# Patient Record
Sex: FEMALE | Race: WHITE | NOT HISPANIC OR LATINO | Employment: UNEMPLOYED | URBAN - METROPOLITAN AREA
[De-identification: names, ages, dates, MRNs, and addresses within clinical notes are randomized per-mention and may not be internally consistent; named-entity substitution may affect disease eponyms.]

---

## 2017-02-06 ENCOUNTER — TRANSCRIBE ORDERS (OUTPATIENT)
Dept: ADMINISTRATIVE | Facility: HOSPITAL | Age: 42
End: 2017-02-06

## 2017-02-06 DIAGNOSIS — Z12.31 VISIT FOR SCREENING MAMMOGRAM: Primary | ICD-10-CM

## 2019-03-28 ENCOUNTER — TRANSCRIBE ORDERS (OUTPATIENT)
Dept: ADMINISTRATIVE | Facility: HOSPITAL | Age: 44
End: 2019-03-28

## 2019-03-28 DIAGNOSIS — Z12.31 VISIT FOR SCREENING MAMMOGRAM: Primary | ICD-10-CM

## 2019-04-02 ENCOUNTER — APPOINTMENT (OUTPATIENT)
Dept: MAMMOGRAPHY | Facility: HOSPITAL | Age: 44
End: 2019-04-02

## 2019-05-10 ENCOUNTER — APPOINTMENT (OUTPATIENT)
Dept: MAMMOGRAPHY | Facility: HOSPITAL | Age: 44
End: 2019-05-10

## 2019-05-15 ENCOUNTER — APPOINTMENT (OUTPATIENT)
Dept: MAMMOGRAPHY | Facility: HOSPITAL | Age: 44
End: 2019-05-15

## 2019-06-14 ENCOUNTER — APPOINTMENT (OUTPATIENT)
Dept: MAMMOGRAPHY | Facility: HOSPITAL | Age: 44
End: 2019-06-14

## 2019-10-28 ENCOUNTER — TRANSCRIBE ORDERS (OUTPATIENT)
Dept: ADMINISTRATIVE | Facility: HOSPITAL | Age: 44
End: 2019-10-28

## 2019-10-28 DIAGNOSIS — Z12.31 VISIT FOR SCREENING MAMMOGRAM: Primary | ICD-10-CM

## 2020-01-06 ENCOUNTER — APPOINTMENT (OUTPATIENT)
Dept: MAMMOGRAPHY | Facility: HOSPITAL | Age: 45
End: 2020-01-06

## 2020-08-31 ENCOUNTER — TRANSCRIBE ORDERS (OUTPATIENT)
Dept: ADMINISTRATIVE | Facility: HOSPITAL | Age: 45
End: 2020-08-31

## 2020-08-31 DIAGNOSIS — Z12.31 VISIT FOR SCREENING MAMMOGRAM: Primary | ICD-10-CM

## 2020-11-25 ENCOUNTER — APPOINTMENT (OUTPATIENT)
Dept: MAMMOGRAPHY | Facility: HOSPITAL | Age: 45
End: 2020-11-25

## 2021-02-21 ENCOUNTER — HOSPITAL ENCOUNTER (INPATIENT)
Facility: HOSPITAL | Age: 46
LOS: 18 days | Discharge: PSYCHIATRIC OR SUBSTANCE USE FACILITY | End: 2021-03-11
Attending: EMERGENCY MEDICINE | Admitting: INTERNAL MEDICINE

## 2021-02-21 ENCOUNTER — APPOINTMENT (OUTPATIENT)
Dept: CT IMAGING | Facility: HOSPITAL | Age: 46
End: 2021-02-21

## 2021-02-21 DIAGNOSIS — T14.91XA SUICIDE ATTEMPT (HCC): Primary | ICD-10-CM

## 2021-02-21 DIAGNOSIS — T54.92XA: ICD-10-CM

## 2021-02-21 DIAGNOSIS — F10.920 ALCOHOLIC INTOXICATION WITHOUT COMPLICATION (HCC): ICD-10-CM

## 2021-02-21 DIAGNOSIS — K20.90 ESOPHAGITIS: ICD-10-CM

## 2021-02-21 DIAGNOSIS — F10.931 ALCOHOL WITHDRAWAL SYNDROME, WITH DELIRIUM (HCC): ICD-10-CM

## 2021-02-21 PROBLEM — E87.20 LACTIC ACIDOSIS: Status: ACTIVE | Noted: 2021-02-21

## 2021-02-21 PROBLEM — Z53.1 REFUSAL OF BLOOD TRANSFUSIONS AS PATIENT IS JEHOVAH'S WITNESS: Status: ACTIVE | Noted: 2021-02-21

## 2021-02-21 PROBLEM — R78.0 ELEVATED ETOH LEVEL: Status: ACTIVE | Noted: 2021-02-21

## 2021-02-21 LAB
ALBUMIN SERPL-MCNC: 4.2 G/DL (ref 3.5–5.2)
ALBUMIN/GLOB SERPL: 1.4 G/DL
ALP SERPL-CCNC: 142 U/L (ref 39–117)
ALT SERPL W P-5'-P-CCNC: 42 U/L (ref 1–33)
AMPHET+METHAMPHET UR QL: NEGATIVE
AMPHETAMINES UR QL: NEGATIVE
ANION GAP SERPL CALCULATED.3IONS-SCNC: 15 MMOL/L (ref 5–15)
APAP SERPL-MCNC: <5 MCG/ML (ref 0–30)
AST SERPL-CCNC: 53 U/L (ref 1–32)
BARBITURATES UR QL SCN: NEGATIVE
BASOPHILS # BLD AUTO: 0.05 10*3/MM3 (ref 0–0.2)
BASOPHILS NFR BLD AUTO: 0.3 % (ref 0–1.5)
BENZODIAZ UR QL SCN: NEGATIVE
BILIRUB SERPL-MCNC: 0.3 MG/DL (ref 0–1.2)
BILIRUB UR QL STRIP: NEGATIVE
BUN SERPL-MCNC: 6 MG/DL (ref 6–20)
BUN/CREAT SERPL: 8 (ref 7–25)
BUPRENORPHINE SERPL-MCNC: NEGATIVE NG/ML
CALCIUM SPEC-SCNC: 9.3 MG/DL (ref 8.6–10.5)
CANNABINOIDS SERPL QL: NEGATIVE
CHLORIDE SERPL-SCNC: 96 MMOL/L (ref 98–107)
CLARITY UR: CLEAR
CO2 SERPL-SCNC: 25 MMOL/L (ref 22–29)
COCAINE UR QL: NEGATIVE
COLOR UR: YELLOW
CREAT SERPL-MCNC: 0.75 MG/DL (ref 0.57–1)
D-LACTATE SERPL-SCNC: 6.7 MMOL/L (ref 0.5–2)
DEPRECATED RDW RBC AUTO: 60.9 FL (ref 37–54)
EOSINOPHIL # BLD AUTO: 0.03 10*3/MM3 (ref 0–0.4)
EOSINOPHIL NFR BLD AUTO: 0.2 % (ref 0.3–6.2)
ERYTHROCYTE [DISTWIDTH] IN BLOOD BY AUTOMATED COUNT: 22.4 % (ref 12.3–15.4)
ETHANOL BLD-MCNC: 161 MG/DL (ref 0–10)
FLUAV RNA RESP QL NAA+PROBE: NOT DETECTED
FLUBV RNA RESP QL NAA+PROBE: NOT DETECTED
GFR SERPL CREATININE-BSD FRML MDRD: 83 ML/MIN/1.73
GLOBULIN UR ELPH-MCNC: 2.9 GM/DL
GLUCOSE SERPL-MCNC: 150 MG/DL (ref 65–99)
GLUCOSE UR STRIP-MCNC: NEGATIVE MG/DL
HCT VFR BLD AUTO: 36.5 % (ref 34–46.6)
HGB BLD-MCNC: 11.1 G/DL (ref 12–15.9)
HGB UR QL STRIP.AUTO: NEGATIVE
HOLD SPECIMEN: NORMAL
IMM GRANULOCYTES # BLD AUTO: 0.06 10*3/MM3 (ref 0–0.05)
IMM GRANULOCYTES NFR BLD AUTO: 0.4 % (ref 0–0.5)
KETONES UR QL STRIP: NEGATIVE
LACTATE HOLD SPECIMEN: NORMAL
LEUKOCYTE ESTERASE UR QL STRIP.AUTO: NEGATIVE
LIPASE SERPL-CCNC: 13 U/L (ref 13–60)
LYMPHOCYTES # BLD AUTO: 2.55 10*3/MM3 (ref 0.7–3.1)
LYMPHOCYTES NFR BLD AUTO: 17.4 % (ref 19.6–45.3)
MAGNESIUM SERPL-MCNC: 2.2 MG/DL (ref 1.6–2.6)
MCH RBC QN AUTO: 24.4 PG (ref 26.6–33)
MCHC RBC AUTO-ENTMCNC: 30.4 G/DL (ref 31.5–35.7)
MCV RBC AUTO: 80.2 FL (ref 79–97)
METHADONE UR QL SCN: NEGATIVE
MONOCYTES # BLD AUTO: 0.66 10*3/MM3 (ref 0.1–0.9)
MONOCYTES NFR BLD AUTO: 4.5 % (ref 5–12)
NEUTROPHILS NFR BLD AUTO: 11.28 10*3/MM3 (ref 1.7–7)
NEUTROPHILS NFR BLD AUTO: 77.2 % (ref 42.7–76)
NITRITE UR QL STRIP: NEGATIVE
NRBC BLD AUTO-RTO: 0 /100 WBC (ref 0–0.2)
OPIATES UR QL: NEGATIVE
OXYCODONE UR QL SCN: NEGATIVE
PCP UR QL SCN: NEGATIVE
PH UR STRIP.AUTO: 6 [PH] (ref 5–8)
PHOSPHATE SERPL-MCNC: 3 MG/DL (ref 2.5–4.5)
PLATELET # BLD AUTO: 352 10*3/MM3 (ref 140–450)
PMV BLD AUTO: 9.3 FL (ref 6–12)
POTASSIUM SERPL-SCNC: 3.6 MMOL/L (ref 3.5–5.2)
PROPOXYPH UR QL: NEGATIVE
PROT SERPL-MCNC: 7.1 G/DL (ref 6–8.5)
PROT UR QL STRIP: NEGATIVE
RBC # BLD AUTO: 4.55 10*6/MM3 (ref 3.77–5.28)
SALICYLATES SERPL-MCNC: <0.3 MG/DL
SARS-COV-2 RNA RESP QL NAA+PROBE: NOT DETECTED
SODIUM SERPL-SCNC: 136 MMOL/L (ref 136–145)
SP GR UR STRIP: 1.01 (ref 1–1.03)
TRICYCLICS UR QL SCN: POSITIVE
UROBILINOGEN UR QL STRIP: NORMAL
WBC # BLD AUTO: 14.63 10*3/MM3 (ref 3.4–10.8)
WHOLE BLOOD HOLD SPECIMEN: NORMAL
WHOLE BLOOD HOLD SPECIMEN: NORMAL

## 2021-02-21 PROCEDURE — 80179 DRUG ASSAY SALICYLATE: CPT | Performed by: EMERGENCY MEDICINE

## 2021-02-21 PROCEDURE — 25010000002 MORPHINE PER 10 MG: Performed by: EMERGENCY MEDICINE

## 2021-02-21 PROCEDURE — 80053 COMPREHEN METABOLIC PANEL: CPT | Performed by: EMERGENCY MEDICINE

## 2021-02-21 PROCEDURE — 85025 COMPLETE CBC W/AUTO DIFF WBC: CPT | Performed by: EMERGENCY MEDICINE

## 2021-02-21 PROCEDURE — 83735 ASSAY OF MAGNESIUM: CPT | Performed by: EMERGENCY MEDICINE

## 2021-02-21 PROCEDURE — 80143 DRUG ASSAY ACETAMINOPHEN: CPT | Performed by: EMERGENCY MEDICINE

## 2021-02-21 PROCEDURE — 80306 DRUG TEST PRSMV INSTRMNT: CPT | Performed by: EMERGENCY MEDICINE

## 2021-02-21 PROCEDURE — 84100 ASSAY OF PHOSPHORUS: CPT | Performed by: EMERGENCY MEDICINE

## 2021-02-21 PROCEDURE — 83605 ASSAY OF LACTIC ACID: CPT | Performed by: EMERGENCY MEDICINE

## 2021-02-21 PROCEDURE — 83690 ASSAY OF LIPASE: CPT | Performed by: EMERGENCY MEDICINE

## 2021-02-21 PROCEDURE — 81003 URINALYSIS AUTO W/O SCOPE: CPT | Performed by: EMERGENCY MEDICINE

## 2021-02-21 PROCEDURE — 25010000002 ONDANSETRON PER 1 MG: Performed by: EMERGENCY MEDICINE

## 2021-02-21 PROCEDURE — 87636 SARSCOV2 & INF A&B AMP PRB: CPT | Performed by: NURSE PRACTITIONER

## 2021-02-21 PROCEDURE — 71250 CT THORAX DX C-: CPT

## 2021-02-21 PROCEDURE — 25010000002 PROCHLORPERAZINE 10 MG/2ML SOLUTION: Performed by: NURSE PRACTITIONER

## 2021-02-21 PROCEDURE — 25010000002 HYDROMORPHONE PER 4 MG: Performed by: NURSE PRACTITIONER

## 2021-02-21 PROCEDURE — 99291 CRITICAL CARE FIRST HOUR: CPT | Performed by: INTERNAL MEDICINE

## 2021-02-21 PROCEDURE — 82077 ASSAY SPEC XCP UR&BREATH IA: CPT | Performed by: EMERGENCY MEDICINE

## 2021-02-21 RX ORDER — SODIUM CHLORIDE, SODIUM LACTATE, POTASSIUM CHLORIDE, CALCIUM CHLORIDE 600; 310; 30; 20 MG/100ML; MG/100ML; MG/100ML; MG/100ML
125 INJECTION, SOLUTION INTRAVENOUS CONTINUOUS
Status: DISCONTINUED | OUTPATIENT
Start: 2021-02-21 | End: 2021-02-23

## 2021-02-21 RX ORDER — PAROXETINE HYDROCHLORIDE 20 MG/1
60 TABLET, FILM COATED ORAL EVERY MORNING
Status: ON HOLD | COMMUNITY
End: 2022-05-14

## 2021-02-21 RX ORDER — ONDANSETRON 2 MG/ML
4 INJECTION INTRAMUSCULAR; INTRAVENOUS ONCE
Status: COMPLETED | OUTPATIENT
Start: 2021-02-21 | End: 2021-02-21

## 2021-02-21 RX ORDER — QUETIAPINE FUMARATE 25 MG/1
200 TABLET, FILM COATED ORAL NIGHTLY
Status: ON HOLD | COMMUNITY
End: 2021-02-22

## 2021-02-21 RX ORDER — PROCHLORPERAZINE EDISYLATE 5 MG/ML
5 INJECTION INTRAMUSCULAR; INTRAVENOUS EVERY 6 HOURS PRN
Status: DISCONTINUED | OUTPATIENT
Start: 2021-02-21 | End: 2021-02-22

## 2021-02-21 RX ORDER — PANTOPRAZOLE SODIUM 40 MG/10ML
40 INJECTION, POWDER, LYOPHILIZED, FOR SOLUTION INTRAVENOUS
Status: DISCONTINUED | OUTPATIENT
Start: 2021-02-22 | End: 2021-02-22

## 2021-02-21 RX ORDER — SODIUM CHLORIDE 9 MG/ML
10 INJECTION INTRAVENOUS AS NEEDED
Status: DISCONTINUED | OUTPATIENT
Start: 2021-02-21 | End: 2021-02-22

## 2021-02-21 RX ORDER — HYDROMORPHONE HYDROCHLORIDE 1 MG/ML
0.5 INJECTION, SOLUTION INTRAMUSCULAR; INTRAVENOUS; SUBCUTANEOUS
Status: DISCONTINUED | OUTPATIENT
Start: 2021-02-21 | End: 2021-02-21

## 2021-02-21 RX ORDER — GABAPENTIN 600 MG/1
600 TABLET ORAL 3 TIMES DAILY
Status: ON HOLD | COMMUNITY
End: 2021-02-23

## 2021-02-21 RX ORDER — SODIUM CHLORIDE 0.9 % (FLUSH) 0.9 %
10 SYRINGE (ML) INJECTION AS NEEDED
Status: DISCONTINUED | OUTPATIENT
Start: 2021-02-21 | End: 2021-03-11 | Stop reason: HOSPADM

## 2021-02-21 RX ORDER — LORAZEPAM 2 MG/ML
2 INJECTION INTRAMUSCULAR EVERY 4 HOURS PRN
Status: DISCONTINUED | OUTPATIENT
Start: 2021-02-21 | End: 2021-02-21

## 2021-02-21 RX ORDER — ONDANSETRON 2 MG/ML
4 INJECTION INTRAMUSCULAR; INTRAVENOUS EVERY 6 HOURS PRN
Status: DISCONTINUED | OUTPATIENT
Start: 2021-02-21 | End: 2021-02-22 | Stop reason: SDUPTHER

## 2021-02-21 RX ORDER — SODIUM CHLORIDE 0.9 % (FLUSH) 0.9 %
10 SYRINGE (ML) INJECTION EVERY 12 HOURS SCHEDULED
Status: DISCONTINUED | OUTPATIENT
Start: 2021-02-21 | End: 2021-03-11 | Stop reason: HOSPADM

## 2021-02-21 RX ORDER — CLONAZEPAM 0.5 MG/1
1 TABLET ORAL 4 TIMES DAILY PRN
Status: ON HOLD | COMMUNITY
End: 2021-02-22

## 2021-02-21 RX ORDER — NALOXONE HCL 0.4 MG/ML
0.1 VIAL (ML) INJECTION
Status: DISCONTINUED | OUTPATIENT
Start: 2021-02-21 | End: 2021-03-05

## 2021-02-21 RX ORDER — MORPHINE SULFATE 4 MG/ML
4 INJECTION, SOLUTION INTRAMUSCULAR; INTRAVENOUS ONCE
Status: COMPLETED | OUTPATIENT
Start: 2021-02-21 | End: 2021-02-21

## 2021-02-21 RX ADMIN — HYDROMORPHONE HYDROCHLORIDE 0.5 MG: 1 INJECTION, SOLUTION INTRAMUSCULAR; INTRAVENOUS; SUBCUTANEOUS at 22:57

## 2021-02-21 RX ADMIN — PROCHLORPERAZINE EDISYLATE 5 MG: 5 INJECTION INTRAMUSCULAR; INTRAVENOUS at 22:57

## 2021-02-21 RX ADMIN — SODIUM CHLORIDE, POTASSIUM CHLORIDE, SODIUM LACTATE AND CALCIUM CHLORIDE 100 ML/HR: 600; 310; 30; 20 INJECTION, SOLUTION INTRAVENOUS at 21:05

## 2021-02-21 RX ADMIN — Medication: at 23:33

## 2021-02-21 RX ADMIN — ONDANSETRON 4 MG: 2 INJECTION INTRAMUSCULAR; INTRAVENOUS at 21:03

## 2021-02-21 RX ADMIN — MORPHINE SULFATE 4 MG: 4 INJECTION, SOLUTION INTRAMUSCULAR; INTRAVENOUS at 21:03

## 2021-02-21 RX ADMIN — SODIUM CHLORIDE, POTASSIUM CHLORIDE, SODIUM LACTATE AND CALCIUM CHLORIDE 1000 ML: 600; 310; 30; 20 INJECTION, SOLUTION INTRAVENOUS at 21:04

## 2021-02-22 ENCOUNTER — ANESTHESIA (OUTPATIENT)
Dept: GASTROENTEROLOGY | Facility: HOSPITAL | Age: 46
End: 2021-02-22

## 2021-02-22 ENCOUNTER — ANESTHESIA EVENT (OUTPATIENT)
Dept: GASTROENTEROLOGY | Facility: HOSPITAL | Age: 46
End: 2021-02-22

## 2021-02-22 PROBLEM — T54.91XA: Status: ACTIVE | Noted: 2021-02-21

## 2021-02-22 PROBLEM — K20.90 ESOPHAGITIS: Status: ACTIVE | Noted: 2021-02-21

## 2021-02-22 LAB
ANION GAP SERPL CALCULATED.3IONS-SCNC: 10 MMOL/L (ref 5–15)
BUN SERPL-MCNC: 5 MG/DL (ref 6–20)
BUN/CREAT SERPL: 7.4 (ref 7–25)
CALCIUM SPEC-SCNC: 9 MG/DL (ref 8.6–10.5)
CHLORIDE SERPL-SCNC: 106 MMOL/L (ref 98–107)
CO2 SERPL-SCNC: 29 MMOL/L (ref 22–29)
CREAT SERPL-MCNC: 0.68 MG/DL (ref 0.57–1)
D-LACTATE SERPL-SCNC: 5.4 MMOL/L (ref 0.5–2)
DEPRECATED RDW RBC AUTO: 64.9 FL (ref 37–54)
ERYTHROCYTE [DISTWIDTH] IN BLOOD BY AUTOMATED COUNT: 22.4 % (ref 12.3–15.4)
GFR SERPL CREATININE-BSD FRML MDRD: 93 ML/MIN/1.73
GLUCOSE BLDC GLUCOMTR-MCNC: 131 MG/DL (ref 70–130)
GLUCOSE BLDC GLUCOMTR-MCNC: 132 MG/DL (ref 70–130)
GLUCOSE BLDC GLUCOMTR-MCNC: 144 MG/DL (ref 70–130)
GLUCOSE BLDC GLUCOMTR-MCNC: 146 MG/DL (ref 70–130)
GLUCOSE BLDC GLUCOMTR-MCNC: 149 MG/DL (ref 70–130)
GLUCOSE SERPL-MCNC: 122 MG/DL (ref 65–99)
HBA1C MFR BLD: 6.1 % (ref 4.8–5.6)
HCT VFR BLD AUTO: 36.3 % (ref 34–46.6)
HGB BLD-MCNC: 10.5 G/DL (ref 12–15.9)
MAGNESIUM SERPL-MCNC: 2.1 MG/DL (ref 1.6–2.6)
MCH RBC QN AUTO: 24.1 PG (ref 26.6–33)
MCHC RBC AUTO-ENTMCNC: 28.9 G/DL (ref 31.5–35.7)
MCV RBC AUTO: 83.3 FL (ref 79–97)
PHOSPHATE SERPL-MCNC: 3.4 MG/DL (ref 2.5–4.5)
PLATELET # BLD AUTO: 300 10*3/MM3 (ref 140–450)
PMV BLD AUTO: 9.4 FL (ref 6–12)
POTASSIUM SERPL-SCNC: 4.1 MMOL/L (ref 3.5–5.2)
RBC # BLD AUTO: 4.36 10*6/MM3 (ref 3.77–5.28)
SODIUM SERPL-SCNC: 145 MMOL/L (ref 136–145)
TSH SERPL DL<=0.05 MIU/L-ACNC: 5.58 UIU/ML (ref 0.27–4.2)
WBC # BLD AUTO: 11.19 10*3/MM3 (ref 3.4–10.8)

## 2021-02-22 PROCEDURE — 0DJ08ZZ INSPECTION OF UPPER INTESTINAL TRACT, VIA NATURAL OR ARTIFICIAL OPENING ENDOSCOPIC: ICD-10-PCS | Performed by: INTERNAL MEDICINE

## 2021-02-22 PROCEDURE — 84443 ASSAY THYROID STIM HORMONE: CPT | Performed by: NURSE PRACTITIONER

## 2021-02-22 PROCEDURE — 83036 HEMOGLOBIN GLYCOSYLATED A1C: CPT | Performed by: NURSE PRACTITIONER

## 2021-02-22 PROCEDURE — 80048 BASIC METABOLIC PNL TOTAL CA: CPT | Performed by: NURSE PRACTITIONER

## 2021-02-22 PROCEDURE — 85027 COMPLETE CBC AUTOMATED: CPT | Performed by: NURSE PRACTITIONER

## 2021-02-22 PROCEDURE — 84100 ASSAY OF PHOSPHORUS: CPT | Performed by: NURSE PRACTITIONER

## 2021-02-22 PROCEDURE — 83735 ASSAY OF MAGNESIUM: CPT | Performed by: NURSE PRACTITIONER

## 2021-02-22 PROCEDURE — 25010000002 DIAZEPAM PER 5 MG: Performed by: INTERNAL MEDICINE

## 2021-02-22 PROCEDURE — 25010000002 PROPOFOL 10 MG/ML EMULSION: Performed by: ANESTHESIOLOGY

## 2021-02-22 PROCEDURE — 25010000002 PROCHLORPERAZINE 10 MG/2ML SOLUTION: Performed by: NURSE PRACTITIONER

## 2021-02-22 PROCEDURE — 25010000002 LORAZEPAM PER 2 MG: Performed by: NURSE PRACTITIONER

## 2021-02-22 PROCEDURE — 25010000002 THIAMINE PER 100 MG: Performed by: INTERNAL MEDICINE

## 2021-02-22 PROCEDURE — 99222 1ST HOSP IP/OBS MODERATE 55: CPT | Performed by: PHYSICIAN ASSISTANT

## 2021-02-22 PROCEDURE — 25010000002 HYDROMORPHONE HCL-NACL 30-0.9 MG/30ML-% SOLUTION PREFILLED SYRINGE: Performed by: NURSE PRACTITIONER

## 2021-02-22 PROCEDURE — 82962 GLUCOSE BLOOD TEST: CPT

## 2021-02-22 PROCEDURE — 25010000002 LORAZEPAM PER 2 MG: Performed by: INTERNAL MEDICINE

## 2021-02-22 PROCEDURE — 43235 EGD DIAGNOSTIC BRUSH WASH: CPT | Performed by: INTERNAL MEDICINE

## 2021-02-22 PROCEDURE — 25010000002 ENOXAPARIN PER 10 MG: Performed by: NURSE PRACTITIONER

## 2021-02-22 PROCEDURE — 99291 CRITICAL CARE FIRST HOUR: CPT | Performed by: INTERNAL MEDICINE

## 2021-02-22 RX ORDER — QUETIAPINE FUMARATE 200 MG/1
200 TABLET, FILM COATED ORAL NIGHTLY
COMMUNITY
End: 2021-03-11 | Stop reason: HOSPADM

## 2021-02-22 RX ORDER — ENALAPRILAT 2.5 MG/2ML
1.25 INJECTION INTRAVENOUS EVERY 6 HOURS PRN
Status: DISCONTINUED | OUTPATIENT
Start: 2021-02-22 | End: 2021-02-22

## 2021-02-22 RX ORDER — LITHIUM CARBONATE 300 MG
300 TABLET ORAL 3 TIMES DAILY
Status: ON HOLD | COMMUNITY
End: 2021-02-22

## 2021-02-22 RX ORDER — LIDOCAINE HYDROCHLORIDE 10 MG/ML
INJECTION, SOLUTION EPIDURAL; INFILTRATION; INTRACAUDAL; PERINEURAL AS NEEDED
Status: DISCONTINUED | OUTPATIENT
Start: 2021-02-22 | End: 2021-02-22 | Stop reason: SURG

## 2021-02-22 RX ORDER — ROPINIROLE 0.25 MG/1
0.5 TABLET, FILM COATED ORAL AS NEEDED
Status: ON HOLD | COMMUNITY
End: 2021-02-22

## 2021-02-22 RX ORDER — PANTOPRAZOLE SODIUM 40 MG/10ML
40 INJECTION, POWDER, LYOPHILIZED, FOR SOLUTION INTRAVENOUS
Status: DISCONTINUED | OUTPATIENT
Start: 2021-02-22 | End: 2021-02-25

## 2021-02-22 RX ORDER — DIAZEPAM 5 MG/ML
10 INJECTION, SOLUTION INTRAMUSCULAR; INTRAVENOUS EVERY 8 HOURS SCHEDULED
Status: DISCONTINUED | OUTPATIENT
Start: 2021-02-22 | End: 2021-02-23

## 2021-02-22 RX ORDER — CLOMIPRAMINE HYDROCHLORIDE 75 MG/1
75 CAPSULE ORAL NIGHTLY
COMMUNITY
End: 2021-03-11 | Stop reason: HOSPADM

## 2021-02-22 RX ORDER — IPRATROPIUM BROMIDE AND ALBUTEROL SULFATE 2.5; .5 MG/3ML; MG/3ML
3 SOLUTION RESPIRATORY (INHALATION) ONCE AS NEEDED
Status: DISCONTINUED | OUTPATIENT
Start: 2021-02-22 | End: 2021-02-22 | Stop reason: HOSPADM

## 2021-02-22 RX ORDER — GABAPENTIN 300 MG/1
300 CAPSULE ORAL EVERY MORNING
COMMUNITY
End: 2021-03-11 | Stop reason: HOSPADM

## 2021-02-22 RX ORDER — LORAZEPAM 2 MG/ML
1 INJECTION INTRAMUSCULAR EVERY 4 HOURS PRN
Status: DISCONTINUED | OUTPATIENT
Start: 2021-02-22 | End: 2021-02-22

## 2021-02-22 RX ORDER — LABETALOL HYDROCHLORIDE 5 MG/ML
20 INJECTION, SOLUTION INTRAVENOUS EVERY 4 HOURS PRN
Status: DISCONTINUED | OUTPATIENT
Start: 2021-02-22 | End: 2021-03-04

## 2021-02-22 RX ORDER — DEXMEDETOMIDINE HYDROCHLORIDE 4 UG/ML
.2-1.5 INJECTION, SOLUTION INTRAVENOUS
Status: DISCONTINUED | OUTPATIENT
Start: 2021-02-22 | End: 2021-02-27

## 2021-02-22 RX ORDER — ONDANSETRON 2 MG/ML
4 INJECTION INTRAMUSCULAR; INTRAVENOUS EVERY 6 HOURS PRN
Status: DISCONTINUED | OUTPATIENT
Start: 2021-02-22 | End: 2021-03-11 | Stop reason: HOSPADM

## 2021-02-22 RX ORDER — CLONAZEPAM 1 MG/1
1 TABLET ORAL 4 TIMES DAILY
COMMUNITY
End: 2021-03-11 | Stop reason: HOSPADM

## 2021-02-22 RX ORDER — LORAZEPAM 2 MG/ML
1 INJECTION INTRAMUSCULAR
Status: DISCONTINUED | OUTPATIENT
Start: 2021-02-22 | End: 2021-02-23

## 2021-02-22 RX ORDER — LITHIUM CARBONATE 600 MG/1
600 CAPSULE ORAL 2 TIMES DAILY
COMMUNITY
End: 2021-03-11 | Stop reason: HOSPADM

## 2021-02-22 RX ORDER — LEVOTHYROXINE SODIUM 88 UG/1
88 TABLET ORAL DAILY
COMMUNITY

## 2021-02-22 RX ORDER — ONDANSETRON 2 MG/ML
4 INJECTION INTRAMUSCULAR; INTRAVENOUS ONCE AS NEEDED
Status: DISCONTINUED | OUTPATIENT
Start: 2021-02-22 | End: 2021-02-22 | Stop reason: HOSPADM

## 2021-02-22 RX ORDER — LORAZEPAM 2 MG/ML
2 INJECTION INTRAMUSCULAR
Status: DISCONTINUED | OUTPATIENT
Start: 2021-02-22 | End: 2021-02-23

## 2021-02-22 RX ORDER — OLANZAPINE 7.5 MG/1
10 TABLET ORAL NIGHTLY
Status: ON HOLD | COMMUNITY
End: 2021-02-23

## 2021-02-22 RX ADMIN — LORAZEPAM 2 MG: 2 INJECTION INTRAMUSCULAR; INTRAVENOUS at 18:38

## 2021-02-22 RX ADMIN — PROPOFOL 150 MCG/KG/MIN: 10 INJECTION, EMULSION INTRAVENOUS at 12:19

## 2021-02-22 RX ADMIN — LORAZEPAM 2 MG: 2 INJECTION INTRAMUSCULAR; INTRAVENOUS at 17:00

## 2021-02-22 RX ADMIN — DEXMEDETOMIDINE HYDROCHLORIDE 1.5 MCG/KG/HR: 400 INJECTION, SOLUTION INTRAVENOUS at 22:08

## 2021-02-22 RX ADMIN — LIDOCAINE HYDROCHLORIDE 50 MG: 10 INJECTION, SOLUTION EPIDURAL; INFILTRATION; INTRACAUDAL; PERINEURAL at 12:19

## 2021-02-22 RX ADMIN — DEXMEDETOMIDINE HYDROCHLORIDE 1.5 MCG/KG/HR: 400 INJECTION, SOLUTION INTRAVENOUS at 18:41

## 2021-02-22 RX ADMIN — SODIUM CHLORIDE, POTASSIUM CHLORIDE, SODIUM LACTATE AND CALCIUM CHLORIDE 125 ML/HR: 600; 310; 30; 20 INJECTION, SOLUTION INTRAVENOUS at 06:04

## 2021-02-22 RX ADMIN — PANTOPRAZOLE SODIUM 40 MG: 40 INJECTION, POWDER, FOR SOLUTION INTRAVENOUS at 06:04

## 2021-02-22 RX ADMIN — SODIUM CHLORIDE, PRESERVATIVE FREE 10 ML: 5 INJECTION INTRAVENOUS at 08:02

## 2021-02-22 RX ADMIN — Medication 20 MG: at 01:38

## 2021-02-22 RX ADMIN — LORAZEPAM 1 MG: 2 INJECTION INTRAMUSCULAR; INTRAVENOUS at 13:27

## 2021-02-22 RX ADMIN — ENOXAPARIN SODIUM 40 MG: 40 INJECTION SUBCUTANEOUS at 08:02

## 2021-02-22 RX ADMIN — DEXMEDETOMIDINE HYDROCHLORIDE 0.9 MCG/KG/HR: 400 INJECTION, SOLUTION INTRAVENOUS at 16:11

## 2021-02-22 RX ADMIN — THIAMINE HYDROCHLORIDE 100 MG: 100 INJECTION, SOLUTION INTRAMUSCULAR; INTRAVENOUS at 00:44

## 2021-02-22 RX ADMIN — LORAZEPAM 2 MG: 2 INJECTION INTRAMUSCULAR; INTRAVENOUS at 20:28

## 2021-02-22 RX ADMIN — PANTOPRAZOLE SODIUM 40 MG: 40 INJECTION, POWDER, FOR SOLUTION INTRAVENOUS at 16:56

## 2021-02-22 RX ADMIN — SODIUM CHLORIDE, PRESERVATIVE FREE 10 ML: 5 INJECTION INTRAVENOUS at 20:29

## 2021-02-22 RX ADMIN — SODIUM CHLORIDE, POTASSIUM CHLORIDE, SODIUM LACTATE AND CALCIUM CHLORIDE 125 ML/HR: 600; 310; 30; 20 INJECTION, SOLUTION INTRAVENOUS at 13:47

## 2021-02-22 RX ADMIN — DEXMEDETOMIDINE HYDROCHLORIDE 0.2 MCG/KG/HR: 400 INJECTION, SOLUTION INTRAVENOUS at 09:56

## 2021-02-22 RX ADMIN — THIAMINE HYDROCHLORIDE 100 MG: 100 INJECTION, SOLUTION INTRAMUSCULAR; INTRAVENOUS at 08:02

## 2021-02-22 RX ADMIN — THIAMINE HYDROCHLORIDE 100 MG: 100 INJECTION, SOLUTION INTRAMUSCULAR; INTRAVENOUS at 21:12

## 2021-02-22 RX ADMIN — Medication 20 MG: at 08:40

## 2021-02-22 RX ADMIN — LORAZEPAM 1 MG: 2 INJECTION INTRAMUSCULAR; INTRAVENOUS at 06:13

## 2021-02-22 RX ADMIN — Medication 20 MG: at 22:40

## 2021-02-22 RX ADMIN — ENALAPRILAT 1.25 MG: 1.25 INJECTION INTRAVENOUS at 04:18

## 2021-02-22 RX ADMIN — DIAZEPAM 10 MG: 5 INJECTION, SOLUTION INTRAMUSCULAR; INTRAVENOUS at 21:43

## 2021-02-22 RX ADMIN — DIAZEPAM 10 MG: 5 INJECTION, SOLUTION INTRAMUSCULAR; INTRAVENOUS at 09:51

## 2021-02-22 RX ADMIN — LORAZEPAM 1 MG: 2 INJECTION INTRAMUSCULAR; INTRAVENOUS at 02:13

## 2021-02-22 RX ADMIN — PROCHLORPERAZINE EDISYLATE 5 MG: 5 INJECTION INTRAMUSCULAR; INTRAVENOUS at 08:31

## 2021-02-22 NOTE — ANESTHESIA PREPROCEDURE EVALUATION
Anesthesia Evaluation     Patient summary reviewed and Nursing notes reviewed   no history of anesthetic complications:  NPO Solid Status: > 8 hours  NPO Liquid Status: > 2 hours           Airway   Mallampati: II  TM distance: >3 FB  Neck ROM: full  No difficulty expected  Dental - normal exam     Pulmonary - normal exam    breath sounds clear to auscultation  Cardiovascular - normal exam        Neuro/Psych  (+) psychiatric history Depression,     GI/Hepatic/Renal/Endo      ROS Comment: Caustic esophagitis    Musculoskeletal     Abdominal    Substance History      OB/GYN          Other                        Anesthesia Plan    ASA 2     general     intravenous induction     Anesthetic plan, all risks, benefits, and alternatives have been provided, discussed and informed consent has been obtained with: patient.  Use of blood products discussed with patient  Did not consent to blood products. Special considerations: Zoroastrian.   Plan discussed with CRNA.

## 2021-02-22 NOTE — ANESTHESIA POSTPROCEDURE EVALUATION
Patient: Elvia Montero    Procedure Summary     Date: 02/22/21 Room / Location:  [facility] /  [facility]    Anesthesia Start: 1215 Anesthesia Stop: 1236    Procedure: ESOPHAGOGASTRODUODENOSCOPY (N/A ) Diagnosis:       Toxic effect of caustic substance, intentional self-harm, initial encounter (CMS/[facility])      Esophagitis      (Toxic effect of caustic substance, intentional self-harm, initial encounter (CMS/[facility]) [T54.92XA])      (Esophagitis [K20.90])    Surgeon: Chaparro Chester MD Provider: Marcelo Escoto MD    Anesthesia Type: general ASA Status: 2          Anesthesia Type: general    Vitals  No vitals data found for the desired time range.          Post Anesthesia Care and Evaluation    Patient location during evaluation: bedside  Patient participation: complete - patient participated  Level of consciousness: awake and alert  Pain management: adequate  Airway patency: patent  Anesthetic complications: No anesthetic complications  PONV Status: none  Cardiovascular status: hemodynamically stable and acceptable  Respiratory status: nonlabored ventilation, acceptable and nasal cannula  Hydration status: acceptable    Comments: ICU RN to transport pt back to ICU. All questions answered and handoff given in room.

## 2021-02-23 ENCOUNTER — APPOINTMENT (OUTPATIENT)
Dept: GENERAL RADIOLOGY | Facility: HOSPITAL | Age: 46
End: 2021-02-23

## 2021-02-23 LAB
ALBUMIN SERPL-MCNC: 3.7 G/DL (ref 3.5–5.2)
ALBUMIN SERPL-MCNC: 3.8 G/DL (ref 3.5–5.2)
ALBUMIN/GLOB SERPL: 1.3 G/DL
ALP SERPL-CCNC: 141 U/L (ref 39–117)
ALP SERPL-CCNC: 141 U/L (ref 39–117)
ALT SERPL W P-5'-P-CCNC: 30 U/L (ref 1–33)
ALT SERPL W P-5'-P-CCNC: 30 U/L (ref 1–33)
ANION GAP SERPL CALCULATED.3IONS-SCNC: 7 MMOL/L (ref 5–15)
ANION GAP SERPL CALCULATED.3IONS-SCNC: 8 MMOL/L (ref 5–15)
AST SERPL-CCNC: 37 U/L (ref 1–32)
AST SERPL-CCNC: 38 U/L (ref 1–32)
BASOPHILS # BLD AUTO: 0.03 10*3/MM3 (ref 0–0.2)
BASOPHILS NFR BLD AUTO: 0.4 % (ref 0–1.5)
BILIRUB SERPL-MCNC: 0.3 MG/DL (ref 0–1.2)
BILIRUB SERPL-MCNC: 0.4 MG/DL (ref 0–1.2)
BILIRUB UR QL STRIP: NEGATIVE
BUN SERPL-MCNC: 6 MG/DL (ref 6–20)
BUN SERPL-MCNC: 6 MG/DL (ref 6–20)
BUN/CREAT SERPL: 7.9 (ref 7–25)
CALCIUM SPEC-SCNC: 9 MG/DL (ref 8.6–10.5)
CALCIUM SPEC-SCNC: 9.1 MG/DL (ref 8.6–10.5)
CHLORIDE SERPL-SCNC: 111 MMOL/L (ref 98–107)
CHLORIDE SERPL-SCNC: 113 MMOL/L (ref 98–107)
CHOLEST SERPL-MCNC: 115 MG/DL (ref 0–200)
CLARITY UR: CLEAR
CO2 SERPL-SCNC: 30 MMOL/L (ref 22–29)
CO2 SERPL-SCNC: 30 MMOL/L (ref 22–29)
COLOR UR: YELLOW
CREAT SERPL-MCNC: 0.76 MG/DL (ref 0.57–1)
CREAT SERPL-MCNC: 0.8 MG/DL (ref 0.57–1)
CRP SERPL-MCNC: 4.51 MG/DL (ref 0–0.5)
DEPRECATED RDW RBC AUTO: 67 FL (ref 37–54)
EOSINOPHIL # BLD AUTO: 0.1 10*3/MM3 (ref 0–0.4)
EOSINOPHIL NFR BLD AUTO: 1.4 % (ref 0.3–6.2)
ERYTHROCYTE [DISTWIDTH] IN BLOOD BY AUTOMATED COUNT: 22.3 % (ref 12.3–15.4)
GFR SERPL CREATININE-BSD FRML MDRD: 82 ML/MIN/1.73
GLOBULIN UR ELPH-MCNC: 2.9 GM/DL
GLUCOSE BLDC GLUCOMTR-MCNC: 152 MG/DL (ref 70–130)
GLUCOSE BLDC GLUCOMTR-MCNC: 155 MG/DL (ref 70–130)
GLUCOSE BLDC GLUCOMTR-MCNC: 178 MG/DL (ref 70–130)
GLUCOSE SERPL-MCNC: 169 MG/DL (ref 65–99)
GLUCOSE SERPL-MCNC: 169 MG/DL (ref 65–99)
GLUCOSE UR STRIP-MCNC: NEGATIVE MG/DL
HCT VFR BLD AUTO: 37.9 % (ref 34–46.6)
HGB BLD-MCNC: 10.7 G/DL (ref 12–15.9)
HGB UR QL STRIP.AUTO: NEGATIVE
HYPOCHROMIA BLD QL: NORMAL
IMM GRANULOCYTES # BLD AUTO: 0.03 10*3/MM3 (ref 0–0.05)
IMM GRANULOCYTES NFR BLD AUTO: 0.4 % (ref 0–0.5)
KETONES UR QL STRIP: NEGATIVE
LEUKOCYTE ESTERASE UR QL STRIP.AUTO: NEGATIVE
LITHIUM SERPL-SCNC: 0.1 MMOL/L (ref 0.6–1.2)
LYMPHOCYTES # BLD AUTO: 1.13 10*3/MM3 (ref 0.7–3.1)
LYMPHOCYTES NFR BLD AUTO: 15.3 % (ref 19.6–45.3)
MAGNESIUM SERPL-MCNC: 2.2 MG/DL (ref 1.6–2.6)
MAGNESIUM SERPL-MCNC: 2.2 MG/DL (ref 1.6–2.6)
MCH RBC QN AUTO: 23.9 PG (ref 26.6–33)
MCHC RBC AUTO-ENTMCNC: 28.2 G/DL (ref 31.5–35.7)
MCV RBC AUTO: 84.8 FL (ref 79–97)
MICROCYTES BLD QL: NORMAL
MONOCYTES # BLD AUTO: 0.28 10*3/MM3 (ref 0.1–0.9)
MONOCYTES NFR BLD AUTO: 3.8 % (ref 5–12)
NEUTROPHILS NFR BLD AUTO: 5.8 10*3/MM3 (ref 1.7–7)
NEUTROPHILS NFR BLD AUTO: 78.7 % (ref 42.7–76)
NITRITE UR QL STRIP: NEGATIVE
NRBC BLD AUTO-RTO: 0 /100 WBC (ref 0–0.2)
PH UR STRIP.AUTO: 8.5 [PH] (ref 5–8)
PHOSPHATE SERPL-MCNC: 2.7 MG/DL (ref 2.5–4.5)
PHOSPHATE SERPL-MCNC: 2.8 MG/DL (ref 2.5–4.5)
PLAT MORPH BLD: NORMAL
PLATELET # BLD AUTO: 254 10*3/MM3 (ref 140–450)
PMV BLD AUTO: 9 FL (ref 6–12)
POTASSIUM SERPL-SCNC: 4 MMOL/L (ref 3.5–5.2)
POTASSIUM SERPL-SCNC: 4 MMOL/L (ref 3.5–5.2)
PREALB SERPL-MCNC: 30.3 MG/DL (ref 20–40)
PROT SERPL-MCNC: 6.6 G/DL (ref 6–8.5)
PROT SERPL-MCNC: 6.6 G/DL (ref 6–8.5)
PROT UR QL STRIP: NEGATIVE
RBC # BLD AUTO: 4.47 10*6/MM3 (ref 3.77–5.28)
SODIUM SERPL-SCNC: 149 MMOL/L (ref 136–145)
SODIUM SERPL-SCNC: 150 MMOL/L (ref 136–145)
SP GR UR STRIP: 1.01 (ref 1–1.03)
TRIGL SERPL-MCNC: 347 MG/DL (ref 0–150)
UROBILINOGEN UR QL STRIP: ABNORMAL
WBC # BLD AUTO: 7.37 10*3/MM3 (ref 3.4–10.8)
WBC MORPH BLD: NORMAL

## 2021-02-23 PROCEDURE — 82465 ASSAY BLD/SERUM CHOLESTEROL: CPT | Performed by: INTERNAL MEDICINE

## 2021-02-23 PROCEDURE — 80053 COMPREHEN METABOLIC PANEL: CPT | Performed by: INTERNAL MEDICINE

## 2021-02-23 PROCEDURE — 74018 RADEX ABDOMEN 1 VIEW: CPT

## 2021-02-23 PROCEDURE — 25010000002 ENOXAPARIN PER 10 MG: Performed by: NURSE PRACTITIONER

## 2021-02-23 PROCEDURE — 99291 CRITICAL CARE FIRST HOUR: CPT | Performed by: INTERNAL MEDICINE

## 2021-02-23 PROCEDURE — 3E0G76Z INTRODUCTION OF NUTRITIONAL SUBSTANCE INTO UPPER GI, VIA NATURAL OR ARTIFICIAL OPENING: ICD-10-PCS | Performed by: INTERNAL MEDICINE

## 2021-02-23 PROCEDURE — 80178 ASSAY OF LITHIUM: CPT | Performed by: INTERNAL MEDICINE

## 2021-02-23 PROCEDURE — 25010000002 HALOPERIDOL LACTATE PER 5 MG: Performed by: NURSE PRACTITIONER

## 2021-02-23 PROCEDURE — 84100 ASSAY OF PHOSPHORUS: CPT | Performed by: INTERNAL MEDICINE

## 2021-02-23 PROCEDURE — 82962 GLUCOSE BLOOD TEST: CPT

## 2021-02-23 PROCEDURE — 25010000002 THIAMINE PER 100 MG: Performed by: INTERNAL MEDICINE

## 2021-02-23 PROCEDURE — 63710000001 INSULIN REGULAR HUMAN PER 5 UNITS: Performed by: INTERNAL MEDICINE

## 2021-02-23 PROCEDURE — 25010000002 LORAZEPAM PER 2 MG: Performed by: NURSE PRACTITIONER

## 2021-02-23 PROCEDURE — 81003 URINALYSIS AUTO W/O SCOPE: CPT | Performed by: INTERNAL MEDICINE

## 2021-02-23 PROCEDURE — 84478 ASSAY OF TRIGLYCERIDES: CPT | Performed by: INTERNAL MEDICINE

## 2021-02-23 PROCEDURE — 25010000002 LORAZEPAM PER 2 MG: Performed by: INTERNAL MEDICINE

## 2021-02-23 PROCEDURE — 86140 C-REACTIVE PROTEIN: CPT | Performed by: INTERNAL MEDICINE

## 2021-02-23 PROCEDURE — 83735 ASSAY OF MAGNESIUM: CPT | Performed by: INTERNAL MEDICINE

## 2021-02-23 PROCEDURE — 85007 BL SMEAR W/DIFF WBC COUNT: CPT | Performed by: INTERNAL MEDICINE

## 2021-02-23 PROCEDURE — 25010000002 DIAZEPAM PER 5 MG: Performed by: INTERNAL MEDICINE

## 2021-02-23 PROCEDURE — 84134 ASSAY OF PREALBUMIN: CPT | Performed by: INTERNAL MEDICINE

## 2021-02-23 PROCEDURE — 85025 COMPLETE CBC W/AUTO DIFF WBC: CPT | Performed by: INTERNAL MEDICINE

## 2021-02-23 RX ORDER — CHOLECALCIFEROL (VITAMIN D3) 125 MCG
10 CAPSULE ORAL NIGHTLY
Status: DISCONTINUED | OUTPATIENT
Start: 2021-02-23 | End: 2021-02-23

## 2021-02-23 RX ORDER — LEVOTHYROXINE SODIUM 20 UG/ML
50 INJECTION, SOLUTION INTRAVENOUS
Status: DISCONTINUED | OUTPATIENT
Start: 2021-02-23 | End: 2021-02-25

## 2021-02-23 RX ORDER — LORAZEPAM 1 MG/1
1 TABLET ORAL
Status: DISCONTINUED | OUTPATIENT
Start: 2021-02-23 | End: 2021-02-23

## 2021-02-23 RX ORDER — LORAZEPAM 2 MG/ML
4 INJECTION INTRAMUSCULAR
Status: DISCONTINUED | OUTPATIENT
Start: 2021-02-23 | End: 2021-02-23

## 2021-02-23 RX ORDER — FOLIC ACID 1 MG/1
1 TABLET ORAL DAILY
Status: DISCONTINUED | OUTPATIENT
Start: 2021-02-23 | End: 2021-02-24

## 2021-02-23 RX ORDER — ACETAMINOPHEN 160 MG/5ML
650 SOLUTION ORAL EVERY 6 HOURS PRN
Status: DISCONTINUED | OUTPATIENT
Start: 2021-02-23 | End: 2021-03-11 | Stop reason: HOSPADM

## 2021-02-23 RX ORDER — CHOLECALCIFEROL (VITAMIN D3) 125 MCG
10 CAPSULE ORAL NIGHTLY
Status: DISCONTINUED | OUTPATIENT
Start: 2021-02-23 | End: 2021-03-09

## 2021-02-23 RX ORDER — PAROXETINE HYDROCHLORIDE 20 MG/1
60 TABLET, FILM COATED ORAL EVERY MORNING
Status: DISCONTINUED | OUTPATIENT
Start: 2021-02-24 | End: 2021-02-24

## 2021-02-23 RX ORDER — QUETIAPINE FUMARATE 100 MG/1
100 TABLET, FILM COATED ORAL DAILY
Status: DISCONTINUED | OUTPATIENT
Start: 2021-02-23 | End: 2021-02-23

## 2021-02-23 RX ORDER — GABAPENTIN 300 MG/1
300 CAPSULE ORAL EVERY MORNING
Status: DISCONTINUED | OUTPATIENT
Start: 2021-02-23 | End: 2021-02-23

## 2021-02-23 RX ORDER — DIAZEPAM 5 MG/ML
10 INJECTION, SOLUTION INTRAMUSCULAR; INTRAVENOUS EVERY 6 HOURS
Status: DISCONTINUED | OUTPATIENT
Start: 2021-02-23 | End: 2021-02-24

## 2021-02-23 RX ORDER — QUETIAPINE FUMARATE 100 MG/1
200 TABLET, FILM COATED ORAL NIGHTLY
Status: DISCONTINUED | OUTPATIENT
Start: 2021-02-23 | End: 2021-03-04

## 2021-02-23 RX ORDER — GABAPENTIN 600 MG/1
600 TABLET ORAL NIGHTLY
COMMUNITY
End: 2021-03-11 | Stop reason: HOSPADM

## 2021-02-23 RX ORDER — LORAZEPAM 2 MG/ML
1 INJECTION INTRAMUSCULAR
Status: DISCONTINUED | OUTPATIENT
Start: 2021-02-23 | End: 2021-02-24

## 2021-02-23 RX ORDER — DEXTROSE MONOHYDRATE 50 MG/ML
100 INJECTION, SOLUTION INTRAVENOUS CONTINUOUS
Status: DISCONTINUED | OUTPATIENT
Start: 2021-02-23 | End: 2021-02-24

## 2021-02-23 RX ORDER — GABAPENTIN 300 MG/1
600 CAPSULE ORAL NIGHTLY
Status: DISCONTINUED | OUTPATIENT
Start: 2021-02-23 | End: 2021-02-23

## 2021-02-23 RX ORDER — GABAPENTIN 300 MG/1
600 CAPSULE ORAL NIGHTLY
Status: DISCONTINUED | OUTPATIENT
Start: 2021-02-23 | End: 2021-02-24

## 2021-02-23 RX ORDER — LORAZEPAM 2 MG/ML
1 INJECTION INTRAMUSCULAR
Status: DISCONTINUED | OUTPATIENT
Start: 2021-02-23 | End: 2021-02-23

## 2021-02-23 RX ORDER — ENALAPRILAT 2.5 MG/2ML
1.25 INJECTION INTRAVENOUS ONCE
Status: COMPLETED | OUTPATIENT
Start: 2021-02-23 | End: 2021-02-23

## 2021-02-23 RX ORDER — LORAZEPAM 1 MG/1
2 TABLET ORAL
Status: DISCONTINUED | OUTPATIENT
Start: 2021-02-23 | End: 2021-02-23

## 2021-02-23 RX ORDER — LORAZEPAM 2 MG/ML
2 INJECTION INTRAMUSCULAR
Status: DISCONTINUED | OUTPATIENT
Start: 2021-02-23 | End: 2021-02-24

## 2021-02-23 RX ORDER — LORAZEPAM 1 MG/1
4 TABLET ORAL
Status: DISCONTINUED | OUTPATIENT
Start: 2021-02-23 | End: 2021-02-23

## 2021-02-23 RX ORDER — LORAZEPAM 2 MG/ML
2 INJECTION INTRAMUSCULAR ONCE
Status: COMPLETED | OUTPATIENT
Start: 2021-02-23 | End: 2021-02-23

## 2021-02-23 RX ORDER — QUETIAPINE FUMARATE 100 MG/1
200 TABLET, FILM COATED ORAL NIGHTLY
Status: DISCONTINUED | OUTPATIENT
Start: 2021-02-23 | End: 2021-02-23

## 2021-02-23 RX ORDER — MULTIPLE VITAMINS W/ MINERALS TAB 9MG-400MCG
1 TAB ORAL DAILY
Status: DISCONTINUED | OUTPATIENT
Start: 2021-02-23 | End: 2021-02-25

## 2021-02-23 RX ORDER — LORAZEPAM 2 MG/ML
2 INJECTION INTRAMUSCULAR
Status: DISCONTINUED | OUTPATIENT
Start: 2021-02-23 | End: 2021-02-23

## 2021-02-23 RX ORDER — GABAPENTIN 300 MG/1
300 CAPSULE ORAL EVERY MORNING
Status: DISCONTINUED | OUTPATIENT
Start: 2021-02-23 | End: 2021-02-24

## 2021-02-23 RX ORDER — HALOPERIDOL 5 MG/ML
5 INJECTION INTRAMUSCULAR ONCE
Status: COMPLETED | OUTPATIENT
Start: 2021-02-23 | End: 2021-02-23

## 2021-02-23 RX ORDER — QUETIAPINE FUMARATE 100 MG/1
100 TABLET, FILM COATED ORAL DAILY
Status: DISCONTINUED | OUTPATIENT
Start: 2021-02-23 | End: 2021-02-24

## 2021-02-23 RX ADMIN — DEXMEDETOMIDINE HYDROCHLORIDE 1.5 MCG/KG/HR: 400 INJECTION, SOLUTION INTRAVENOUS at 23:45

## 2021-02-23 RX ADMIN — LORAZEPAM 2 MG: 2 INJECTION INTRAMUSCULAR; INTRAVENOUS at 02:30

## 2021-02-23 RX ADMIN — DEXTROSE MONOHYDRATE 100 ML/HR: 50 INJECTION, SOLUTION INTRAVENOUS at 20:56

## 2021-02-23 RX ADMIN — DIAZEPAM 10 MG: 5 INJECTION, SOLUTION INTRAMUSCULAR; INTRAVENOUS at 13:58

## 2021-02-23 RX ADMIN — LORAZEPAM 2 MG: 2 INJECTION INTRAMUSCULAR; INTRAVENOUS at 22:17

## 2021-02-23 RX ADMIN — LORAZEPAM 2 MG: 2 INJECTION INTRAMUSCULAR; INTRAVENOUS at 16:23

## 2021-02-23 RX ADMIN — HALOPERIDOL LACTATE 5 MG: 5 INJECTION, SOLUTION INTRAMUSCULAR at 03:15

## 2021-02-23 RX ADMIN — DEXMEDETOMIDINE HYDROCHLORIDE 1.5 MCG/KG/HR: 400 INJECTION, SOLUTION INTRAVENOUS at 18:29

## 2021-02-23 RX ADMIN — Medication 10 MG: at 20:42

## 2021-02-23 RX ADMIN — DEXMEDETOMIDINE HYDROCHLORIDE 1.5 MCG/KG/HR: 400 INJECTION, SOLUTION INTRAVENOUS at 04:11

## 2021-02-23 RX ADMIN — DIAZEPAM 10 MG: 5 INJECTION, SOLUTION INTRAMUSCULAR; INTRAVENOUS at 20:24

## 2021-02-23 RX ADMIN — DEXMEDETOMIDINE HYDROCHLORIDE 1.5 MCG/KG/HR: 400 INJECTION, SOLUTION INTRAVENOUS at 01:08

## 2021-02-23 RX ADMIN — LORAZEPAM 2 MG: 2 INJECTION INTRAMUSCULAR; INTRAVENOUS at 03:19

## 2021-02-23 RX ADMIN — DEXMEDETOMIDINE HYDROCHLORIDE 1.5 MCG/KG/HR: 400 INJECTION, SOLUTION INTRAVENOUS at 12:56

## 2021-02-23 RX ADMIN — ENOXAPARIN SODIUM 40 MG: 40 INJECTION SUBCUTANEOUS at 09:49

## 2021-02-23 RX ADMIN — DEXMEDETOMIDINE HYDROCHLORIDE 1.5 MCG/KG/HR: 400 INJECTION, SOLUTION INTRAVENOUS at 09:49

## 2021-02-23 RX ADMIN — LEVOTHYROXINE SODIUM 50 MCG: 20 INJECTION, SOLUTION INTRAVENOUS at 11:17

## 2021-02-23 RX ADMIN — LORAZEPAM 2 MG: 2 INJECTION INTRAMUSCULAR; INTRAVENOUS at 07:16

## 2021-02-23 RX ADMIN — THIAMINE HYDROCHLORIDE 500 MG: 100 INJECTION, SOLUTION INTRAMUSCULAR; INTRAVENOUS at 21:02

## 2021-02-23 RX ADMIN — GABAPENTIN 300 MG: 300 CAPSULE ORAL at 10:44

## 2021-02-23 RX ADMIN — QUETIAPINE FUMARATE 100 MG: 100 TABLET ORAL at 10:44

## 2021-02-23 RX ADMIN — DEXMEDETOMIDINE HYDROCHLORIDE 1.5 MCG/KG/HR: 400 INJECTION, SOLUTION INTRAVENOUS at 21:22

## 2021-02-23 RX ADMIN — FOLIC ACID 1 MG: 1 TABLET ORAL at 13:03

## 2021-02-23 RX ADMIN — PANTOPRAZOLE SODIUM 40 MG: 40 INJECTION, POWDER, FOR SOLUTION INTRAVENOUS at 16:30

## 2021-02-23 RX ADMIN — INSULIN HUMAN 2 UNITS: 100 INJECTION, SOLUTION PARENTERAL at 18:33

## 2021-02-23 RX ADMIN — MULTIPLE VITAMINS W/ MINERALS TAB 1 TABLET: TAB at 13:03

## 2021-02-23 RX ADMIN — DEXMEDETOMIDINE HYDROCHLORIDE 1.5 MCG/KG/HR: 400 INJECTION, SOLUTION INTRAVENOUS at 07:07

## 2021-02-23 RX ADMIN — SODIUM CHLORIDE, PRESERVATIVE FREE 10 ML: 5 INJECTION INTRAVENOUS at 10:10

## 2021-02-23 RX ADMIN — LORAZEPAM 1 MG: 2 INJECTION INTRAMUSCULAR; INTRAVENOUS at 20:52

## 2021-02-23 RX ADMIN — LORAZEPAM 2 MG: 2 INJECTION INTRAMUSCULAR; INTRAVENOUS at 12:06

## 2021-02-23 RX ADMIN — SODIUM CHLORIDE, PRESERVATIVE FREE 10 ML: 5 INJECTION INTRAVENOUS at 20:43

## 2021-02-23 RX ADMIN — QUETIAPINE FUMARATE 200 MG: 100 TABLET ORAL at 20:42

## 2021-02-23 RX ADMIN — PANTOPRAZOLE SODIUM 40 MG: 40 INJECTION, POWDER, FOR SOLUTION INTRAVENOUS at 06:35

## 2021-02-23 RX ADMIN — GABAPENTIN 600 MG: 300 CAPSULE ORAL at 20:42

## 2021-02-23 RX ADMIN — LORAZEPAM 2 MG: 2 INJECTION INTRAMUSCULAR; INTRAVENOUS at 01:42

## 2021-02-23 RX ADMIN — Medication 20 MG: at 06:20

## 2021-02-23 RX ADMIN — SODIUM CHLORIDE, POTASSIUM CHLORIDE, SODIUM LACTATE AND CALCIUM CHLORIDE 125 ML/HR: 600; 310; 30; 20 INJECTION, SOLUTION INTRAVENOUS at 05:58

## 2021-02-23 RX ADMIN — DIAZEPAM 10 MG: 5 INJECTION, SOLUTION INTRAMUSCULAR; INTRAVENOUS at 05:58

## 2021-02-23 RX ADMIN — THIAMINE HYDROCHLORIDE 500 MG: 100 INJECTION, SOLUTION INTRAMUSCULAR; INTRAVENOUS at 13:04

## 2021-02-23 RX ADMIN — LORAZEPAM 2 MG: 2 INJECTION INTRAMUSCULAR; INTRAVENOUS at 02:42

## 2021-02-23 RX ADMIN — DEXTROSE MONOHYDRATE 100 ML/HR: 50 INJECTION, SOLUTION INTRAVENOUS at 11:17

## 2021-02-23 RX ADMIN — DEXMEDETOMIDINE HYDROCHLORIDE 1.5 MCG/KG/HR: 400 INJECTION, SOLUTION INTRAVENOUS at 15:43

## 2021-02-23 RX ADMIN — LORAZEPAM 2 MG: 2 INJECTION INTRAMUSCULAR; INTRAVENOUS at 10:17

## 2021-02-23 RX ADMIN — ENALAPRILAT 1.25 MG: 1.25 INJECTION INTRAVENOUS at 00:08

## 2021-02-24 LAB
ALBUMIN SERPL-MCNC: 3.4 G/DL (ref 3.5–5.2)
ALBUMIN/GLOB SERPL: 1.3 G/DL
ALP SERPL-CCNC: 147 U/L (ref 39–117)
ALT SERPL W P-5'-P-CCNC: 32 U/L (ref 1–33)
ANION GAP SERPL CALCULATED.3IONS-SCNC: 10 MMOL/L (ref 5–15)
AST SERPL-CCNC: 41 U/L (ref 1–32)
BASOPHILS # BLD AUTO: 0.01 10*3/MM3 (ref 0–0.2)
BASOPHILS NFR BLD AUTO: 0.1 % (ref 0–1.5)
BILIRUB SERPL-MCNC: 0.3 MG/DL (ref 0–1.2)
BUN SERPL-MCNC: 8 MG/DL (ref 6–20)
BUN/CREAT SERPL: 10.5 (ref 7–25)
CALCIUM SPEC-SCNC: 8.7 MG/DL (ref 8.6–10.5)
CHLORIDE SERPL-SCNC: 109 MMOL/L (ref 98–107)
CO2 SERPL-SCNC: 26 MMOL/L (ref 22–29)
CREAT SERPL-MCNC: 0.76 MG/DL (ref 0.57–1)
DEPRECATED RDW RBC AUTO: 68.2 FL (ref 37–54)
EOSINOPHIL # BLD AUTO: 0.19 10*3/MM3 (ref 0–0.4)
EOSINOPHIL NFR BLD AUTO: 2.5 % (ref 0.3–6.2)
ERYTHROCYTE [DISTWIDTH] IN BLOOD BY AUTOMATED COUNT: 22.4 % (ref 12.3–15.4)
GFR SERPL CREATININE-BSD FRML MDRD: 82 ML/MIN/1.73
GLOBULIN UR ELPH-MCNC: 2.7 GM/DL
GLUCOSE BLDC GLUCOMTR-MCNC: 137 MG/DL (ref 70–130)
GLUCOSE BLDC GLUCOMTR-MCNC: 141 MG/DL (ref 70–130)
GLUCOSE BLDC GLUCOMTR-MCNC: 156 MG/DL (ref 70–130)
GLUCOSE BLDC GLUCOMTR-MCNC: 158 MG/DL (ref 70–130)
GLUCOSE BLDC GLUCOMTR-MCNC: 161 MG/DL (ref 70–130)
GLUCOSE SERPL-MCNC: 162 MG/DL (ref 65–99)
HCT VFR BLD AUTO: 37 % (ref 34–46.6)
HGB BLD-MCNC: 10.4 G/DL (ref 12–15.9)
IMM GRANULOCYTES # BLD AUTO: 0.08 10*3/MM3 (ref 0–0.05)
IMM GRANULOCYTES NFR BLD AUTO: 1.1 % (ref 0–0.5)
LYMPHOCYTES # BLD AUTO: 1.4 10*3/MM3 (ref 0.7–3.1)
LYMPHOCYTES NFR BLD AUTO: 18.5 % (ref 19.6–45.3)
MAGNESIUM SERPL-MCNC: 1.9 MG/DL (ref 1.6–2.6)
MCH RBC QN AUTO: 24.3 PG (ref 26.6–33)
MCHC RBC AUTO-ENTMCNC: 28.1 G/DL (ref 31.5–35.7)
MCV RBC AUTO: 86.4 FL (ref 79–97)
MONOCYTES # BLD AUTO: 0.22 10*3/MM3 (ref 0.1–0.9)
MONOCYTES NFR BLD AUTO: 2.9 % (ref 5–12)
MRSA DNA SPEC QL NAA+PROBE: NEGATIVE
NEUTROPHILS NFR BLD AUTO: 5.65 10*3/MM3 (ref 1.7–7)
NEUTROPHILS NFR BLD AUTO: 74.9 % (ref 42.7–76)
NRBC BLD AUTO-RTO: 0.3 /100 WBC (ref 0–0.2)
PHOSPHATE SERPL-MCNC: 1.7 MG/DL (ref 2.5–4.5)
PHOSPHATE SERPL-MCNC: 2 MG/DL (ref 2.5–4.5)
PLATELET # BLD AUTO: 232 10*3/MM3 (ref 140–450)
PMV BLD AUTO: 10 FL (ref 6–12)
POTASSIUM SERPL-SCNC: 3.4 MMOL/L (ref 3.5–5.2)
POTASSIUM SERPL-SCNC: 3.9 MMOL/L (ref 3.5–5.2)
PROT SERPL-MCNC: 6.1 G/DL (ref 6–8.5)
RBC # BLD AUTO: 4.28 10*6/MM3 (ref 3.77–5.28)
SODIUM SERPL-SCNC: 145 MMOL/L (ref 136–145)
WBC # BLD AUTO: 7.55 10*3/MM3 (ref 3.4–10.8)

## 2021-02-24 PROCEDURE — 25010000002 DIAZEPAM PER 5 MG: Performed by: INTERNAL MEDICINE

## 2021-02-24 PROCEDURE — 25010000002 LORAZEPAM PER 2 MG: Performed by: INTERNAL MEDICINE

## 2021-02-24 PROCEDURE — 82962 GLUCOSE BLOOD TEST: CPT

## 2021-02-24 PROCEDURE — 94799 UNLISTED PULMONARY SVC/PX: CPT

## 2021-02-24 PROCEDURE — 63710000001 INSULIN REGULAR HUMAN PER 5 UNITS: Performed by: INTERNAL MEDICINE

## 2021-02-24 PROCEDURE — 25010000002 ENOXAPARIN PER 10 MG: Performed by: NURSE PRACTITIONER

## 2021-02-24 PROCEDURE — 83735 ASSAY OF MAGNESIUM: CPT | Performed by: INTERNAL MEDICINE

## 2021-02-24 PROCEDURE — 84132 ASSAY OF SERUM POTASSIUM: CPT | Performed by: INTERNAL MEDICINE

## 2021-02-24 PROCEDURE — 84100 ASSAY OF PHOSPHORUS: CPT | Performed by: INTERNAL MEDICINE

## 2021-02-24 PROCEDURE — 87641 MR-STAPH DNA AMP PROBE: CPT | Performed by: INTERNAL MEDICINE

## 2021-02-24 PROCEDURE — 25010000002 ZIPRASIDONE MESYLATE PER 10 MG: Performed by: INTERNAL MEDICINE

## 2021-02-24 PROCEDURE — 25010000002 PIPERACILLIN SOD-TAZOBACTAM PER 1 G: Performed by: INTERNAL MEDICINE

## 2021-02-24 PROCEDURE — 25010000002 THIAMINE PER 100 MG: Performed by: INTERNAL MEDICINE

## 2021-02-24 PROCEDURE — 25010000003 MAGNESIUM SULFATE 4 GM/100ML SOLUTION: Performed by: INTERNAL MEDICINE

## 2021-02-24 PROCEDURE — 80053 COMPREHEN METABOLIC PANEL: CPT | Performed by: INTERNAL MEDICINE

## 2021-02-24 PROCEDURE — 85025 COMPLETE CBC W/AUTO DIFF WBC: CPT | Performed by: INTERNAL MEDICINE

## 2021-02-24 PROCEDURE — 99291 CRITICAL CARE FIRST HOUR: CPT | Performed by: INTERNAL MEDICINE

## 2021-02-24 PROCEDURE — 25010000002 LORAZEPAM PER 2 MG: Performed by: NURSE PRACTITIONER

## 2021-02-24 RX ORDER — LORAZEPAM 2 MG/ML
2 INJECTION INTRAMUSCULAR
Status: DISCONTINUED | OUTPATIENT
Start: 2021-02-24 | End: 2021-03-05

## 2021-02-24 RX ORDER — FOLIC ACID 1 MG/1
1 TABLET ORAL DAILY
Status: DISCONTINUED | OUTPATIENT
Start: 2021-02-25 | End: 2021-03-09

## 2021-02-24 RX ORDER — PAROXETINE HYDROCHLORIDE 20 MG/1
60 TABLET, FILM COATED ORAL EVERY MORNING
Status: DISCONTINUED | OUTPATIENT
Start: 2021-02-25 | End: 2021-02-24

## 2021-02-24 RX ORDER — DIAZEPAM 5 MG/ML
20 INJECTION, SOLUTION INTRAMUSCULAR; INTRAVENOUS EVERY 6 HOURS
Status: DISCONTINUED | OUTPATIENT
Start: 2021-02-24 | End: 2021-02-25

## 2021-02-24 RX ORDER — POTASSIUM CHLORIDE 1.5 G/1.77G
40 POWDER, FOR SOLUTION ORAL AS NEEDED
Status: DISCONTINUED | OUTPATIENT
Start: 2021-02-24 | End: 2021-03-11 | Stop reason: HOSPADM

## 2021-02-24 RX ORDER — MAGNESIUM SULFATE HEPTAHYDRATE 40 MG/ML
4 INJECTION, SOLUTION INTRAVENOUS AS NEEDED
Status: DISCONTINUED | OUTPATIENT
Start: 2021-02-24 | End: 2021-03-05

## 2021-02-24 RX ORDER — MAGNESIUM SULFATE HEPTAHYDRATE 40 MG/ML
2 INJECTION, SOLUTION INTRAVENOUS AS NEEDED
Status: DISCONTINUED | OUTPATIENT
Start: 2021-02-24 | End: 2021-02-27

## 2021-02-24 RX ORDER — LORAZEPAM 2 MG/ML
4 INJECTION INTRAMUSCULAR
Status: DISCONTINUED | OUTPATIENT
Start: 2021-02-24 | End: 2021-03-05

## 2021-02-24 RX ORDER — POTASSIUM CHLORIDE 750 MG/1
40 CAPSULE, EXTENDED RELEASE ORAL AS NEEDED
Status: DISCONTINUED | OUTPATIENT
Start: 2021-02-24 | End: 2021-02-27

## 2021-02-24 RX ORDER — ZIPRASIDONE MESYLATE 20 MG/ML
20 INJECTION, POWDER, LYOPHILIZED, FOR SOLUTION INTRAMUSCULAR ONCE
Status: COMPLETED | OUTPATIENT
Start: 2021-02-24 | End: 2021-02-24

## 2021-02-24 RX ORDER — QUETIAPINE FUMARATE 100 MG/1
200 TABLET, FILM COATED ORAL DAILY
Status: DISCONTINUED | OUTPATIENT
Start: 2021-02-25 | End: 2021-03-04

## 2021-02-24 RX ORDER — LORAZEPAM 2 MG/ML
2 INJECTION INTRAMUSCULAR ONCE
Status: COMPLETED | OUTPATIENT
Start: 2021-02-24 | End: 2021-02-24

## 2021-02-24 RX ORDER — WATER 1000 ML/1000ML
INJECTION, SOLUTION INTRAVENOUS
Status: COMPLETED
Start: 2021-02-24 | End: 2021-02-24

## 2021-02-24 RX ORDER — POTASSIUM CHLORIDE 7.45 MG/ML
10 INJECTION INTRAVENOUS
Status: DISCONTINUED | OUTPATIENT
Start: 2021-02-24 | End: 2021-03-11 | Stop reason: HOSPADM

## 2021-02-24 RX ORDER — ZIPRASIDONE MESYLATE 20 MG/ML
20 INJECTION, POWDER, LYOPHILIZED, FOR SOLUTION INTRAMUSCULAR EVERY 6 HOURS PRN
Status: DISCONTINUED | OUTPATIENT
Start: 2021-02-24 | End: 2021-03-04

## 2021-02-24 RX ORDER — IPRATROPIUM BROMIDE AND ALBUTEROL SULFATE 2.5; .5 MG/3ML; MG/3ML
3 SOLUTION RESPIRATORY (INHALATION)
Status: DISCONTINUED | OUTPATIENT
Start: 2021-02-24 | End: 2021-02-25

## 2021-02-24 RX ORDER — DIAZEPAM 5 MG/ML
10 INJECTION, SOLUTION INTRAMUSCULAR; INTRAVENOUS ONCE
Status: COMPLETED | OUTPATIENT
Start: 2021-02-24 | End: 2021-02-24

## 2021-02-24 RX ADMIN — ZIPRASIDONE MESYLATE 20 MG: 20 INJECTION, POWDER, LYOPHILIZED, FOR SOLUTION INTRAMUSCULAR at 18:55

## 2021-02-24 RX ADMIN — DEXMEDETOMIDINE HYDROCHLORIDE 1.5 MCG/KG/HR: 400 INJECTION, SOLUTION INTRAVENOUS at 16:33

## 2021-02-24 RX ADMIN — THIAMINE HYDROCHLORIDE 500 MG: 100 INJECTION, SOLUTION INTRAMUSCULAR; INTRAVENOUS at 20:45

## 2021-02-24 RX ADMIN — MULTIPLE VITAMINS W/ MINERALS TAB 1 TABLET: TAB at 08:02

## 2021-02-24 RX ADMIN — DEXMEDETOMIDINE HYDROCHLORIDE 1.5 MCG/KG/HR: 400 INJECTION, SOLUTION INTRAVENOUS at 02:23

## 2021-02-24 RX ADMIN — DEXMEDETOMIDINE HYDROCHLORIDE 1.5 MCG/KG/HR: 400 INJECTION, SOLUTION INTRAVENOUS at 22:28

## 2021-02-24 RX ADMIN — DEXMEDETOMIDINE HYDROCHLORIDE 1.5 MCG/KG/HR: 400 INJECTION, SOLUTION INTRAVENOUS at 04:43

## 2021-02-24 RX ADMIN — DEXTROSE MONOHYDRATE 100 ML/HR: 50 INJECTION, SOLUTION INTRAVENOUS at 04:43

## 2021-02-24 RX ADMIN — LORAZEPAM 2 MG: 2 INJECTION INTRAMUSCULAR; INTRAVENOUS at 04:34

## 2021-02-24 RX ADMIN — LORAZEPAM 4 MG: 2 INJECTION INTRAMUSCULAR; INTRAVENOUS at 10:27

## 2021-02-24 RX ADMIN — ENOXAPARIN SODIUM 40 MG: 40 INJECTION SUBCUTANEOUS at 08:02

## 2021-02-24 RX ADMIN — Medication 10 MG: at 20:44

## 2021-02-24 RX ADMIN — INSULIN HUMAN 2 UNITS: 100 INJECTION, SOLUTION PARENTERAL at 01:10

## 2021-02-24 RX ADMIN — PAROXETINE HYDROCHLORIDE 60 MG: 20 TABLET, FILM COATED ORAL at 06:23

## 2021-02-24 RX ADMIN — LORAZEPAM 4 MG: 2 INJECTION INTRAMUSCULAR; INTRAVENOUS at 11:22

## 2021-02-24 RX ADMIN — DIAZEPAM 20 MG: 5 INJECTION, SOLUTION INTRAMUSCULAR; INTRAVENOUS at 18:02

## 2021-02-24 RX ADMIN — ACETAMINOPHEN 650 MG: 160 SOLUTION ORAL at 11:30

## 2021-02-24 RX ADMIN — MAGNESIUM SULFATE HEPTAHYDRATE 4 G: 40 INJECTION, SOLUTION INTRAVENOUS at 09:45

## 2021-02-24 RX ADMIN — LEVOTHYROXINE SODIUM 50 MCG: 20 INJECTION, SOLUTION INTRAVENOUS at 10:12

## 2021-02-24 RX ADMIN — LORAZEPAM 2 MG: 2 INJECTION INTRAMUSCULAR; INTRAVENOUS at 03:42

## 2021-02-24 RX ADMIN — ACETAMINOPHEN 649.6 MG: 160 SOLUTION ORAL at 04:43

## 2021-02-24 RX ADMIN — DIAZEPAM 10 MG: 5 INJECTION, SOLUTION INTRAMUSCULAR; INTRAVENOUS at 10:13

## 2021-02-24 RX ADMIN — POTASSIUM & SODIUM PHOSPHATES POWDER PACK 280-160-250 MG 2 PACKET: 280-160-250 PACK at 20:44

## 2021-02-24 RX ADMIN — TAZOBACTAM SODIUM AND PIPERACILLIN SODIUM 3.38 G: 375; 3 INJECTION, SOLUTION INTRAVENOUS at 20:01

## 2021-02-24 RX ADMIN — PANTOPRAZOLE SODIUM 40 MG: 40 INJECTION, POWDER, FOR SOLUTION INTRAVENOUS at 06:23

## 2021-02-24 RX ADMIN — SODIUM CHLORIDE, PRESERVATIVE FREE 10 ML: 5 INJECTION INTRAVENOUS at 20:44

## 2021-02-24 RX ADMIN — DIAZEPAM 10 MG: 5 INJECTION, SOLUTION INTRAMUSCULAR; INTRAVENOUS at 06:23

## 2021-02-24 RX ADMIN — FOLIC ACID 1 MG: 1 TABLET ORAL at 08:02

## 2021-02-24 RX ADMIN — DEXMEDETOMIDINE HYDROCHLORIDE 1.5 MCG/KG/HR: 400 INJECTION, SOLUTION INTRAVENOUS at 19:32

## 2021-02-24 RX ADMIN — POTASSIUM CHLORIDE 40 MEQ: 1.5 POWDER, FOR SOLUTION ORAL at 14:09

## 2021-02-24 RX ADMIN — WATER 10 ML: 1 INJECTION INTRAMUSCULAR; INTRAVENOUS; SUBCUTANEOUS at 12:47

## 2021-02-24 RX ADMIN — DIAZEPAM 20 MG: 5 INJECTION, SOLUTION INTRAMUSCULAR; INTRAVENOUS at 11:03

## 2021-02-24 RX ADMIN — DIAZEPAM 20 MG: 5 INJECTION, SOLUTION INTRAMUSCULAR; INTRAVENOUS at 23:31

## 2021-02-24 RX ADMIN — INSULIN HUMAN 2 UNITS: 100 INJECTION, SOLUTION PARENTERAL at 23:32

## 2021-02-24 RX ADMIN — DEXMEDETOMIDINE HYDROCHLORIDE 1.5 MCG/KG/HR: 400 INJECTION, SOLUTION INTRAVENOUS at 13:26

## 2021-02-24 RX ADMIN — LORAZEPAM 4 MG: 2 INJECTION INTRAMUSCULAR; INTRAVENOUS at 12:01

## 2021-02-24 RX ADMIN — INSULIN HUMAN 2 UNITS: 100 INJECTION, SOLUTION PARENTERAL at 18:01

## 2021-02-24 RX ADMIN — LORAZEPAM 2 MG: 2 INJECTION INTRAMUSCULAR; INTRAVENOUS at 12:15

## 2021-02-24 RX ADMIN — PANTOPRAZOLE SODIUM 40 MG: 40 INJECTION, POWDER, FOR SOLUTION INTRAVENOUS at 16:56

## 2021-02-24 RX ADMIN — DIAZEPAM 10 MG: 5 INJECTION, SOLUTION INTRAMUSCULAR; INTRAVENOUS at 01:10

## 2021-02-24 RX ADMIN — IPRATROPIUM BROMIDE AND ALBUTEROL SULFATE 3 ML: 2.5; .5 SOLUTION RESPIRATORY (INHALATION) at 21:37

## 2021-02-24 RX ADMIN — THIAMINE HYDROCHLORIDE 500 MG: 100 INJECTION, SOLUTION INTRAMUSCULAR; INTRAVENOUS at 04:16

## 2021-02-24 RX ADMIN — QUETIAPINE FUMARATE 200 MG: 100 TABLET ORAL at 20:44

## 2021-02-24 RX ADMIN — QUETIAPINE FUMARATE 100 MG: 100 TABLET ORAL at 04:42

## 2021-02-24 RX ADMIN — ZIPRASIDONE MESYLATE 20 MG: 20 INJECTION, POWDER, LYOPHILIZED, FOR SOLUTION INTRAMUSCULAR at 12:47

## 2021-02-24 RX ADMIN — DEXMEDETOMIDINE HYDROCHLORIDE 1.5 MCG/KG/HR: 400 INJECTION, SOLUTION INTRAVENOUS at 06:41

## 2021-02-24 RX ADMIN — THIAMINE HYDROCHLORIDE 500 MG: 100 INJECTION, SOLUTION INTRAMUSCULAR; INTRAVENOUS at 14:09

## 2021-02-24 RX ADMIN — GABAPENTIN 300 MG: 300 CAPSULE ORAL at 06:23

## 2021-02-24 RX ADMIN — LORAZEPAM 2 MG: 2 INJECTION INTRAMUSCULAR; INTRAVENOUS at 04:11

## 2021-02-24 RX ADMIN — DEXMEDETOMIDINE HYDROCHLORIDE 1.5 MCG/KG/HR: 400 INJECTION, SOLUTION INTRAVENOUS at 07:54

## 2021-02-24 RX ADMIN — LORAZEPAM 4 MG: 2 INJECTION INTRAMUSCULAR; INTRAVENOUS at 18:28

## 2021-02-24 RX ADMIN — POTASSIUM CHLORIDE 40 MEQ: 1.5 POWDER, FOR SOLUTION ORAL at 09:45

## 2021-02-24 RX ADMIN — TAZOBACTAM SODIUM AND PIPERACILLIN SODIUM 3.38 G: 375; 3 INJECTION, SOLUTION INTRAVENOUS at 14:09

## 2021-02-24 RX ADMIN — POTASSIUM & SODIUM PHOSPHATES POWDER PACK 280-160-250 MG 2 PACKET: 280-160-250 PACK at 09:45

## 2021-02-24 RX ADMIN — DEXMEDETOMIDINE HYDROCHLORIDE 1.5 MCG/KG/HR: 400 INJECTION, SOLUTION INTRAVENOUS at 10:45

## 2021-02-25 ENCOUNTER — APPOINTMENT (OUTPATIENT)
Dept: GENERAL RADIOLOGY | Facility: HOSPITAL | Age: 46
End: 2021-02-25

## 2021-02-25 LAB
ALBUMIN SERPL-MCNC: 2.9 G/DL (ref 3.5–5.2)
ALBUMIN/GLOB SERPL: 1 G/DL
ALP SERPL-CCNC: 133 U/L (ref 39–117)
ALT SERPL W P-5'-P-CCNC: 32 U/L (ref 1–33)
ANION GAP SERPL CALCULATED.3IONS-SCNC: 6 MMOL/L (ref 5–15)
AST SERPL-CCNC: 51 U/L (ref 1–32)
BASOPHILS # BLD AUTO: 0.02 10*3/MM3 (ref 0–0.2)
BASOPHILS NFR BLD AUTO: 0.3 % (ref 0–1.5)
BILIRUB SERPL-MCNC: 0.3 MG/DL (ref 0–1.2)
BUN SERPL-MCNC: 14 MG/DL (ref 6–20)
BUN/CREAT SERPL: 18.7 (ref 7–25)
CALCIUM SPEC-SCNC: 8.5 MG/DL (ref 8.6–10.5)
CHLORIDE SERPL-SCNC: 118 MMOL/L (ref 98–107)
CO2 SERPL-SCNC: 25 MMOL/L (ref 22–29)
CREAT SERPL-MCNC: 0.75 MG/DL (ref 0.57–1)
DEPRECATED RDW RBC AUTO: 69.6 FL (ref 37–54)
EOSINOPHIL # BLD AUTO: 0.14 10*3/MM3 (ref 0–0.4)
EOSINOPHIL NFR BLD AUTO: 1.9 % (ref 0.3–6.2)
ERYTHROCYTE [DISTWIDTH] IN BLOOD BY AUTOMATED COUNT: 22.4 % (ref 12.3–15.4)
GFR SERPL CREATININE-BSD FRML MDRD: 83 ML/MIN/1.73
GLOBULIN UR ELPH-MCNC: 2.9 GM/DL
GLUCOSE BLDC GLUCOMTR-MCNC: 134 MG/DL (ref 70–130)
GLUCOSE BLDC GLUCOMTR-MCNC: 144 MG/DL (ref 70–130)
GLUCOSE BLDC GLUCOMTR-MCNC: 163 MG/DL (ref 70–130)
GLUCOSE BLDC GLUCOMTR-MCNC: 164 MG/DL (ref 70–130)
GLUCOSE SERPL-MCNC: 140 MG/DL (ref 65–99)
HCT VFR BLD AUTO: 33.9 % (ref 34–46.6)
HGB BLD-MCNC: 9.3 G/DL (ref 12–15.9)
IMM GRANULOCYTES # BLD AUTO: 0.11 10*3/MM3 (ref 0–0.05)
IMM GRANULOCYTES NFR BLD AUTO: 1.5 % (ref 0–0.5)
LYMPHOCYTES # BLD AUTO: 1.5 10*3/MM3 (ref 0.7–3.1)
LYMPHOCYTES NFR BLD AUTO: 20.3 % (ref 19.6–45.3)
MAGNESIUM SERPL-MCNC: 2.4 MG/DL (ref 1.6–2.6)
MCH RBC QN AUTO: 24.2 PG (ref 26.6–33)
MCHC RBC AUTO-ENTMCNC: 27.4 G/DL (ref 31.5–35.7)
MCV RBC AUTO: 88.3 FL (ref 79–97)
MONOCYTES # BLD AUTO: 0.26 10*3/MM3 (ref 0.1–0.9)
MONOCYTES NFR BLD AUTO: 3.5 % (ref 5–12)
NEUTROPHILS NFR BLD AUTO: 5.37 10*3/MM3 (ref 1.7–7)
NEUTROPHILS NFR BLD AUTO: 72.5 % (ref 42.7–76)
NRBC BLD AUTO-RTO: 2.8 /100 WBC (ref 0–0.2)
PHOSPHATE SERPL-MCNC: 2.6 MG/DL (ref 2.5–4.5)
PLATELET # BLD AUTO: 173 10*3/MM3 (ref 140–450)
PMV BLD AUTO: 9.7 FL (ref 6–12)
POTASSIUM SERPL-SCNC: 3.7 MMOL/L (ref 3.5–5.2)
PROCALCITONIN SERPL-MCNC: 0.23 NG/ML (ref 0–0.25)
PROT SERPL-MCNC: 5.8 G/DL (ref 6–8.5)
RBC # BLD AUTO: 3.84 10*6/MM3 (ref 3.77–5.28)
SODIUM SERPL-SCNC: 149 MMOL/L (ref 136–145)
WBC # BLD AUTO: 7.4 10*3/MM3 (ref 3.4–10.8)

## 2021-02-25 PROCEDURE — 82962 GLUCOSE BLOOD TEST: CPT

## 2021-02-25 PROCEDURE — 83735 ASSAY OF MAGNESIUM: CPT | Performed by: INTERNAL MEDICINE

## 2021-02-25 PROCEDURE — 25010000002 ZIPRASIDONE MESYLATE PER 10 MG: Performed by: INTERNAL MEDICINE

## 2021-02-25 PROCEDURE — 84100 ASSAY OF PHOSPHORUS: CPT | Performed by: INTERNAL MEDICINE

## 2021-02-25 PROCEDURE — 85025 COMPLETE CBC W/AUTO DIFF WBC: CPT | Performed by: INTERNAL MEDICINE

## 2021-02-25 PROCEDURE — 71045 X-RAY EXAM CHEST 1 VIEW: CPT

## 2021-02-25 PROCEDURE — 25010000002 ENOXAPARIN PER 10 MG: Performed by: NURSE PRACTITIONER

## 2021-02-25 PROCEDURE — 25010000002 LORAZEPAM PER 2 MG: Performed by: INTERNAL MEDICINE

## 2021-02-25 PROCEDURE — 25010000002 PIPERACILLIN SOD-TAZOBACTAM PER 1 G: Performed by: INTERNAL MEDICINE

## 2021-02-25 PROCEDURE — 25010000002 THIAMINE PER 100 MG: Performed by: INTERNAL MEDICINE

## 2021-02-25 PROCEDURE — 84145 PROCALCITONIN (PCT): CPT | Performed by: INTERNAL MEDICINE

## 2021-02-25 PROCEDURE — 80053 COMPREHEN METABOLIC PANEL: CPT | Performed by: INTERNAL MEDICINE

## 2021-02-25 PROCEDURE — 25810000003 DEXTROSE 5 % WITH KCL 20 MEQ 20-5 MEQ/L-% SOLUTION: Performed by: INTERNAL MEDICINE

## 2021-02-25 PROCEDURE — 25010000002 DIAZEPAM PER 5 MG: Performed by: INTERNAL MEDICINE

## 2021-02-25 PROCEDURE — 63710000001 INSULIN REGULAR HUMAN PER 5 UNITS: Performed by: INTERNAL MEDICINE

## 2021-02-25 PROCEDURE — 99291 CRITICAL CARE FIRST HOUR: CPT | Performed by: INTERNAL MEDICINE

## 2021-02-25 RX ORDER — IPRATROPIUM BROMIDE AND ALBUTEROL SULFATE 2.5; .5 MG/3ML; MG/3ML
3 SOLUTION RESPIRATORY (INHALATION) EVERY 6 HOURS PRN
Status: DISCONTINUED | OUTPATIENT
Start: 2021-02-25 | End: 2021-03-05

## 2021-02-25 RX ORDER — LEVOTHYROXINE SODIUM 88 UG/1
88 TABLET ORAL
Status: DISCONTINUED | OUTPATIENT
Start: 2021-02-26 | End: 2021-03-09

## 2021-02-25 RX ORDER — DOCUSATE SODIUM 50 MG/5 ML
100 LIQUID (ML) ORAL 2 TIMES DAILY
Status: DISCONTINUED | OUTPATIENT
Start: 2021-02-25 | End: 2021-03-05

## 2021-02-25 RX ORDER — LEVOTHYROXINE SODIUM 88 UG/1
88 TABLET ORAL
Status: DISCONTINUED | OUTPATIENT
Start: 2021-02-26 | End: 2021-02-25

## 2021-02-25 RX ORDER — DIAZEPAM 5 MG/1
30 TABLET ORAL EVERY 6 HOURS
Status: DISCONTINUED | OUTPATIENT
Start: 2021-02-25 | End: 2021-03-04

## 2021-02-25 RX ORDER — WATER 1000 ML/1000ML
INJECTION, SOLUTION INTRAVENOUS
Status: COMPLETED
Start: 2021-02-25 | End: 2021-02-25

## 2021-02-25 RX ORDER — POTASSIUM CHLORIDE, DEXTROSE MONOHYDRATE 150; 5 MG/100ML; G/100ML
150 INJECTION, SOLUTION INTRAVENOUS CONTINUOUS
Status: DISCONTINUED | OUTPATIENT
Start: 2021-02-25 | End: 2021-02-26

## 2021-02-25 RX ORDER — LANSOPRAZOLE
30 KIT
Status: DISCONTINUED | OUTPATIENT
Start: 2021-02-25 | End: 2021-03-10

## 2021-02-25 RX ORDER — DIAZEPAM 5 MG/1
20 TABLET ORAL EVERY 6 HOURS
Status: DISCONTINUED | OUTPATIENT
Start: 2021-02-25 | End: 2021-02-25

## 2021-02-25 RX ORDER — DEXTROSE MONOHYDRATE 50 MG/ML
150 INJECTION, SOLUTION INTRAVENOUS CONTINUOUS
Status: DISCONTINUED | OUTPATIENT
Start: 2021-02-25 | End: 2021-02-25

## 2021-02-25 RX ADMIN — PANTOPRAZOLE SODIUM 40 MG: 40 INJECTION, POWDER, FOR SOLUTION INTRAVENOUS at 06:32

## 2021-02-25 RX ADMIN — POTASSIUM CHLORIDE AND DEXTROSE MONOHYDRATE 150 ML/HR: 150; 5 INJECTION, SOLUTION INTRAVENOUS at 14:55

## 2021-02-25 RX ADMIN — DIAZEPAM 30 MG: 5 TABLET ORAL at 12:15

## 2021-02-25 RX ADMIN — DOCUSATE SODIUM 100 MG: 50 LIQUID ORAL at 10:07

## 2021-02-25 RX ADMIN — DIAZEPAM 20 MG: 5 INJECTION, SOLUTION INTRAMUSCULAR; INTRAVENOUS at 06:32

## 2021-02-25 RX ADMIN — DIAZEPAM 30 MG: 5 TABLET ORAL at 23:33

## 2021-02-25 RX ADMIN — ZIPRASIDONE MESYLATE 20 MG: 20 INJECTION, POWDER, LYOPHILIZED, FOR SOLUTION INTRAMUSCULAR at 21:57

## 2021-02-25 RX ADMIN — DEXMEDETOMIDINE HYDROCHLORIDE 1.5 MCG/KG/HR: 400 INJECTION, SOLUTION INTRAVENOUS at 10:06

## 2021-02-25 RX ADMIN — DEXMEDETOMIDINE HYDROCHLORIDE 1.5 MCG/KG/HR: 400 INJECTION, SOLUTION INTRAVENOUS at 01:18

## 2021-02-25 RX ADMIN — LORAZEPAM 4 MG: 2 INJECTION INTRAMUSCULAR; INTRAVENOUS at 20:12

## 2021-02-25 RX ADMIN — DEXMEDETOMIDINE HYDROCHLORIDE 1.5 MCG/KG/HR: 400 INJECTION, SOLUTION INTRAVENOUS at 15:01

## 2021-02-25 RX ADMIN — LORAZEPAM 4 MG: 2 INJECTION INTRAMUSCULAR; INTRAVENOUS at 02:38

## 2021-02-25 RX ADMIN — FOLIC ACID 1 MG: 1 TABLET ORAL at 08:07

## 2021-02-25 RX ADMIN — POTASSIUM CHLORIDE AND DEXTROSE MONOHYDRATE 150 ML/HR: 150; 5 INJECTION, SOLUTION INTRAVENOUS at 20:39

## 2021-02-25 RX ADMIN — LANSOPRAZOLE 30 MG: KIT at 17:53

## 2021-02-25 RX ADMIN — SENNOSIDES 10 ML: 8.8 LIQUID ORAL at 10:07

## 2021-02-25 RX ADMIN — TAZOBACTAM SODIUM AND PIPERACILLIN SODIUM 3.38 G: 375; 3 INJECTION, SOLUTION INTRAVENOUS at 20:13

## 2021-02-25 RX ADMIN — ZIPRASIDONE MESYLATE 20 MG: 20 INJECTION, POWDER, LYOPHILIZED, FOR SOLUTION INTRAMUSCULAR at 10:39

## 2021-02-25 RX ADMIN — ZIPRASIDONE MESYLATE 20 MG: 20 INJECTION, POWDER, LYOPHILIZED, FOR SOLUTION INTRAMUSCULAR at 16:07

## 2021-02-25 RX ADMIN — Medication 20 MG: at 12:15

## 2021-02-25 RX ADMIN — QUETIAPINE FUMARATE 200 MG: 100 TABLET ORAL at 20:13

## 2021-02-25 RX ADMIN — ENOXAPARIN SODIUM 40 MG: 40 INJECTION SUBCUTANEOUS at 08:07

## 2021-02-25 RX ADMIN — LORAZEPAM 4 MG: 2 INJECTION INTRAMUSCULAR; INTRAVENOUS at 15:01

## 2021-02-25 RX ADMIN — QUETIAPINE FUMARATE 200 MG: 100 TABLET ORAL at 08:07

## 2021-02-25 RX ADMIN — TAZOBACTAM SODIUM AND PIPERACILLIN SODIUM 3.38 G: 375; 3 INJECTION, SOLUTION INTRAVENOUS at 04:02

## 2021-02-25 RX ADMIN — LORAZEPAM 2 MG: 2 INJECTION INTRAMUSCULAR; INTRAVENOUS at 02:16

## 2021-02-25 RX ADMIN — WATER 10 ML: 1 INJECTION INTRAMUSCULAR; INTRAVENOUS; SUBCUTANEOUS at 12:04

## 2021-02-25 RX ADMIN — MULTIPLE VITAMINS W/ MINERALS TAB 1 TABLET: TAB at 08:07

## 2021-02-25 RX ADMIN — LORAZEPAM 4 MG: 2 INJECTION INTRAMUSCULAR; INTRAVENOUS at 08:45

## 2021-02-25 RX ADMIN — Medication 10 MG: at 20:13

## 2021-02-25 RX ADMIN — DEXMEDETOMIDINE HYDROCHLORIDE 1.5 MCG/KG/HR: 400 INJECTION, SOLUTION INTRAVENOUS at 04:02

## 2021-02-25 RX ADMIN — LEVOTHYROXINE SODIUM 50 MCG: 20 INJECTION, SOLUTION INTRAVENOUS at 10:07

## 2021-02-25 RX ADMIN — ACETAMINOPHEN 650 MG: 160 SOLUTION ORAL at 06:31

## 2021-02-25 RX ADMIN — WATER 10 ML: 1 INJECTION INTRAMUSCULAR; INTRAVENOUS; SUBCUTANEOUS at 17:52

## 2021-02-25 RX ADMIN — THIAMINE HCL TAB 100 MG 100 MG: 100 TAB at 12:15

## 2021-02-25 RX ADMIN — TAZOBACTAM SODIUM AND PIPERACILLIN SODIUM 3.38 G: 375; 3 INJECTION, SOLUTION INTRAVENOUS at 12:15

## 2021-02-25 RX ADMIN — THIAMINE HYDROCHLORIDE 500 MG: 100 INJECTION, SOLUTION INTRAMUSCULAR; INTRAVENOUS at 04:58

## 2021-02-25 RX ADMIN — LORAZEPAM 4 MG: 2 INJECTION INTRAMUSCULAR; INTRAVENOUS at 10:06

## 2021-02-25 RX ADMIN — DEXMEDETOMIDINE HYDROCHLORIDE 1.5 MCG/KG/HR: 400 INJECTION, SOLUTION INTRAVENOUS at 23:32

## 2021-02-25 RX ADMIN — DEXMEDETOMIDINE HYDROCHLORIDE 1.5 MCG/KG/HR: 400 INJECTION, SOLUTION INTRAVENOUS at 12:05

## 2021-02-25 RX ADMIN — SODIUM CHLORIDE, PRESERVATIVE FREE 10 ML: 5 INJECTION INTRAVENOUS at 20:14

## 2021-02-25 RX ADMIN — DIAZEPAM 30 MG: 5 TABLET ORAL at 17:50

## 2021-02-25 RX ADMIN — INSULIN HUMAN 2 UNITS: 100 INJECTION, SOLUTION PARENTERAL at 23:35

## 2021-02-25 RX ADMIN — DEXMEDETOMIDINE HYDROCHLORIDE 1.5 MCG/KG/HR: 400 INJECTION, SOLUTION INTRAVENOUS at 06:32

## 2021-02-25 RX ADMIN — DEXMEDETOMIDINE HYDROCHLORIDE 1.5 MCG/KG/HR: 400 INJECTION, SOLUTION INTRAVENOUS at 20:39

## 2021-02-25 RX ADMIN — ZIPRASIDONE MESYLATE 20 MG: 20 INJECTION, POWDER, LYOPHILIZED, FOR SOLUTION INTRAMUSCULAR at 04:51

## 2021-02-25 RX ADMIN — INSULIN HUMAN 2 UNITS: 100 INJECTION, SOLUTION PARENTERAL at 17:50

## 2021-02-26 PROBLEM — F10.939 ALCOHOL WITHDRAWAL (HCC): Status: ACTIVE | Noted: 2021-02-26

## 2021-02-26 LAB
ALBUMIN SERPL-MCNC: 3.1 G/DL (ref 3.5–5.2)
ALBUMIN/GLOB SERPL: 1 G/DL
ALP SERPL-CCNC: 148 U/L (ref 39–117)
ALT SERPL W P-5'-P-CCNC: 36 U/L (ref 1–33)
ANION GAP SERPL CALCULATED.3IONS-SCNC: 7 MMOL/L (ref 5–15)
AST SERPL-CCNC: 49 U/L (ref 1–32)
BACTERIA UR QL AUTO: ABNORMAL /HPF
BASOPHILS # BLD AUTO: 0.03 10*3/MM3 (ref 0–0.2)
BASOPHILS NFR BLD AUTO: 0.5 % (ref 0–1.5)
BILIRUB SERPL-MCNC: 0.2 MG/DL (ref 0–1.2)
BILIRUB UR QL STRIP: NEGATIVE
BUN SERPL-MCNC: 12 MG/DL (ref 6–20)
BUN/CREAT SERPL: 18.2 (ref 7–25)
CALCIUM SPEC-SCNC: 9.1 MG/DL (ref 8.6–10.5)
CHLORIDE SERPL-SCNC: 117 MMOL/L (ref 98–107)
CLARITY UR: CLEAR
CO2 SERPL-SCNC: 23 MMOL/L (ref 22–29)
COLOR UR: YELLOW
CREAT SERPL-MCNC: 0.66 MG/DL (ref 0.57–1)
DEPRECATED RDW RBC AUTO: 66.6 FL (ref 37–54)
EOSINOPHIL # BLD AUTO: 0.17 10*3/MM3 (ref 0–0.4)
EOSINOPHIL NFR BLD AUTO: 2.9 % (ref 0.3–6.2)
ERYTHROCYTE [DISTWIDTH] IN BLOOD BY AUTOMATED COUNT: 22.8 % (ref 12.3–15.4)
GFR SERPL CREATININE-BSD FRML MDRD: 96 ML/MIN/1.73
GLOBULIN UR ELPH-MCNC: 3.1 GM/DL
GLUCOSE BLDC GLUCOMTR-MCNC: 137 MG/DL (ref 70–130)
GLUCOSE BLDC GLUCOMTR-MCNC: 140 MG/DL (ref 70–130)
GLUCOSE BLDC GLUCOMTR-MCNC: 152 MG/DL (ref 70–130)
GLUCOSE BLDC GLUCOMTR-MCNC: 157 MG/DL (ref 70–130)
GLUCOSE SERPL-MCNC: 164 MG/DL (ref 65–99)
GLUCOSE UR STRIP-MCNC: NEGATIVE MG/DL
HCT VFR BLD AUTO: 33.9 % (ref 34–46.6)
HGB BLD-MCNC: 9.5 G/DL (ref 12–15.9)
HGB UR QL STRIP.AUTO: ABNORMAL
HYALINE CASTS UR QL AUTO: ABNORMAL /LPF
IMM GRANULOCYTES # BLD AUTO: 0.17 10*3/MM3 (ref 0–0.05)
IMM GRANULOCYTES NFR BLD AUTO: 2.9 % (ref 0–0.5)
KETONES UR QL STRIP: NEGATIVE
LEUKOCYTE ESTERASE UR QL STRIP.AUTO: NEGATIVE
LYMPHOCYTES # BLD AUTO: 1.38 10*3/MM3 (ref 0.7–3.1)
LYMPHOCYTES NFR BLD AUTO: 23.3 % (ref 19.6–45.3)
MAGNESIUM SERPL-MCNC: 2.1 MG/DL (ref 1.6–2.6)
MCH RBC QN AUTO: 24.1 PG (ref 26.6–33)
MCHC RBC AUTO-ENTMCNC: 28 G/DL (ref 31.5–35.7)
MCV RBC AUTO: 85.8 FL (ref 79–97)
MONOCYTES # BLD AUTO: 0.37 10*3/MM3 (ref 0.1–0.9)
MONOCYTES NFR BLD AUTO: 6.3 % (ref 5–12)
NEUTROPHILS NFR BLD AUTO: 3.8 10*3/MM3 (ref 1.7–7)
NEUTROPHILS NFR BLD AUTO: 64.1 % (ref 42.7–76)
NITRITE UR QL STRIP: NEGATIVE
NRBC BLD AUTO-RTO: 3.9 /100 WBC (ref 0–0.2)
PH UR STRIP.AUTO: 8 [PH] (ref 5–8)
PHOSPHATE SERPL-MCNC: 2.3 MG/DL (ref 2.5–4.5)
PLATELET # BLD AUTO: 163 10*3/MM3 (ref 140–450)
PMV BLD AUTO: 9.8 FL (ref 6–12)
POTASSIUM SERPL-SCNC: 3.9 MMOL/L (ref 3.5–5.2)
PROCALCITONIN SERPL-MCNC: 0.2 NG/ML (ref 0–0.25)
PROT SERPL-MCNC: 6.2 G/DL (ref 6–8.5)
PROT UR QL STRIP: NEGATIVE
RBC # BLD AUTO: 3.95 10*6/MM3 (ref 3.77–5.28)
RBC # UR: ABNORMAL /HPF
REF LAB TEST METHOD: ABNORMAL
SODIUM SERPL-SCNC: 147 MMOL/L (ref 136–145)
SP GR UR STRIP: 1.01 (ref 1–1.03)
SQUAMOUS #/AREA URNS HPF: ABNORMAL /HPF
UROBILINOGEN UR QL STRIP: ABNORMAL
WBC # BLD AUTO: 5.92 10*3/MM3 (ref 3.4–10.8)
WBC UR QL AUTO: ABNORMAL /HPF

## 2021-02-26 PROCEDURE — 84145 PROCALCITONIN (PCT): CPT | Performed by: INTERNAL MEDICINE

## 2021-02-26 PROCEDURE — 81001 URINALYSIS AUTO W/SCOPE: CPT | Performed by: INTERNAL MEDICINE

## 2021-02-26 PROCEDURE — 99291 CRITICAL CARE FIRST HOUR: CPT | Performed by: INTERNAL MEDICINE

## 2021-02-26 PROCEDURE — 87086 URINE CULTURE/COLONY COUNT: CPT | Performed by: INTERNAL MEDICINE

## 2021-02-26 PROCEDURE — 63710000001 INSULIN REGULAR HUMAN PER 5 UNITS: Performed by: INTERNAL MEDICINE

## 2021-02-26 PROCEDURE — 25010000002 ENOXAPARIN PER 10 MG: Performed by: NURSE PRACTITIONER

## 2021-02-26 PROCEDURE — 25010000002 PIPERACILLIN SOD-TAZOBACTAM PER 1 G: Performed by: INTERNAL MEDICINE

## 2021-02-26 PROCEDURE — 25010000002 LORAZEPAM PER 2 MG: Performed by: INTERNAL MEDICINE

## 2021-02-26 PROCEDURE — 25810000003 DEXTROSE 5 % WITH KCL 20 MEQ 20-5 MEQ/L-% SOLUTION: Performed by: INTERNAL MEDICINE

## 2021-02-26 PROCEDURE — 83735 ASSAY OF MAGNESIUM: CPT | Performed by: INTERNAL MEDICINE

## 2021-02-26 PROCEDURE — 80053 COMPREHEN METABOLIC PANEL: CPT | Performed by: INTERNAL MEDICINE

## 2021-02-26 PROCEDURE — 84100 ASSAY OF PHOSPHORUS: CPT | Performed by: INTERNAL MEDICINE

## 2021-02-26 PROCEDURE — 85025 COMPLETE CBC W/AUTO DIFF WBC: CPT | Performed by: INTERNAL MEDICINE

## 2021-02-26 PROCEDURE — 82962 GLUCOSE BLOOD TEST: CPT

## 2021-02-26 PROCEDURE — 25010000002 ZIPRASIDONE MESYLATE PER 10 MG: Performed by: INTERNAL MEDICINE

## 2021-02-26 RX ORDER — POTASSIUM CHLORIDE, DEXTROSE MONOHYDRATE 150; 5 MG/100ML; G/100ML
100 INJECTION, SOLUTION INTRAVENOUS CONTINUOUS
Status: ACTIVE | OUTPATIENT
Start: 2021-02-26 | End: 2021-02-27

## 2021-02-26 RX ORDER — WATER 1000 ML/1000ML
INJECTION, SOLUTION INTRAVENOUS
Status: COMPLETED
Start: 2021-02-26 | End: 2021-02-26

## 2021-02-26 RX ADMIN — THIAMINE HCL TAB 100 MG 100 MG: 100 TAB at 08:33

## 2021-02-26 RX ADMIN — QUETIAPINE FUMARATE 200 MG: 100 TABLET ORAL at 08:33

## 2021-02-26 RX ADMIN — DEXMEDETOMIDINE HYDROCHLORIDE 1.5 MCG/KG/HR: 400 INJECTION, SOLUTION INTRAVENOUS at 05:15

## 2021-02-26 RX ADMIN — ZIPRASIDONE MESYLATE 20 MG: 20 INJECTION, POWDER, LYOPHILIZED, FOR SOLUTION INTRAMUSCULAR at 19:19

## 2021-02-26 RX ADMIN — DIAZEPAM 30 MG: 5 TABLET ORAL at 23:09

## 2021-02-26 RX ADMIN — POTASSIUM CHLORIDE AND DEXTROSE MONOHYDRATE 150 ML/HR: 150; 5 INJECTION, SOLUTION INTRAVENOUS at 02:19

## 2021-02-26 RX ADMIN — DEXMEDETOMIDINE HYDROCHLORIDE 1.5 MCG/KG/HR: 400 INJECTION, SOLUTION INTRAVENOUS at 08:30

## 2021-02-26 RX ADMIN — LORAZEPAM 2 MG: 2 INJECTION INTRAMUSCULAR; INTRAVENOUS at 17:54

## 2021-02-26 RX ADMIN — ZIPRASIDONE MESYLATE 20 MG: 20 INJECTION, POWDER, LYOPHILIZED, FOR SOLUTION INTRAMUSCULAR at 12:30

## 2021-02-26 RX ADMIN — DOCUSATE SODIUM 100 MG: 50 LIQUID ORAL at 20:04

## 2021-02-26 RX ADMIN — Medication 20 MG: at 20:28

## 2021-02-26 RX ADMIN — QUETIAPINE FUMARATE 200 MG: 100 TABLET ORAL at 20:04

## 2021-02-26 RX ADMIN — TAZOBACTAM SODIUM AND PIPERACILLIN SODIUM 3.38 G: 375; 3 INJECTION, SOLUTION INTRAVENOUS at 19:49

## 2021-02-26 RX ADMIN — LANSOPRAZOLE 30 MG: KIT at 18:16

## 2021-02-26 RX ADMIN — LORAZEPAM 4 MG: 2 INJECTION INTRAMUSCULAR; INTRAVENOUS at 19:49

## 2021-02-26 RX ADMIN — LEVOTHYROXINE SODIUM 88 MCG: 88 TABLET ORAL at 05:15

## 2021-02-26 RX ADMIN — INSULIN HUMAN 2 UNITS: 100 INJECTION, SOLUTION PARENTERAL at 23:45

## 2021-02-26 RX ADMIN — DIAZEPAM 30 MG: 5 TABLET ORAL at 05:15

## 2021-02-26 RX ADMIN — SENNOSIDES 10 ML: 8.8 LIQUID ORAL at 20:04

## 2021-02-26 RX ADMIN — LORAZEPAM 4 MG: 2 INJECTION INTRAMUSCULAR; INTRAVENOUS at 14:32

## 2021-02-26 RX ADMIN — LORAZEPAM 4 MG: 2 INJECTION INTRAMUSCULAR; INTRAVENOUS at 23:09

## 2021-02-26 RX ADMIN — DEXMEDETOMIDINE HYDROCHLORIDE 1.5 MCG/KG/HR: 4 INJECTION, SOLUTION INTRAVENOUS at 12:23

## 2021-02-26 RX ADMIN — ZIPRASIDONE MESYLATE 20 MG: 20 INJECTION, POWDER, LYOPHILIZED, FOR SOLUTION INTRAMUSCULAR at 06:06

## 2021-02-26 RX ADMIN — LANSOPRAZOLE 30 MG: KIT at 10:58

## 2021-02-26 RX ADMIN — DIAZEPAM 30 MG: 5 TABLET ORAL at 11:02

## 2021-02-26 RX ADMIN — INSULIN HUMAN 2 UNITS: 100 INJECTION, SOLUTION PARENTERAL at 05:38

## 2021-02-26 RX ADMIN — WATER 10 ML: 1 INJECTION INTRAMUSCULAR; INTRAVENOUS; SUBCUTANEOUS at 19:24

## 2021-02-26 RX ADMIN — SODIUM CHLORIDE, PRESERVATIVE FREE 10 ML: 5 INJECTION INTRAVENOUS at 20:05

## 2021-02-26 RX ADMIN — DEXMEDETOMIDINE HYDROCHLORIDE 1.5 MCG/KG/HR: 400 INJECTION, SOLUTION INTRAVENOUS at 02:19

## 2021-02-26 RX ADMIN — Medication 10 MG: at 20:04

## 2021-02-26 RX ADMIN — POTASSIUM CHLORIDE AND DEXTROSE MONOHYDRATE 150 ML/HR: 150; 5 INJECTION, SOLUTION INTRAVENOUS at 08:47

## 2021-02-26 RX ADMIN — TAZOBACTAM SODIUM AND PIPERACILLIN SODIUM 3.38 G: 375; 3 INJECTION, SOLUTION INTRAVENOUS at 11:02

## 2021-02-26 RX ADMIN — LORAZEPAM 4 MG: 2 INJECTION INTRAMUSCULAR; INTRAVENOUS at 21:58

## 2021-02-26 RX ADMIN — FOLIC ACID 1 MG: 1 TABLET ORAL at 08:33

## 2021-02-26 RX ADMIN — WATER 1.2 ML: 1 INJECTION INTRAMUSCULAR; INTRAVENOUS; SUBCUTANEOUS at 12:38

## 2021-02-26 RX ADMIN — DIAZEPAM 30 MG: 5 TABLET ORAL at 17:54

## 2021-02-26 RX ADMIN — ENOXAPARIN SODIUM 40 MG: 40 INJECTION SUBCUTANEOUS at 08:33

## 2021-02-26 RX ADMIN — LORAZEPAM 2 MG: 2 INJECTION INTRAMUSCULAR; INTRAVENOUS at 12:04

## 2021-02-26 RX ADMIN — ACETAMINOPHEN 649.6 MG: 160 SOLUTION ORAL at 18:28

## 2021-02-26 RX ADMIN — Medication 1 TABLET: at 08:37

## 2021-02-26 RX ADMIN — DEXMEDETOMIDINE HYDROCHLORIDE 1.5 MCG/KG/HR: 4 INJECTION, SOLUTION INTRAVENOUS at 18:22

## 2021-02-26 RX ADMIN — TAZOBACTAM SODIUM AND PIPERACILLIN SODIUM 3.38 G: 375; 3 INJECTION, SOLUTION INTRAVENOUS at 05:14

## 2021-02-26 RX ADMIN — LORAZEPAM 2 MG: 2 INJECTION INTRAMUSCULAR; INTRAVENOUS at 13:14

## 2021-02-26 RX ADMIN — POTASSIUM CHLORIDE AND DEXTROSE MONOHYDRATE 100 ML/HR: 150; 5 INJECTION, SOLUTION INTRAVENOUS at 18:22

## 2021-02-26 RX ADMIN — LORAZEPAM 2 MG: 2 INJECTION INTRAMUSCULAR; INTRAVENOUS at 16:57

## 2021-02-27 LAB
ALBUMIN SERPL-MCNC: 3.3 G/DL (ref 3.5–5.2)
ALBUMIN/GLOB SERPL: 1.1 G/DL
ALP SERPL-CCNC: 129 U/L (ref 39–117)
ALT SERPL W P-5'-P-CCNC: 37 U/L (ref 1–33)
ANION GAP SERPL CALCULATED.3IONS-SCNC: 10 MMOL/L (ref 5–15)
AST SERPL-CCNC: 39 U/L (ref 1–32)
BACTERIA SPEC AEROBE CULT: NO GROWTH
BASOPHILS # BLD AUTO: 0.02 10*3/MM3 (ref 0–0.2)
BASOPHILS NFR BLD AUTO: 0.3 % (ref 0–1.5)
BILIRUB SERPL-MCNC: 0.2 MG/DL (ref 0–1.2)
BUN SERPL-MCNC: 15 MG/DL (ref 6–20)
BUN/CREAT SERPL: 19.5 (ref 7–25)
CALCIUM SPEC-SCNC: 9 MG/DL (ref 8.6–10.5)
CHLORIDE SERPL-SCNC: 111 MMOL/L (ref 98–107)
CO2 SERPL-SCNC: 23 MMOL/L (ref 22–29)
CREAT SERPL-MCNC: 0.77 MG/DL (ref 0.57–1)
DEPRECATED RDW RBC AUTO: 69.1 FL (ref 37–54)
EOSINOPHIL # BLD AUTO: 0.18 10*3/MM3 (ref 0–0.4)
EOSINOPHIL NFR BLD AUTO: 2.7 % (ref 0.3–6.2)
ERYTHROCYTE [DISTWIDTH] IN BLOOD BY AUTOMATED COUNT: 23.8 % (ref 12.3–15.4)
GFR SERPL CREATININE-BSD FRML MDRD: 80 ML/MIN/1.73
GLOBULIN UR ELPH-MCNC: 3.1 GM/DL
GLUCOSE BLDC GLUCOMTR-MCNC: 125 MG/DL (ref 70–130)
GLUCOSE BLDC GLUCOMTR-MCNC: 144 MG/DL (ref 70–130)
GLUCOSE BLDC GLUCOMTR-MCNC: 148 MG/DL (ref 70–130)
GLUCOSE BLDC GLUCOMTR-MCNC: 149 MG/DL (ref 70–130)
GLUCOSE SERPL-MCNC: 133 MG/DL (ref 65–99)
HCT VFR BLD AUTO: 34.3 % (ref 34–46.6)
HGB BLD-MCNC: 9.7 G/DL (ref 12–15.9)
IMM GRANULOCYTES # BLD AUTO: 0.2 10*3/MM3 (ref 0–0.05)
IMM GRANULOCYTES NFR BLD AUTO: 3 % (ref 0–0.5)
LYMPHOCYTES # BLD AUTO: 1.71 10*3/MM3 (ref 0.7–3.1)
LYMPHOCYTES NFR BLD AUTO: 25.3 % (ref 19.6–45.3)
MAGNESIUM SERPL-MCNC: 2 MG/DL (ref 1.6–2.6)
MCH RBC QN AUTO: 24.7 PG (ref 26.6–33)
MCHC RBC AUTO-ENTMCNC: 28.3 G/DL (ref 31.5–35.7)
MCV RBC AUTO: 87.5 FL (ref 79–97)
MONOCYTES # BLD AUTO: 0.45 10*3/MM3 (ref 0.1–0.9)
MONOCYTES NFR BLD AUTO: 6.6 % (ref 5–12)
NEUTROPHILS NFR BLD AUTO: 4.21 10*3/MM3 (ref 1.7–7)
NEUTROPHILS NFR BLD AUTO: 62.1 % (ref 42.7–76)
NRBC BLD AUTO-RTO: 1.3 /100 WBC (ref 0–0.2)
PHOSPHATE SERPL-MCNC: 3.6 MG/DL (ref 2.5–4.5)
PLATELET # BLD AUTO: 172 10*3/MM3 (ref 140–450)
PMV BLD AUTO: 10.4 FL (ref 6–12)
POTASSIUM SERPL-SCNC: 3.9 MMOL/L (ref 3.5–5.2)
PROT SERPL-MCNC: 6.4 G/DL (ref 6–8.5)
RBC # BLD AUTO: 3.92 10*6/MM3 (ref 3.77–5.28)
SODIUM SERPL-SCNC: 142 MMOL/L (ref 136–145)
SODIUM SERPL-SCNC: 144 MMOL/L (ref 136–145)
WBC # BLD AUTO: 6.77 10*3/MM3 (ref 3.4–10.8)

## 2021-02-27 PROCEDURE — 82962 GLUCOSE BLOOD TEST: CPT

## 2021-02-27 PROCEDURE — 25010000002 LORAZEPAM PER 2 MG: Performed by: INTERNAL MEDICINE

## 2021-02-27 PROCEDURE — 25010000002 ENOXAPARIN PER 10 MG: Performed by: NURSE PRACTITIONER

## 2021-02-27 PROCEDURE — 84100 ASSAY OF PHOSPHORUS: CPT | Performed by: INTERNAL MEDICINE

## 2021-02-27 PROCEDURE — 99233 SBSQ HOSP IP/OBS HIGH 50: CPT | Performed by: INTERNAL MEDICINE

## 2021-02-27 PROCEDURE — 25010000002 PIPERACILLIN SOD-TAZOBACTAM PER 1 G: Performed by: INTERNAL MEDICINE

## 2021-02-27 PROCEDURE — 83735 ASSAY OF MAGNESIUM: CPT | Performed by: INTERNAL MEDICINE

## 2021-02-27 PROCEDURE — 25010000002 DESMOPRESSIN PER 1 MCG: Performed by: INTERNAL MEDICINE

## 2021-02-27 PROCEDURE — 25810000003 DEXTROSE 5 % WITH KCL 20 MEQ 20-5 MEQ/L-% SOLUTION: Performed by: INTERNAL MEDICINE

## 2021-02-27 PROCEDURE — 25010000002 ZIPRASIDONE MESYLATE PER 10 MG: Performed by: INTERNAL MEDICINE

## 2021-02-27 PROCEDURE — 84295 ASSAY OF SERUM SODIUM: CPT | Performed by: INTERNAL MEDICINE

## 2021-02-27 PROCEDURE — 85025 COMPLETE CBC W/AUTO DIFF WBC: CPT | Performed by: INTERNAL MEDICINE

## 2021-02-27 PROCEDURE — 80053 COMPREHEN METABOLIC PANEL: CPT | Performed by: INTERNAL MEDICINE

## 2021-02-27 RX ORDER — DESMOPRESSIN ACETATE 4 UG/ML
0.5 INJECTION, SOLUTION INTRAVENOUS; SUBCUTANEOUS ONCE
Status: COMPLETED | OUTPATIENT
Start: 2021-02-27 | End: 2021-02-27

## 2021-02-27 RX ORDER — WATER 1000 ML/1000ML
INJECTION, SOLUTION INTRAVENOUS
Status: COMPLETED
Start: 2021-02-27 | End: 2021-02-27

## 2021-02-27 RX ORDER — HYDROCHLOROTHIAZIDE 25 MG/1
25 TABLET ORAL DAILY
Status: DISCONTINUED | OUTPATIENT
Start: 2021-02-27 | End: 2021-02-28

## 2021-02-27 RX ORDER — POTASSIUM CHLORIDE, DEXTROSE MONOHYDRATE 150; 5 MG/100ML; G/100ML
50 INJECTION, SOLUTION INTRAVENOUS CONTINUOUS
Status: DISCONTINUED | OUTPATIENT
Start: 2021-02-27 | End: 2021-02-28

## 2021-02-27 RX ADMIN — ENOXAPARIN SODIUM 40 MG: 40 INJECTION SUBCUTANEOUS at 09:06

## 2021-02-27 RX ADMIN — POTASSIUM CHLORIDE AND DEXTROSE MONOHYDRATE 50 ML/HR: 150; 5 INJECTION, SOLUTION INTRAVENOUS at 17:26

## 2021-02-27 RX ADMIN — DEXMEDETOMIDINE HYDROCHLORIDE 1 MCG/KG/HR: 4 INJECTION, SOLUTION INTRAVENOUS at 11:09

## 2021-02-27 RX ADMIN — LORAZEPAM 4 MG: 2 INJECTION INTRAMUSCULAR; INTRAVENOUS at 20:56

## 2021-02-27 RX ADMIN — SENNOSIDES 10 ML: 8.8 LIQUID ORAL at 20:16

## 2021-02-27 RX ADMIN — HYDROCHLOROTHIAZIDE 25 MG: 25 TABLET ORAL at 11:09

## 2021-02-27 RX ADMIN — DEXMEDETOMIDINE HYDROCHLORIDE 1.5 MCG/KG/HR: 4 INJECTION, SOLUTION INTRAVENOUS at 02:41

## 2021-02-27 RX ADMIN — DEXMEDETOMIDINE HYDROCHLORIDE 1.2 MCG/KG/HR: 4 INJECTION, SOLUTION INTRAVENOUS at 23:28

## 2021-02-27 RX ADMIN — FOLIC ACID 1 MG: 1 TABLET ORAL at 09:08

## 2021-02-27 RX ADMIN — DOCUSATE SODIUM 100 MG: 50 LIQUID ORAL at 20:16

## 2021-02-27 RX ADMIN — DOCUSATE SODIUM 100 MG: 50 LIQUID ORAL at 09:07

## 2021-02-27 RX ADMIN — DIAZEPAM 30 MG: 5 TABLET ORAL at 05:13

## 2021-02-27 RX ADMIN — LORAZEPAM 2 MG: 2 INJECTION INTRAMUSCULAR; INTRAVENOUS at 19:54

## 2021-02-27 RX ADMIN — THIAMINE HCL TAB 100 MG 100 MG: 100 TAB at 09:07

## 2021-02-27 RX ADMIN — LORAZEPAM 4 MG: 2 INJECTION INTRAMUSCULAR; INTRAVENOUS at 18:13

## 2021-02-27 RX ADMIN — Medication 10 MG: at 20:16

## 2021-02-27 RX ADMIN — SODIUM CHLORIDE, PRESERVATIVE FREE 10 ML: 5 INJECTION INTRAVENOUS at 20:16

## 2021-02-27 RX ADMIN — QUETIAPINE FUMARATE 200 MG: 100 TABLET ORAL at 09:07

## 2021-02-27 RX ADMIN — DIAZEPAM 30 MG: 5 TABLET ORAL at 17:18

## 2021-02-27 RX ADMIN — SODIUM CHLORIDE, PRESERVATIVE FREE 10 ML: 5 INJECTION INTRAVENOUS at 09:08

## 2021-02-27 RX ADMIN — WATER 1.2 ML: 1 INJECTION INTRAMUSCULAR; INTRAVENOUS; SUBCUTANEOUS at 12:22

## 2021-02-27 RX ADMIN — POTASSIUM CHLORIDE AND DEXTROSE MONOHYDRATE 50 ML/HR: 150; 5 INJECTION, SOLUTION INTRAVENOUS at 11:11

## 2021-02-27 RX ADMIN — LORAZEPAM 2 MG: 2 INJECTION INTRAMUSCULAR; INTRAVENOUS at 05:13

## 2021-02-27 RX ADMIN — TAZOBACTAM SODIUM AND PIPERACILLIN SODIUM 3.38 G: 375; 3 INJECTION, SOLUTION INTRAVENOUS at 23:27

## 2021-02-27 RX ADMIN — ZIPRASIDONE MESYLATE 20 MG: 20 INJECTION, POWDER, LYOPHILIZED, FOR SOLUTION INTRAMUSCULAR at 12:15

## 2021-02-27 RX ADMIN — DESMOPRESSIN ACETATE 0.52 MCG: 4 SOLUTION INTRAVENOUS at 13:55

## 2021-02-27 RX ADMIN — ZIPRASIDONE MESYLATE 20 MG: 20 INJECTION, POWDER, LYOPHILIZED, FOR SOLUTION INTRAMUSCULAR at 01:31

## 2021-02-27 RX ADMIN — POTASSIUM CHLORIDE AND DEXTROSE MONOHYDRATE 100 ML/HR: 150; 5 INJECTION, SOLUTION INTRAVENOUS at 05:14

## 2021-02-27 RX ADMIN — LEVOTHYROXINE SODIUM 88 MCG: 88 TABLET ORAL at 05:13

## 2021-02-27 RX ADMIN — Medication 1 TABLET: at 09:07

## 2021-02-27 RX ADMIN — DIAZEPAM 30 MG: 5 TABLET ORAL at 11:10

## 2021-02-27 RX ADMIN — SENNOSIDES 10 ML: 8.8 LIQUID ORAL at 09:07

## 2021-02-27 RX ADMIN — QUETIAPINE FUMARATE 200 MG: 100 TABLET ORAL at 20:16

## 2021-02-27 RX ADMIN — LORAZEPAM 4 MG: 2 INJECTION INTRAMUSCULAR; INTRAVENOUS at 11:31

## 2021-02-27 RX ADMIN — LANSOPRAZOLE 30 MG: KIT at 17:22

## 2021-02-27 RX ADMIN — DIAZEPAM 30 MG: 5 TABLET ORAL at 23:28

## 2021-02-27 RX ADMIN — ACETAMINOPHEN 650 MG: 160 SOLUTION ORAL at 21:02

## 2021-02-27 RX ADMIN — TAZOBACTAM SODIUM AND PIPERACILLIN SODIUM 3.38 G: 375; 3 INJECTION, SOLUTION INTRAVENOUS at 11:09

## 2021-02-27 RX ADMIN — TAZOBACTAM SODIUM AND PIPERACILLIN SODIUM 3.38 G: 375; 3 INJECTION, SOLUTION INTRAVENOUS at 03:35

## 2021-02-28 LAB
ALBUMIN SERPL-MCNC: 3.5 G/DL (ref 3.5–5.2)
ALBUMIN/GLOB SERPL: 1 G/DL
ALP SERPL-CCNC: 124 U/L (ref 39–117)
ALT SERPL W P-5'-P-CCNC: 44 U/L (ref 1–33)
ANION GAP SERPL CALCULATED.3IONS-SCNC: 13 MMOL/L (ref 5–15)
AST SERPL-CCNC: 42 U/L (ref 1–32)
BASOPHILS # BLD AUTO: 0.02 10*3/MM3 (ref 0–0.2)
BASOPHILS NFR BLD AUTO: 0.4 % (ref 0–1.5)
BILIRUB SERPL-MCNC: 0.3 MG/DL (ref 0–1.2)
BUN SERPL-MCNC: 16 MG/DL (ref 6–20)
BUN/CREAT SERPL: 21.1 (ref 7–25)
CALCIUM SPEC-SCNC: 9.2 MG/DL (ref 8.6–10.5)
CHLORIDE SERPL-SCNC: 103 MMOL/L (ref 98–107)
CO2 SERPL-SCNC: 21 MMOL/L (ref 22–29)
CREAT SERPL-MCNC: 0.76 MG/DL (ref 0.57–1)
DEPRECATED RDW RBC AUTO: 70.3 FL (ref 37–54)
EOSINOPHIL # BLD AUTO: 0.15 10*3/MM3 (ref 0–0.4)
EOSINOPHIL NFR BLD AUTO: 2.7 % (ref 0.3–6.2)
ERYTHROCYTE [DISTWIDTH] IN BLOOD BY AUTOMATED COUNT: 24 % (ref 12.3–15.4)
GFR SERPL CREATININE-BSD FRML MDRD: 82 ML/MIN/1.73
GLOBULIN UR ELPH-MCNC: 3.4 GM/DL
GLUCOSE BLDC GLUCOMTR-MCNC: 125 MG/DL (ref 70–130)
GLUCOSE BLDC GLUCOMTR-MCNC: 142 MG/DL (ref 70–130)
GLUCOSE BLDC GLUCOMTR-MCNC: 160 MG/DL (ref 70–130)
GLUCOSE BLDC GLUCOMTR-MCNC: 161 MG/DL (ref 70–130)
GLUCOSE SERPL-MCNC: 173 MG/DL (ref 65–99)
HCT VFR BLD AUTO: 36.8 % (ref 34–46.6)
HGB BLD-MCNC: 10.3 G/DL (ref 12–15.9)
IMM GRANULOCYTES # BLD AUTO: 0.16 10*3/MM3 (ref 0–0.05)
IMM GRANULOCYTES NFR BLD AUTO: 2.9 % (ref 0–0.5)
LITHIUM SERPL-SCNC: <0.1 MMOL/L (ref 0.6–1.2)
LYMPHOCYTES # BLD AUTO: 0.92 10*3/MM3 (ref 0.7–3.1)
LYMPHOCYTES NFR BLD AUTO: 16.7 % (ref 19.6–45.3)
MAGNESIUM SERPL-MCNC: 2 MG/DL (ref 1.6–2.6)
MCH RBC QN AUTO: 24.5 PG (ref 26.6–33)
MCHC RBC AUTO-ENTMCNC: 28 G/DL (ref 31.5–35.7)
MCV RBC AUTO: 87.6 FL (ref 79–97)
MONOCYTES # BLD AUTO: 0.43 10*3/MM3 (ref 0.1–0.9)
MONOCYTES NFR BLD AUTO: 7.8 % (ref 5–12)
NEUTROPHILS NFR BLD AUTO: 3.83 10*3/MM3 (ref 1.7–7)
NEUTROPHILS NFR BLD AUTO: 69.5 % (ref 42.7–76)
NRBC BLD AUTO-RTO: 0.7 /100 WBC (ref 0–0.2)
OSMOLALITY SERPL: 293 MOSM/KG (ref 275–295)
OSMOLALITY UR: 665 MOSM/KG (ref 300–1100)
PHOSPHATE SERPL-MCNC: 4.4 MG/DL (ref 2.5–4.5)
PLATELET # BLD AUTO: 192 10*3/MM3 (ref 140–450)
PMV BLD AUTO: 10.8 FL (ref 6–12)
POTASSIUM SERPL-SCNC: 3.5 MMOL/L (ref 3.5–5.2)
POTASSIUM SERPL-SCNC: 5.9 MMOL/L (ref 3.5–5.2)
PROT SERPL-MCNC: 6.9 G/DL (ref 6–8.5)
RBC # BLD AUTO: 4.2 10*6/MM3 (ref 3.77–5.28)
SODIUM SERPL-SCNC: 137 MMOL/L (ref 136–145)
SODIUM SERPL-SCNC: 139 MMOL/L (ref 136–145)
SODIUM SERPL-SCNC: 139 MMOL/L (ref 136–145)
SODIUM SERPL-SCNC: 141 MMOL/L (ref 136–145)
WBC # BLD AUTO: 5.51 10*3/MM3 (ref 3.4–10.8)

## 2021-02-28 PROCEDURE — 83930 ASSAY OF BLOOD OSMOLALITY: CPT | Performed by: INTERNAL MEDICINE

## 2021-02-28 PROCEDURE — 80178 ASSAY OF LITHIUM: CPT | Performed by: INTERNAL MEDICINE

## 2021-02-28 PROCEDURE — 84295 ASSAY OF SERUM SODIUM: CPT | Performed by: INTERNAL MEDICINE

## 2021-02-28 PROCEDURE — 84132 ASSAY OF SERUM POTASSIUM: CPT | Performed by: INTERNAL MEDICINE

## 2021-02-28 PROCEDURE — 83735 ASSAY OF MAGNESIUM: CPT | Performed by: INTERNAL MEDICINE

## 2021-02-28 PROCEDURE — 85025 COMPLETE CBC W/AUTO DIFF WBC: CPT | Performed by: INTERNAL MEDICINE

## 2021-02-28 PROCEDURE — 84100 ASSAY OF PHOSPHORUS: CPT | Performed by: INTERNAL MEDICINE

## 2021-02-28 PROCEDURE — 25010000002 ENOXAPARIN PER 10 MG: Performed by: NURSE PRACTITIONER

## 2021-02-28 PROCEDURE — 25010000002 ONDANSETRON PER 1 MG: Performed by: INTERNAL MEDICINE

## 2021-02-28 PROCEDURE — 80053 COMPREHEN METABOLIC PANEL: CPT | Performed by: INTERNAL MEDICINE

## 2021-02-28 PROCEDURE — 25010000002 PIPERACILLIN SOD-TAZOBACTAM PER 1 G: Performed by: INTERNAL MEDICINE

## 2021-02-28 PROCEDURE — 99233 SBSQ HOSP IP/OBS HIGH 50: CPT | Performed by: INTERNAL MEDICINE

## 2021-02-28 PROCEDURE — 82962 GLUCOSE BLOOD TEST: CPT

## 2021-02-28 PROCEDURE — 25010000002 LORAZEPAM PER 2 MG: Performed by: INTERNAL MEDICINE

## 2021-02-28 PROCEDURE — 63710000001 INSULIN REGULAR HUMAN PER 5 UNITS: Performed by: INTERNAL MEDICINE

## 2021-02-28 PROCEDURE — 83935 ASSAY OF URINE OSMOLALITY: CPT | Performed by: INTERNAL MEDICINE

## 2021-02-28 RX ORDER — HYDROCHLOROTHIAZIDE 25 MG/1
25 TABLET ORAL DAILY
Status: DISCONTINUED | OUTPATIENT
Start: 2021-03-01 | End: 2021-03-01

## 2021-02-28 RX ADMIN — QUETIAPINE FUMARATE 200 MG: 100 TABLET ORAL at 08:43

## 2021-02-28 RX ADMIN — ACETAMINOPHEN 650 MG: 160 SOLUTION ORAL at 06:01

## 2021-02-28 RX ADMIN — DOCUSATE SODIUM 100 MG: 50 LIQUID ORAL at 08:42

## 2021-02-28 RX ADMIN — LEVOTHYROXINE SODIUM 88 MCG: 88 TABLET ORAL at 05:40

## 2021-02-28 RX ADMIN — DIAZEPAM 30 MG: 5 TABLET ORAL at 17:10

## 2021-02-28 RX ADMIN — HYDROCHLOROTHIAZIDE 25 MG: 25 TABLET ORAL at 08:43

## 2021-02-28 RX ADMIN — LORAZEPAM 2 MG: 2 INJECTION INTRAMUSCULAR; INTRAVENOUS at 12:49

## 2021-02-28 RX ADMIN — LANSOPRAZOLE 30 MG: KIT at 17:10

## 2021-02-28 RX ADMIN — FOLIC ACID 1 MG: 1 TABLET ORAL at 08:43

## 2021-02-28 RX ADMIN — LANSOPRAZOLE 30 MG: KIT at 08:44

## 2021-02-28 RX ADMIN — ONDANSETRON 4 MG: 2 INJECTION INTRAMUSCULAR; INTRAVENOUS at 13:47

## 2021-02-28 RX ADMIN — TAZOBACTAM SODIUM AND PIPERACILLIN SODIUM 3.38 G: 375; 3 INJECTION, SOLUTION INTRAVENOUS at 15:37

## 2021-02-28 RX ADMIN — THIAMINE HCL TAB 100 MG 100 MG: 100 TAB at 08:42

## 2021-02-28 RX ADMIN — SODIUM CHLORIDE, PRESERVATIVE FREE 10 ML: 5 INJECTION INTRAVENOUS at 12:38

## 2021-02-28 RX ADMIN — ACETAMINOPHEN 650 MG: 160 SOLUTION ORAL at 20:00

## 2021-02-28 RX ADMIN — Medication 1 TABLET: at 08:43

## 2021-02-28 RX ADMIN — DOCUSATE SODIUM 100 MG: 50 LIQUID ORAL at 20:00

## 2021-02-28 RX ADMIN — ENOXAPARIN SODIUM 40 MG: 40 INJECTION SUBCUTANEOUS at 08:43

## 2021-02-28 RX ADMIN — INSULIN HUMAN 2 UNITS: 100 INJECTION, SOLUTION PARENTERAL at 23:56

## 2021-02-28 RX ADMIN — POTASSIUM CHLORIDE 40 MEQ: 1.5 POWDER, FOR SOLUTION ORAL at 08:43

## 2021-02-28 RX ADMIN — POTASSIUM CHLORIDE 40 MEQ: 1.5 POWDER, FOR SOLUTION ORAL at 12:37

## 2021-02-28 RX ADMIN — DIAZEPAM 30 MG: 5 TABLET ORAL at 23:56

## 2021-02-28 RX ADMIN — SENNOSIDES 10 ML: 8.8 LIQUID ORAL at 08:42

## 2021-02-28 RX ADMIN — INSULIN HUMAN 2 UNITS: 100 INJECTION, SOLUTION PARENTERAL at 12:37

## 2021-02-28 RX ADMIN — TAZOBACTAM SODIUM AND PIPERACILLIN SODIUM 3.38 G: 375; 3 INJECTION, SOLUTION INTRAVENOUS at 08:44

## 2021-02-28 RX ADMIN — LORAZEPAM 2 MG: 2 INJECTION INTRAMUSCULAR; INTRAVENOUS at 19:33

## 2021-02-28 RX ADMIN — LORAZEPAM 2 MG: 2 INJECTION INTRAMUSCULAR; INTRAVENOUS at 10:45

## 2021-02-28 RX ADMIN — QUETIAPINE FUMARATE 200 MG: 100 TABLET ORAL at 20:01

## 2021-02-28 RX ADMIN — SENNOSIDES 10 ML: 8.8 LIQUID ORAL at 20:01

## 2021-02-28 RX ADMIN — Medication 10 MG: at 20:01

## 2021-02-28 RX ADMIN — ACETAMINOPHEN 649.6 MG: 160 SOLUTION ORAL at 12:03

## 2021-02-28 RX ADMIN — SODIUM CHLORIDE, PRESERVATIVE FREE 10 ML: 5 INJECTION INTRAVENOUS at 20:01

## 2021-02-28 RX ADMIN — DIAZEPAM 30 MG: 5 TABLET ORAL at 12:03

## 2021-02-28 RX ADMIN — DIAZEPAM 30 MG: 5 TABLET ORAL at 05:40

## 2021-03-01 PROBLEM — N25.1 NEPHROGENIC DIABETES INSIPIDUS (HCC): Status: ACTIVE | Noted: 2021-03-01

## 2021-03-01 LAB
ALBUMIN SERPL-MCNC: 4.2 G/DL (ref 3.5–5.2)
ALBUMIN/GLOB SERPL: 1.1 G/DL
ALP SERPL-CCNC: 137 U/L (ref 39–117)
ALT SERPL W P-5'-P-CCNC: 53 U/L (ref 1–33)
ANION GAP SERPL CALCULATED.3IONS-SCNC: 13 MMOL/L (ref 5–15)
AST SERPL-CCNC: 41 U/L (ref 1–32)
BASOPHILS # BLD AUTO: 0.03 10*3/MM3 (ref 0–0.2)
BASOPHILS NFR BLD AUTO: 0.4 % (ref 0–1.5)
BILIRUB SERPL-MCNC: 0.3 MG/DL (ref 0–1.2)
BUN SERPL-MCNC: 12 MG/DL (ref 6–20)
BUN/CREAT SERPL: 14.8 (ref 7–25)
CALCIUM SPEC-SCNC: 10.2 MG/DL (ref 8.6–10.5)
CHLORIDE SERPL-SCNC: 101 MMOL/L (ref 98–107)
CO2 SERPL-SCNC: 27 MMOL/L (ref 22–29)
CREAT SERPL-MCNC: 0.81 MG/DL (ref 0.57–1)
CRP SERPL-MCNC: 10.96 MG/DL (ref 0–0.5)
DEPRECATED RDW RBC AUTO: 69.2 FL (ref 37–54)
EOSINOPHIL # BLD AUTO: 0.1 10*3/MM3 (ref 0–0.4)
EOSINOPHIL NFR BLD AUTO: 1.3 % (ref 0.3–6.2)
ERYTHROCYTE [DISTWIDTH] IN BLOOD BY AUTOMATED COUNT: 24 % (ref 12.3–15.4)
GFR SERPL CREATININE-BSD FRML MDRD: 76 ML/MIN/1.73
GLOBULIN UR ELPH-MCNC: 3.8 GM/DL
GLUCOSE BLDC GLUCOMTR-MCNC: 145 MG/DL (ref 70–130)
GLUCOSE BLDC GLUCOMTR-MCNC: 153 MG/DL (ref 70–130)
GLUCOSE BLDC GLUCOMTR-MCNC: 169 MG/DL (ref 70–130)
GLUCOSE BLDC GLUCOMTR-MCNC: 173 MG/DL (ref 70–130)
GLUCOSE SERPL-MCNC: 154 MG/DL (ref 65–99)
HCT VFR BLD AUTO: 39.2 % (ref 34–46.6)
HGB BLD-MCNC: 11.4 G/DL (ref 12–15.9)
IMM GRANULOCYTES # BLD AUTO: 0.11 10*3/MM3 (ref 0–0.05)
IMM GRANULOCYTES NFR BLD AUTO: 1.4 % (ref 0–0.5)
LYMPHOCYTES # BLD AUTO: 1.1 10*3/MM3 (ref 0.7–3.1)
LYMPHOCYTES NFR BLD AUTO: 13.9 % (ref 19.6–45.3)
MAGNESIUM SERPL-MCNC: 2.4 MG/DL (ref 1.6–2.6)
MCH RBC QN AUTO: 24.8 PG (ref 26.6–33)
MCHC RBC AUTO-ENTMCNC: 29.1 G/DL (ref 31.5–35.7)
MCV RBC AUTO: 85.4 FL (ref 79–97)
MONOCYTES # BLD AUTO: 0.65 10*3/MM3 (ref 0.1–0.9)
MONOCYTES NFR BLD AUTO: 8.2 % (ref 5–12)
NEUTROPHILS NFR BLD AUTO: 5.91 10*3/MM3 (ref 1.7–7)
NEUTROPHILS NFR BLD AUTO: 74.8 % (ref 42.7–76)
NRBC BLD AUTO-RTO: 0.3 /100 WBC (ref 0–0.2)
PHOSPHATE SERPL-MCNC: 3.4 MG/DL (ref 2.5–4.5)
PLAT MORPH BLD: NORMAL
PLATELET # BLD AUTO: 273 10*3/MM3 (ref 140–450)
PMV BLD AUTO: 10.6 FL (ref 6–12)
POTASSIUM SERPL-SCNC: 3.7 MMOL/L (ref 3.5–5.2)
PREALB SERPL-MCNC: 30 MG/DL (ref 20–40)
PROT SERPL-MCNC: 8 G/DL (ref 6–8.5)
RBC # BLD AUTO: 4.59 10*6/MM3 (ref 3.77–5.28)
RBC MORPH BLD: NORMAL
SODIUM SERPL-SCNC: 139 MMOL/L (ref 136–145)
SODIUM SERPL-SCNC: 139 MMOL/L (ref 136–145)
SODIUM SERPL-SCNC: 141 MMOL/L (ref 136–145)
TRIGL SERPL-MCNC: 251 MG/DL (ref 0–150)
WBC # BLD AUTO: 7.9 10*3/MM3 (ref 3.4–10.8)
WBC MORPH BLD: NORMAL

## 2021-03-01 PROCEDURE — 85007 BL SMEAR W/DIFF WBC COUNT: CPT | Performed by: INTERNAL MEDICINE

## 2021-03-01 PROCEDURE — 80053 COMPREHEN METABOLIC PANEL: CPT | Performed by: INTERNAL MEDICINE

## 2021-03-01 PROCEDURE — 84100 ASSAY OF PHOSPHORUS: CPT | Performed by: INTERNAL MEDICINE

## 2021-03-01 PROCEDURE — 84295 ASSAY OF SERUM SODIUM: CPT | Performed by: INTERNAL MEDICINE

## 2021-03-01 PROCEDURE — 92610 EVALUATE SWALLOWING FUNCTION: CPT

## 2021-03-01 PROCEDURE — 25010000002 ENOXAPARIN PER 10 MG: Performed by: NURSE PRACTITIONER

## 2021-03-01 PROCEDURE — 25010000002 DESMOPRESSIN PER 1 MCG: Performed by: INTERNAL MEDICINE

## 2021-03-01 PROCEDURE — 25010000002 ONDANSETRON PER 1 MG: Performed by: INTERNAL MEDICINE

## 2021-03-01 PROCEDURE — 82962 GLUCOSE BLOOD TEST: CPT

## 2021-03-01 PROCEDURE — 84134 ASSAY OF PREALBUMIN: CPT | Performed by: INTERNAL MEDICINE

## 2021-03-01 PROCEDURE — 99233 SBSQ HOSP IP/OBS HIGH 50: CPT | Performed by: INTERNAL MEDICINE

## 2021-03-01 PROCEDURE — 83735 ASSAY OF MAGNESIUM: CPT | Performed by: INTERNAL MEDICINE

## 2021-03-01 PROCEDURE — 86140 C-REACTIVE PROTEIN: CPT | Performed by: INTERNAL MEDICINE

## 2021-03-01 PROCEDURE — 85025 COMPLETE CBC W/AUTO DIFF WBC: CPT | Performed by: INTERNAL MEDICINE

## 2021-03-01 PROCEDURE — 63710000001 INSULIN REGULAR HUMAN PER 5 UNITS: Performed by: INTERNAL MEDICINE

## 2021-03-01 PROCEDURE — 84478 ASSAY OF TRIGLYCERIDES: CPT | Performed by: INTERNAL MEDICINE

## 2021-03-01 PROCEDURE — 94799 UNLISTED PULMONARY SVC/PX: CPT

## 2021-03-01 PROCEDURE — 25010000002 LORAZEPAM PER 2 MG: Performed by: INTERNAL MEDICINE

## 2021-03-01 RX ORDER — HYDROCHLOROTHIAZIDE 12.5 MG/1
12.5 TABLET ORAL DAILY
Status: DISCONTINUED | OUTPATIENT
Start: 2021-03-02 | End: 2021-03-09

## 2021-03-01 RX ADMIN — DESMOPRESSIN ACETATE 0.52 MCG: 4 SOLUTION INTRAVENOUS at 11:35

## 2021-03-01 RX ADMIN — ENOXAPARIN SODIUM 40 MG: 40 INJECTION SUBCUTANEOUS at 09:11

## 2021-03-01 RX ADMIN — QUETIAPINE FUMARATE 200 MG: 100 TABLET ORAL at 20:33

## 2021-03-01 RX ADMIN — ACETAMINOPHEN 649.6 MG: 160 SOLUTION ORAL at 20:50

## 2021-03-01 RX ADMIN — FOLIC ACID 1 MG: 1 TABLET ORAL at 09:11

## 2021-03-01 RX ADMIN — LORAZEPAM 2 MG: 2 INJECTION INTRAMUSCULAR; INTRAVENOUS at 09:09

## 2021-03-01 RX ADMIN — LANSOPRAZOLE 30 MG: KIT at 18:09

## 2021-03-01 RX ADMIN — DOCUSATE SODIUM 100 MG: 50 LIQUID ORAL at 09:09

## 2021-03-01 RX ADMIN — DIAZEPAM 30 MG: 5 TABLET ORAL at 23:07

## 2021-03-01 RX ADMIN — SENNOSIDES 10 ML: 8.8 LIQUID ORAL at 09:09

## 2021-03-01 RX ADMIN — QUETIAPINE FUMARATE 200 MG: 100 TABLET ORAL at 09:10

## 2021-03-01 RX ADMIN — INSULIN HUMAN 2 UNITS: 100 INJECTION, SOLUTION PARENTERAL at 11:34

## 2021-03-01 RX ADMIN — LORAZEPAM 2 MG: 2 INJECTION INTRAMUSCULAR; INTRAVENOUS at 01:52

## 2021-03-01 RX ADMIN — ACETAMINOPHEN 650 MG: 160 SOLUTION ORAL at 05:05

## 2021-03-01 RX ADMIN — DIAZEPAM 30 MG: 5 TABLET ORAL at 18:09

## 2021-03-01 RX ADMIN — INSULIN HUMAN 2 UNITS: 100 INJECTION, SOLUTION PARENTERAL at 05:30

## 2021-03-01 RX ADMIN — SENNOSIDES 10 ML: 8.8 LIQUID ORAL at 20:32

## 2021-03-01 RX ADMIN — DIAZEPAM 30 MG: 5 TABLET ORAL at 05:05

## 2021-03-01 RX ADMIN — LEVOTHYROXINE SODIUM 88 MCG: 88 TABLET ORAL at 05:05

## 2021-03-01 RX ADMIN — DOCUSATE SODIUM 100 MG: 50 LIQUID ORAL at 20:32

## 2021-03-01 RX ADMIN — ACETAMINOPHEN 649.6 MG: 160 SOLUTION ORAL at 11:36

## 2021-03-01 RX ADMIN — Medication 10 MG: at 20:32

## 2021-03-01 RX ADMIN — SODIUM CHLORIDE, PRESERVATIVE FREE 10 ML: 5 INJECTION INTRAVENOUS at 20:33

## 2021-03-01 RX ADMIN — LORAZEPAM 2 MG: 2 INJECTION INTRAMUSCULAR; INTRAVENOUS at 03:00

## 2021-03-01 RX ADMIN — Medication 1 TABLET: at 09:10

## 2021-03-01 RX ADMIN — HYDROCHLOROTHIAZIDE 25 MG: 25 TABLET ORAL at 09:10

## 2021-03-01 RX ADMIN — INSULIN HUMAN 2 UNITS: 100 INJECTION, SOLUTION PARENTERAL at 23:07

## 2021-03-01 RX ADMIN — LANSOPRAZOLE 30 MG: KIT at 09:09

## 2021-03-01 RX ADMIN — LORAZEPAM 2 MG: 2 INJECTION INTRAMUSCULAR; INTRAVENOUS at 11:36

## 2021-03-01 RX ADMIN — THIAMINE HCL TAB 100 MG 100 MG: 100 TAB at 09:11

## 2021-03-01 RX ADMIN — DIAZEPAM 30 MG: 5 TABLET ORAL at 11:35

## 2021-03-01 RX ADMIN — LORAZEPAM 4 MG: 2 INJECTION INTRAMUSCULAR; INTRAVENOUS at 21:02

## 2021-03-01 RX ADMIN — ONDANSETRON 4 MG: 2 INJECTION INTRAMUSCULAR; INTRAVENOUS at 11:36

## 2021-03-01 NOTE — PROGRESS NOTES
"   LOS: 8 days    Patient Care Team:  Provider, No Known as PCP - General  Provider, No Known as PCP - Family Medicine    Chief Complaint: Suicide attempt  Consulted for diabetes insipidus, hyper natremia, chronic lithium use  Subjective     Interval History:   Urine output 445 5 mL, a drop from 5085 mL a drop from 7950 mL over the last 3 days, sodium 141, patient received DDAVP low-dose 2/27/2021.  Urine output has been dropping at this time with electrolytes acceptable ongoing case with fluids.  Patient is off the lithium.  Overall condition is improved    Review of Systems:   Following few commands, unable to obtain a good review of system from the patient.    Objective     Vital Sign Min/Max for last 24 hours  Temp  Min: 98.7 °F (37.1 °C)  Max: 100.9 °F (38.3 °C)   BP  Min: 130/90  Max: 167/81   Pulse  Min: 97  Max: 116   Resp  Min: 18  Max: 24   SpO2  Min: 91 %  Max: 100 %   Flow (L/min)  Min: 1  Max: 2   No data recorded     Flowsheet Rows      First Filed Value   Admission Height  170.2 cm (67\") Documented at 02/21/2021 1759   Admission Weight  81.6 kg (180 lb) Documented at 02/21/2021 1759          I/O this shift:  In: 356 [I.V.:10; Other:258; NG/GT:88]  Out: -   I/O last 3 completed shifts:  In: 5651.9 [I.V.:1162.9; Other:1768; NG/GT:2546; IV Piggyback:175]  Out: 4455 [Urine:4455]    Physical Exam:  General Appearance:  female comfortable, no obvious distress, more awake, confused at this time following few commands  Eyes: Pupils are equal.  EOMI  Oral mucosa moist.  Neck: Supple no JVD.  Lungs: Clear auscultation, no rales rhonchi's, equal chest movement, nonlabored.  Heart: No gallop, murmur, rub, RRR.  Abdomen: Soft, positive bowel sounds, no organomegaly.  Extremities: No edema, no cyanosis.  Neuro: Awake, alert, following few commands, no focal deficit.  Skin: Warm and dry.   no suprapubic fullness, no Ng catheter      WBC WBC   Date Value Ref Range Status   03/01/2021 7.90 3.40 - 10.80 " 10*3/mm3 Final   02/28/2021 5.51 3.40 - 10.80 10*3/mm3 Final   02/27/2021 6.77 3.40 - 10.80 10*3/mm3 Final      HGB Hemoglobin   Date Value Ref Range Status   03/01/2021 11.4 (L) 12.0 - 15.9 g/dL Final   02/28/2021 10.3 (L) 12.0 - 15.9 g/dL Final   02/27/2021 9.7 (L) 12.0 - 15.9 g/dL Final      HCT Hematocrit   Date Value Ref Range Status   03/01/2021 39.2 34.0 - 46.6 % Final   02/28/2021 36.8 34.0 - 46.6 % Final   02/27/2021 34.3 34.0 - 46.6 % Final      Platlets No results found for: LABPLAT   MCV MCV   Date Value Ref Range Status   03/01/2021 85.4 79.0 - 97.0 fL Final   02/28/2021 87.6 79.0 - 97.0 fL Final   02/27/2021 87.5 79.0 - 97.0 fL Final          Sodium Sodium   Date Value Ref Range Status   03/01/2021 141 136 - 145 mmol/L Final   02/28/2021 139 136 - 145 mmol/L Final   02/28/2021 139 136 - 145 mmol/L Final   02/28/2021 137 136 - 145 mmol/L Final   02/28/2021 141 136 - 145 mmol/L Final   02/27/2021 142 136 - 145 mmol/L Final   02/27/2021 144 136 - 145 mmol/L Final      Potassium Potassium   Date Value Ref Range Status   03/01/2021 3.7 3.5 - 5.2 mmol/L Final   02/28/2021 5.9 (H) 3.5 - 5.2 mmol/L Final     Comment:     Specimen hemolyzed.  Results may be affected.   02/28/2021 3.5 3.5 - 5.2 mmol/L Final   02/27/2021 3.9 3.5 - 5.2 mmol/L Final      Chloride Chloride   Date Value Ref Range Status   03/01/2021 101 98 - 107 mmol/L Final   02/28/2021 103 98 - 107 mmol/L Final   02/27/2021 111 (H) 98 - 107 mmol/L Final      CO2 CO2   Date Value Ref Range Status   03/01/2021 27.0 22.0 - 29.0 mmol/L Final   02/28/2021 21.0 (L) 22.0 - 29.0 mmol/L Final   02/27/2021 23.0 22.0 - 29.0 mmol/L Final      BUN BUN   Date Value Ref Range Status   03/01/2021 12 6 - 20 mg/dL Final   02/28/2021 16 6 - 20 mg/dL Final   02/27/2021 15 6 - 20 mg/dL Final      Creatinine Creatinine   Date Value Ref Range Status   03/01/2021 0.81 0.57 - 1.00 mg/dL Final   02/28/2021 0.76 0.57 - 1.00 mg/dL Final   02/27/2021 0.77 0.57 - 1.00 mg/dL  Final      Calcium Calcium   Date Value Ref Range Status   03/01/2021 10.2 8.6 - 10.5 mg/dL Final   02/28/2021 9.2 8.6 - 10.5 mg/dL Final   02/27/2021 9.0 8.6 - 10.5 mg/dL Final      PO4 No results found for: CAPO4   Albumin Albumin   Date Value Ref Range Status   03/01/2021 4.20 3.50 - 5.20 g/dL Final   02/28/2021 3.50 3.50 - 5.20 g/dL Final   02/27/2021 3.30 (L) 3.50 - 5.20 g/dL Final      Magnesium Magnesium   Date Value Ref Range Status   03/01/2021 2.4 1.6 - 2.6 mg/dL Final   02/28/2021 2.0 1.6 - 2.6 mg/dL Final   02/27/2021 2.0 1.6 - 2.6 mg/dL Final      Uric Acid No results found for: URICACID        Results Review:     I reviewed the patient's new clinical results.    diazePAM, 30 mg, Nasogastric, Q6H  docusate sodium, 100 mg, Nasogastric, BID  enoxaparin, 40 mg, Subcutaneous, Q24H  folic acid, 1 mg, Nasogastric, Daily  hydroCHLOROthiazide, 25 mg, Nasogastric, Daily  insulin regular, 0-7 Units, Subcutaneous, Q6H  lansoprazole, 30 mg, Nasogastric, BID AC  levothyroxine, 88 mcg, Nasogastric, Q AM  melatonin, 10 mg, Nasogastric, Nightly  multivitamin chewable, 1 tablet, Nasogastric, Daily  QUEtiapine, 200 mg, Nasogastric, Nightly  QUEtiapine, 200 mg, Nasogastric, Daily  sennosides, 10 mL, Nasogastric, BID  sodium chloride, 10 mL, Intravenous, Q12H  thiamine, 100 mg, Nasogastric, Daily      Dexmedetomidine HCl in NaCl, 0.2-1.5 mcg/kg/hr, Last Rate: Stopped (02/28/21 1250)        Medication Review: As noted above    Assessment/Plan       Alcohol withdrawal    Suicide attempt (CMS/HCC)    Refusal of blood transfusions as patient is Buddhism    Toxic effect of caustic substance  1.    Diabetes insipidus: Lithium use  Patient has a chronic use of lithium stopped during this admission, polyuria likely secondary to lithium use, since the patient admitted and unable to drink enough fluid on her on IV fluids have been given at this time.  Her admitting sodium was normal 137.   free water through NG tube.    2.   Polyuria: Secondary to above.  3.  Suicide attempt.  Plan:  1.  Plan to give 1 more dose of DDAVP today urine output has been greater than 4000 mL, sodium level 141, patient had a high NG residual free water has been on hold.  Continue with free water when GI improved.  2.  To decrease the polyuria we may have to add hydrochlorothiazide 12.5 mg and give DDAVP on as needed  3.   UA did not show any glucose.  4.  Her other option option could be use of nonsteroidals low-dose, in this patient it can be used as the renal function is normal.  But needs to be followed closely.  Case discussed with Doctor Ericka yesterday, also consider decrease protein and salt intake.  As discussed with the medical staff  High risk patient with multiple medical problems.  Critically ill  Continue free water 75 mL/h at this time.       Miguel Angel Stover MD  03/01/21  10:27 EST

## 2021-03-01 NOTE — PLAN OF CARE
"Goal Outcome Evaluation:  Plan of Care Reviewed With: patient  Progress: improving  Patient's mother updated by phone, she says that the patient's psychiatrist at the [facility] had mentioned a few days ago that she would like to see patient weaned off of all psych meds (including paxil, neurontin, seroquel, lithium etc) before transfer to inpatient [facility] so the patient can have \"clean slate\" for evaluation.   Patient's mother was given 's phone number-she has many questions about disposition after discharge and would benefit from call from .  Patient remains pleasantly confused but cooperative  She is restless at times but this seemed to be worse when she was due to void  She is off precedex  The patient may benefit from lower seroquel AM dose as it seemed to make her unable to form coherent sentences  Able to void x1 of 650 ml of urine after abbasi removal, but only after we lifted her to the bedside commode with chair lift     "

## 2021-03-01 NOTE — PROGRESS NOTES
Clinical Nutrition     Nutrition Support Assessment  Reason for Visit:   ILEANA MCCORMACK      Patient Name: Elvia Montero  YOB: 1975  MRN: [id number]  Date of Encounter: 03/01/21 11:19 EST  Admission date: 2/21/2021      Nutrition Assessment   Assessment   Suicide Attempt s/p ingestion 1/2 cup Dora Estrella, bottle of wine, klonopin, seroquel  Caustic Esophagitis  Etoh w/d  Hypernatremia- resolved,  suspected due to diabetes insipidus    s/p EGD 2-22-21: inflammation of the uvula, grade c esophagitis, minimal injury, normal stomach, normal duodenum    PMH: She  has a past medical history of Anxiety, Bipolar 1 disorder (CMS/HCC), Depression, Disease of thyroid gland, Esophagitis, Gastroparesis, GERD (gastroesophageal reflux disease), and Obsessive compulsive disorder (2020).   PSxH: She  has a past surgical history that includes Neck surgery and Esophagogastroduodenoscopy (N/A, 2/22/2021).     Substance abuse: Etoh    Reported/Observed/Food/Nutrition Related History:     Pt resting in bed, on room air, much more alert today    Per RN: pt is oriented to person, cooperative, follows commands, has congested cough, 1.5L UOP per shift, had 525ml GRV last night, no bm      Anthropometrics     Height: 67in  Weight: 215lb  BMI: 34  Obese Class I: 30-34.9kg/m2    Labs reviewed     Results from last 7 days   Lab Units 03/01/21  0421 02/28/21 2024 02/28/21  1212 02/28/21  0707  02/27/21  1151   GLUCOSE mg/dL 154*  --   --  173*  --  133*   BUN mg/dL 12  --   --  16  --  15   CREATININE mg/dL 0.81  --   --  0.76  --  0.77   SODIUM mmol/L 141 139 139 137   < > 144   CHLORIDE mmol/L 101  --   --  103  --  111*   POTASSIUM mmol/L 3.7 5.9*  --  3.5  --  3.9   PHOSPHORUS mg/dL 3.4  --   --  4.4  --  3.6   MAGNESIUM mg/dL 2.4  --   --  2.0  --  2.0   ALT (SGPT) U/L 53*  --   --  44*  --  37*    < > = values in this interval not displayed.     Results from last 7 days   Lab Units 03/01/21  0421 02/28/21  0763  02/27/21  1151  02/23/21  0813   ALBUMIN g/dL 4.20 3.50 3.30*   < > 3.80  3.70   PREALBUMIN mg/dL 30.0  --   --   --  30.3   CRP mg/dL 10.96*  --   --   --  4.51*   CHOLESTEROL mg/dL  --   --   --   --  115   TRIGLYCERIDES mg/dL 251*  --   --   --  347*    < > = values in this interval not displayed.       Results from last 7 days   Lab Units 03/01/21  1105 03/01/21  0524 02/28/21  2326 02/28/21  1708 02/28/21  1135 02/28/21  0526   GLUCOSE mg/dL 173* 153* 160* 125 161* 142*     Lab Results   Lab Value Date/Time    HGBA1C 6.10 (H) 02/22/2021 0714     NO NEW LABS    Medications reviewed   Pertinent: valium, lovenox, thiamine, folate, MVI, lansoprazole, colace, senokot      Intake/Ouptut 24 hrs (7:00AM - 6:59 AM)     Intake & Output (last day)       02/28 0701 - 03/01 0700 03/01 0701 - 03/02 0700    I.V. (mL/kg) 323.8 (3.3) 10 (0.1)    Other 1333 258    NG/GT 1470 88    IV Piggyback 75     Total Intake(mL/kg) 3201.8 (32.3) 356 (3.6)    Urine (mL/kg/hr) 3395 (1.4)     Total Output 3395     Net -193.2 +356                     GI: esophagitis    SKIN: bruised    Needs Assessment       Height used 67in   Weight used ABW 215lb   IBW 123lb     Estimated need Method/Equation used Result    Energy/Calorie need  kcals/d 14kcal per kg ABW 1368kcal    MSJ ABW 1645kcal goal        Protein   g/day 1-1.2g protein per kg ABW 98-117g protein goal per Renal    2g protein per kg IBW 123g protein                    Current Nutrition Prescription     PO: NPO Diet     EN: Peptamen VHP @50ml/hr, free water @75ml/hr    Intake: 1050ml, 1050kcal, 97g protein, 882ml free water    TF at goal volume will provide 1000ml, 1000kcal, 61% kcal needs, 92g protein,  94% protein needs, 2340ml free water, 28meq Na+       Nutrition Diagnosis     2-23-21, 3-1  Problem Inadequate energy intake   Etiology Per Clinical Status   Signs/Symptoms NPO   Status: ongoing    Nutrition Intervention   1.  Follow treatment progress    Will adjust TF to lower protein  formula, with low Na+ per Renal request     Peptamen AF @70ml/hr, free water @75ml/hr    TF at goal will provide: 1400ml, 1680kcal, 106g protein, 2634ml free water, 45meq Na+    Consider decreasing free water to 50ml/hr, as hypernatremia resolved    Goal:   General: Nutrition support treatment    EN/PN: Adjust EN    Monitoring/Evaluation:   Per protocol, I&O, Pertinent labs, Weight, Skin status, GI status, Symptoms, Hemodynamic stability    Abbie Coburn RD, CNSC  Time Spent: 45min

## 2021-03-01 NOTE — PROGRESS NOTES
Continued Stay Note  [facility]     Patient Name: Elvia Montero  MRN: [id number]  Today's Date: 3/1/2021    Admit Date: 2/21/2021    Discharge Plan     Row Name 03/01/21 1450       Plan    Plan  Ongoing    Plan Comments  Patient still has sitter, Speech eval today, NPO.  Patient still has NG with tube feedings.  CM will continue to follow.        Discharge Codes    No documentation.       Expected Discharge Date and Time     Expected Discharge Date Expected Discharge Time    Mar 6, 2021             Pamela Huynh RN

## 2021-03-01 NOTE — PLAN OF CARE
Problem: Adult Inpatient Plan of Care  Goal: Plan of Care Review  Flowsheets (Taken 3/1/2021 9317)  Progress: improving  Plan of Care Reviewed With: patient  Outcome Summary: Precedex remains off. Ativan given per CIWA. Pt restless at times, awake for majority of night. Becoming more oriented but emotions are labile. Pt has congested but productive cough. TF residuals as high as 525, refed 250 and restarted at goal later per APRN. UOP ~1200mls. Ng catheter d/c this AM. Temperature 100.9-99.1, tylenol given x1. 1:1 sitter remains at bedside.

## 2021-03-01 NOTE — PLAN OF CARE
Goal Outcome Evaluation:  Plan of Care Reviewed With: patient  Progress: no change   SLP evaluation completed. Will address suspected pharyngeal dysphagia w/ FEES tomorrow as appropriate. Please see note for further details and recommendations.

## 2021-03-01 NOTE — THERAPY EVALUATION
Acute Care - Speech Language Pathology   Swallow Initial Evaluation [facility]   Clinical Swallow Evaluation     Patient Name: Elvia Montero  : 1975  MRN: [id number]  Today's Date: 3/1/2021               Admit Date: 2021    Visit Dx:     ICD-10-CM ICD-9-CM   1. Suicide attempt (CMS/[facility])  T14.91XA E958.9   2. Toxic effect of caustic substance, intentional self-harm, initial encounter (CMS/HCC)  T54.92XA 983.9     E950.7   3. Esophagitis  K20.90 530.10   4. Alcoholic intoxication without complication (CMS/[facility])  F10.920 305.00   5. Dysphagia, unspecified type  R13.10 787.20     Patient Active Problem List   Diagnosis   • Suicide attempt (CMS/[facility])   • Lactic acidosis   • Refusal of blood transfusions as patient is Amish   • Elevated ETOH level   • Toxic effect of caustic substance   • Esophagitis   • Alcohol withdrawal     Past Medical History:   Diagnosis Date   • Anxiety    • Bipolar 1 disorder (CMS/[facility])    • Depression    • Disease of thyroid gland    • Esophagitis    • Gastroparesis    • GERD (gastroesophageal reflux disease)    • Obsessive compulsive disorder      Past Surgical History:   Procedure Laterality Date   • ENDOSCOPY N/A 2021    Procedure: ESOPHAGOGASTRODUODENOSCOPY;  Surgeon: Chaparro Chester MD;  Location: [facility] END[facility];  Service: Gastroenterology;  Laterality: N/A;   • NECK SURGERY          SWALLOW EVALUATION (last 72 hours)      SLP Adult Swallow Evaluation     Row Name 21 0930                   Rehab Evaluation    Document Type  evaluation  -RD        Subjective Information  no complaints  -RD        Patient Observations  alert;cooperative;agree to therapy  -RD        Patient/Family/Caregiver Comments/Observations  No family present  -RD        Patient Effort  good  -RD           General Information    Patient Profile Reviewed  yes  -RD        Pertinent History Of Current Problem  Adm w/ alcohol withdrawal, attempted suicide, toxic effect of caustic  substance, esophageal inflammation, does not accept blood products 2' Jehovah's wittness. Consult for swallowing.   -RD        Current Method of Nutrition  NPO;nasogastric feedings  -RD        Precautions/Limitations, Vision  WFL;for purposes of eval  -RD        Precautions/Limitations, Hearing  WFL;for purposes of eval  -RD        Prior Level of Function-Communication  unknown  -RD        Prior Level of Function-Swallowing  safe, efficient swallowing in all situations  -RD        Plans/Goals Discussed with  patient;agreed upon  -RD        Barriers to Rehab  medically complex  -RD        Patient's Goals for Discharge  eat/drink without coughing/choking  -RD           Pain    Additional Documentation  Pain Scale: Numbers Pre/Post-Treatment (Group)  -RD           Pain Scale: Numbers Pre/Post-Treatment    Pretreatment Pain Rating  0/10 - no pain  -RD        Posttreatment Pain Rating  0/10 - no pain  -RD           Oral Motor Structure and Function    Dentition Assessment  natural, present and adequate  -RD        Secretion Management  problems swallowing secretions  -RD        Mucosal Quality  dry  -RD        Volitional Swallow  weak;delayed  -RD        Volitional Cough  weak;non-productive  -RD           Oral Musculature and Cranial Nerve Assessment    Oral Motor General Assessment  generalized oral motor weakness;lingual impairment  -RD        Lingual Impairment, Detail. Cranial Nerves IX, XII (Glossopharyngeal and Hypoglossal)  reduced strength;bilaterally;reduced lingual ROM  -RD           General Eating/Swallowing Observations    Respiratory Support Currently in Use  nasal cannula  -RD        Eating/Swallowing Skills  fed by SLP  -RD        Positioning During Eating  upright 90 degree;upright in bed  -RD        Utensils Used  spoon  -RD        Consistencies Trialed  ice chips;thin liquids;pureed  -RD           Clinical Swallow Eval    Pharyngeal Phase  suspected pharyngeal impairment  -RD        Esophageal Phase   unremarkable  -RD           Pharyngeal Phase Concerns    Pharyngeal Phase Concerns  wet vocal quality;multiple swallows;cough;throat clear  -RD        Wet Vocal Quality  thin;pudding  -RD        Multiple Swallows  pudding  -RD        Cough  thin;pudding  -RD        Throat Clear  thin  -RD        Pharyngeal Phase Concerns, Comment  Overt s/s of aspiration c/b immediate throat clear, coughing, and wet vocal quality w/ thins via ice/tsp. Multiple swallows, inconsistent wet vocal quality and delayed coughing w/ pureed. Suspected severe pharyngeal dysphagia. Will give another day given severity and plan for FEES tomorrow to see if safe for any PO vs. begin strengthening as indicated  -RD           Clinical Impression    SLP Swallowing Diagnosis  suspected pharyngeal dysphagia  -RD        Functional Impact  risk of aspiration/pneumonia  -RD        Rehab Potential/Prognosis, Swallowing  good, to achieve stated therapy goals  -RD        Swallow Criteria for Skilled Therapeutic Interventions Met  demonstrates skilled criteria  -RD           Recommendations    Therapy Frequency (Swallow)  PRN  -RD        Predicted Duration Therapy Intervention (Days)  until discharge  -RD        SLP Diet Recommendation  NPO;temporary alternate methods of nutrition/hydration  -RD        Recommended Diagnostics  FEES;other (see comments) 3/2/21  -RD        Oral Care Recommendations  Oral Care BID/PRN  -RD        SLP Rec. for Method of Medication Administration  meds via alternate route  -RD        Monitor for Signs of Aspiration  yes;notify SLP if any concerns  -RD        Anticipated Discharge Disposition (SLP)  unknown;anticipate therapy at next level of care  -RD          User Key  (r) = Recorded By, (t) = Taken By, (c) = Cosigned By    Initials Name Effective Dates    Sherry Chávez MS CCC-SLP 04/03/18 -           EDUCATION  The patient has been educated in the following areas:   Dysphagia (Swallowing Impairment) Oral Care/Hydration  Modified Diet Instruction.    SLP Recommendation and Plan  SLP Swallowing Diagnosis: suspected pharyngeal dysphagia  SLP Diet Recommendation: NPO, temporary alternate methods of nutrition/hydration     SLP Rec. for Method of Medication Administration: meds via alternate route     Monitor for Signs of Aspiration: yes, notify SLP if any concerns  Recommended Diagnostics: FEES, other (see comments)(3/2/21)  Swallow Criteria for Skilled Therapeutic Interventions Met: demonstrates skilled criteria  Anticipated Discharge Disposition (SLP): unknown, anticipate therapy at next level of care  Rehab Potential/Prognosis, Swallowing: good, to achieve stated therapy goals  Therapy Frequency (Swallow): PRN  Predicted Duration Therapy Intervention (Days): until discharge                         Plan of Care Reviewed With: patient  Progress: no change           Time Calculation:   Time Calculation- SLP     Row Name 03/01/21 1121             Time Calculation- SLP    SLP Start Time  0930  -RD      SLP Received On  03/01/21  -RD        User Key  (r) = Recorded By, (t) = Taken By, (c) = Cosigned By    Initials Name Provider Type    Sherry Chávez MS CCC-SLP Speech and Language Pathologist          Therapy Charges for Today     Code Description Service Date Service Provider Modifiers Qty    [id number]  ST EVAL ORAL PHARYNG SWALLOW 3 3/1/2021 Sherry Wooten MS CCC-SLP GN 1            Patient was not wearing a face mask and did exhibit coughing during this therapy encounter.  Procedure performed was aerosolizing, involved close contact (within 6 feet for at least 15 minutes or longer), and did not involve contact with infectious secretions or specimens.  Therapist used appropriate personal protective equipment including gloves, standard procedure mask and eye protection.  Appropriate PPE was worn during the entire therapy session.  Hand hygiene was completed before and after therapy session.     Sherry Wooten MS  CCC-SLP  3/1/2021

## 2021-03-01 NOTE — PROGRESS NOTES
"INTENSIVIST NOTE     Hospital Day: 8    Ms. Elvia Montero, 45 y.o. female is followed for:   Suicide attempt and alcohol withdrawal       SUBJECTIVE     45-year-old female with past history of anxiety/depression/bipolar disorder and alcohol abuse admitted on 2/21 after attempted suicide where she ingested a partial bottle of Mr. John and a bottle of wine.  She underwent EGD on 2/22 showing grade C esophagitis with minimal injury and inflamed uvula.  Her hospital course has been characterized by alcohol withdrawal and agitated delirium.  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    Interval history:    More oriented at times though typically is oriented x1.  Maximum temperature 100.9.  Fluid balance even.  Family at bedside.  Remains on Precedex, high-dose Valium, and high-dose Seroquel.    The patient's relevant past medical, surgical and social history were reviewed and updated in Epic as appropriate.       OBJECTIVE     Vital Sign Min/Max for last 24 hours  Temp  Min: 98.4 °F (36.9 °C)  Max: 100.9 °F (38.3 °C)   BP  Min: 130/90  Max: 170/106   Pulse  Min: 97  Max: 116   Resp  Min: 18  Max: 24   SpO2  Min: 91 %  Max: 100 %   No data recorded   No data recorded      Intake/Output Summary (Last 24 hours) at 3/1/2021 1614  Last data filed at 3/1/2021 1500  Gross per 24 hour   Intake 2187.4 ml   Output 2195 ml   Net -7.6 ml      Flowsheet Rows      First Filed Value   Admission Height  170.2 cm (67\") Documented at 02/21/2021 1759   Admission Weight  81.6 kg (180 lb) Documented at 02/21/2021 1759             02/23/21  0538 02/26/21  0600 02/27/21 2034   Weight: 97.9 kg (215 lb 13.3 oz) 100 kg (220 lb 10.9 oz) 99.2 kg (218 lb 11.1 oz)            Objective:  General Appearance:  In no acute distress.    Vital signs: (most recent): Blood pressure 147/84, pulse 97, temperature 98.4 °F (36.9 °C), temperature source Oral, resp. rate 18, height 170.2 cm (67\"), weight 99.2 kg (218 " lb 11.1 oz), last menstrual period 02/14/2021, SpO2 93 %.    HEENT: Normal HEENT exam.  (NG tube in place)    Lungs:  Normal effort and normal respiratory rate.  Breath sounds clear to auscultation.  She is not in respiratory distress.  No rales, wheezes or rhonchi.    Heart: Normal rate.  Regular rhythm.  S1 normal and S2 normal.  No murmur, gallop or friction rub.   Chest: Symmetric chest wall expansion.   Abdomen: Abdomen is soft and non-distended.  Bowel sounds are normal.   There is no abdominal tenderness.   There is no mass. There is no splenomegaly. There is no hepatomegaly.   Extremities: There is no deformity or dependent edema.    Neurological: Patient is alert.  (Confused).    Pupils:  Pupils are equal, round, and reactive to light.    Skin:  Warm and dry.              I reviewed the patient's new clinical results.  I reviewed the patient's new imaging results/reports including actual images and agree with reports.      Chest X-Ray: No additional    INFUSIONS  Dexmedetomidine HCl in NaCl, 0.2-1.5 mcg/kg/hr, Last Rate: Stopped (02/28/21 1250)        Results from last 7 days   Lab Units 03/01/21  0421 02/28/21  0707 02/27/21  1151   WBC 10*3/mm3 7.90 5.51 6.77   HEMOGLOBIN g/dL 11.4* 10.3* 9.7*   HEMATOCRIT % 39.2 36.8 34.3   PLATELETS 10*3/mm3 273 192 172     Results from last 7 days   Lab Units 03/01/21  1159 03/01/21  0421 02/28/21 2024 02/28/21  0707  02/27/21  1151   SODIUM mmol/L 139 141 139   < > 137   < > 144   POTASSIUM mmol/L  --  3.7 5.9*  --  3.5  --  3.9   CHLORIDE mmol/L  --  101  --   --  103  --  111*   CO2 mmol/L  --  27.0  --   --  21.0*  --  23.0   BUN mg/dL  --  12  --   --  16  --  15   GLUCOSE mg/dL  --  154*  --   --  173*  --  133*   CREATININE mg/dL  --  0.81  --   --  0.76  --  0.77   MAGNESIUM mg/dL  --  2.4  --   --  2.0  --  2.0   CALCIUM mg/dL  --  10.2  --   --  9.2  --  9.0    < > = values in this interval not displayed.                 [facility] Ventilation:      I reviewed  the patient's medications.    Assessment/Plan   ASSESSMENT/PLAN     Active Hospital Problems    Diagnosis   • **Alcohol withdrawal   • Nephrogenic diabetes insipidus (CMS/HCC)   • Suicide attempt (CMS/HCC)   • Refusal of blood transfusions as patient is [employer]   • Toxic effect of caustic substance     Added automatically from request for surgery [id number]         1. Alcohol withdrawal: Agitated delirium on high-dose Valium, high-dose Seroquel, and Precedex IV  2. Suicide attempt with toxic ingestion as described: Upper GI injury mild upper endoscopy  3. Anxiety/depression/bipolar disorder  4. 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 Dysphagia    1. Additional DDAVP per renal  2. HCTZ  3. High-dose Valium and Seroquel  4. Wean Precedex as tolerated  5. Sliding-scale insulin  6. Lovenox for DVT prophylaxis  7. Tube feeds until able to take p.o.     I discussed the patient's findings and my recommendations with patient, family and nursing staff     Plan of care and goals reviewed with multidisciplinary team at daily rounds.    High level of risk due to:  illness with threat to life or bodily function and drugs requiring intensive monitoring for toxicity.    aRmon Shankar MD  Pulmonary and Critical Care Medicine  03/01/21 16:14 EST

## 2021-03-02 ENCOUNTER — APPOINTMENT (OUTPATIENT)
Dept: GENERAL RADIOLOGY | Facility: HOSPITAL | Age: 46
End: 2021-03-02

## 2021-03-02 LAB
ALBUMIN SERPL-MCNC: 4.4 G/DL (ref 3.5–5.2)
ANION GAP SERPL CALCULATED.3IONS-SCNC: 16 MMOL/L (ref 5–15)
ANISOCYTOSIS BLD QL: ABNORMAL
BASOPHILS # BLD MANUAL: 0.13 10*3/MM3 (ref 0–0.2)
BASOPHILS NFR BLD AUTO: 1 % (ref 0–1.5)
BUN SERPL-MCNC: 13 MG/DL (ref 6–20)
BUN/CREAT SERPL: 19.4 (ref 7–25)
CALCIUM SPEC-SCNC: 10.2 MG/DL (ref 8.6–10.5)
CHLORIDE SERPL-SCNC: 101 MMOL/L (ref 98–107)
CLUMPED PLATELETS: PRESENT
CO2 SERPL-SCNC: 25 MMOL/L (ref 22–29)
CREAT SERPL-MCNC: 0.67 MG/DL (ref 0.57–1)
DEPRECATED RDW RBC AUTO: 71.8 FL (ref 37–54)
EOSINOPHIL # BLD MANUAL: 0 10*3/MM3 (ref 0–0.4)
EOSINOPHIL NFR BLD MANUAL: 0 % (ref 0.3–6.2)
ERYTHROCYTE [DISTWIDTH] IN BLOOD BY AUTOMATED COUNT: 23.9 % (ref 12.3–15.4)
GFR SERPL CREATININE-BSD FRML MDRD: 94 ML/MIN/1.73
GLUCOSE BLDC GLUCOMTR-MCNC: 167 MG/DL (ref 70–130)
GLUCOSE BLDC GLUCOMTR-MCNC: 179 MG/DL (ref 70–130)
GLUCOSE BLDC GLUCOMTR-MCNC: 185 MG/DL (ref 70–130)
GLUCOSE BLDC GLUCOMTR-MCNC: 231 MG/DL (ref 70–130)
GLUCOSE SERPL-MCNC: 181 MG/DL (ref 65–99)
HCT VFR BLD AUTO: 39.8 % (ref 34–46.6)
HGB BLD-MCNC: 11 G/DL (ref 12–15.9)
HYPOCHROMIA BLD QL: ABNORMAL
LARGE PLATELETS: ABNORMAL
LYMPHOCYTES # BLD MANUAL: 1.07 10*3/MM3 (ref 0.7–3.1)
LYMPHOCYTES NFR BLD MANUAL: 4 % (ref 5–12)
LYMPHOCYTES NFR BLD MANUAL: 8 % (ref 19.6–45.3)
MAGNESIUM SERPL-MCNC: 2.3 MG/DL (ref 1.6–2.6)
MCH RBC QN AUTO: 24.3 PG (ref 26.6–33)
MCHC RBC AUTO-ENTMCNC: 27.6 G/DL (ref 31.5–35.7)
MCV RBC AUTO: 87.9 FL (ref 79–97)
METAMYELOCYTES NFR BLD MANUAL: 1 % (ref 0–0)
MONOCYTES # BLD AUTO: 0.54 10*3/MM3 (ref 0.1–0.9)
NEUTROPHILS # BLD AUTO: 11.37 10*3/MM3 (ref 1.7–7)
NEUTROPHILS NFR BLD MANUAL: 74 % (ref 42.7–76)
NEUTS BAND NFR BLD MANUAL: 11 % (ref 0–5)
PHOSPHATE SERPL-MCNC: 2.3 MG/DL (ref 2.5–4.5)
PHOSPHATE SERPL-MCNC: 2.9 MG/DL (ref 2.5–4.5)
PLATELET # BLD AUTO: 307 10*3/MM3 (ref 140–450)
PMV BLD AUTO: 11.6 FL (ref 6–12)
POLYCHROMASIA BLD QL SMEAR: ABNORMAL
POTASSIUM SERPL-SCNC: 4.4 MMOL/L (ref 3.5–5.2)
RBC # BLD AUTO: 4.53 10*6/MM3 (ref 3.77–5.28)
SODIUM SERPL-SCNC: 139 MMOL/L (ref 136–145)
SODIUM SERPL-SCNC: 140 MMOL/L (ref 136–145)
SODIUM SERPL-SCNC: 142 MMOL/L (ref 136–145)
VARIANT LYMPHS NFR BLD MANUAL: 1 % (ref 0–5)
WBC # BLD AUTO: 13.38 10*3/MM3 (ref 3.4–10.8)
WBC MORPH BLD: NORMAL

## 2021-03-02 PROCEDURE — 84100 ASSAY OF PHOSPHORUS: CPT | Performed by: INTERNAL MEDICINE

## 2021-03-02 PROCEDURE — 94799 UNLISTED PULMONARY SVC/PX: CPT

## 2021-03-02 PROCEDURE — 71045 X-RAY EXAM CHEST 1 VIEW: CPT

## 2021-03-02 PROCEDURE — 25010000002 ONDANSETRON PER 1 MG: Performed by: INTERNAL MEDICINE

## 2021-03-02 PROCEDURE — 25010000002 ENOXAPARIN PER 10 MG: Performed by: NURSE PRACTITIONER

## 2021-03-02 PROCEDURE — 85007 BL SMEAR W/DIFF WBC COUNT: CPT | Performed by: INTERNAL MEDICINE

## 2021-03-02 PROCEDURE — 82962 GLUCOSE BLOOD TEST: CPT

## 2021-03-02 PROCEDURE — 80069 RENAL FUNCTION PANEL: CPT | Performed by: INTERNAL MEDICINE

## 2021-03-02 PROCEDURE — 84295 ASSAY OF SERUM SODIUM: CPT | Performed by: INTERNAL MEDICINE

## 2021-03-02 PROCEDURE — 85025 COMPLETE CBC W/AUTO DIFF WBC: CPT | Performed by: INTERNAL MEDICINE

## 2021-03-02 PROCEDURE — 25010000002 LORAZEPAM PER 2 MG: Performed by: INTERNAL MEDICINE

## 2021-03-02 PROCEDURE — 83735 ASSAY OF MAGNESIUM: CPT | Performed by: INTERNAL MEDICINE

## 2021-03-02 PROCEDURE — 99233 SBSQ HOSP IP/OBS HIGH 50: CPT | Performed by: INTERNAL MEDICINE

## 2021-03-02 PROCEDURE — 63710000001 INSULIN REGULAR HUMAN PER 5 UNITS: Performed by: INTERNAL MEDICINE

## 2021-03-02 RX ORDER — CARVEDILOL 3.12 MG/1
3.12 TABLET ORAL 2 TIMES DAILY WITH MEALS
Status: DISCONTINUED | OUTPATIENT
Start: 2021-03-02 | End: 2021-03-05

## 2021-03-02 RX ORDER — WATER 1000 ML/1000ML
INJECTION, SOLUTION INTRAVENOUS
Status: DISPENSED
Start: 2021-03-02 | End: 2021-03-02

## 2021-03-02 RX ORDER — CARVEDILOL 3.12 MG/1
3.12 TABLET ORAL 2 TIMES DAILY WITH MEALS
Status: DISCONTINUED | OUTPATIENT
Start: 2021-03-02 | End: 2021-03-02

## 2021-03-02 RX ADMIN — INSULIN HUMAN 2 UNITS: 100 INJECTION, SOLUTION PARENTERAL at 11:35

## 2021-03-02 RX ADMIN — SENNOSIDES 10 ML: 8.8 LIQUID ORAL at 20:59

## 2021-03-02 RX ADMIN — Medication 10 MG: at 20:58

## 2021-03-02 RX ADMIN — POTASSIUM & SODIUM PHOSPHATES POWDER PACK 280-160-250 MG 2 PACKET: 280-160-250 PACK at 11:36

## 2021-03-02 RX ADMIN — LORAZEPAM 4 MG: 2 INJECTION INTRAMUSCULAR; INTRAVENOUS at 04:00

## 2021-03-02 RX ADMIN — Medication 1 TABLET: at 08:05

## 2021-03-02 RX ADMIN — LEVOTHYROXINE SODIUM 88 MCG: 88 TABLET ORAL at 05:13

## 2021-03-02 RX ADMIN — DOCUSATE SODIUM 100 MG: 50 LIQUID ORAL at 20:59

## 2021-03-02 RX ADMIN — ONDANSETRON 4 MG: 2 INJECTION INTRAMUSCULAR; INTRAVENOUS at 20:15

## 2021-03-02 RX ADMIN — LORAZEPAM 2 MG: 2 INJECTION INTRAMUSCULAR; INTRAVENOUS at 08:36

## 2021-03-02 RX ADMIN — LORAZEPAM 2 MG: 2 INJECTION INTRAMUSCULAR; INTRAVENOUS at 14:26

## 2021-03-02 RX ADMIN — LORAZEPAM 2 MG: 2 INJECTION INTRAMUSCULAR; INTRAVENOUS at 02:37

## 2021-03-02 RX ADMIN — DOCUSATE SODIUM 100 MG: 50 LIQUID ORAL at 08:05

## 2021-03-02 RX ADMIN — LANSOPRAZOLE 30 MG: KIT at 07:44

## 2021-03-02 RX ADMIN — DEXMEDETOMIDINE HYDROCHLORIDE 0.2 MCG/KG/HR: 4 INJECTION, SOLUTION INTRAVENOUS at 04:46

## 2021-03-02 RX ADMIN — LANSOPRAZOLE 30 MG: KIT at 18:35

## 2021-03-02 RX ADMIN — THIAMINE HCL TAB 100 MG 100 MG: 100 TAB at 08:05

## 2021-03-02 RX ADMIN — DIAZEPAM 30 MG: 5 TABLET ORAL at 05:14

## 2021-03-02 RX ADMIN — DIAZEPAM 30 MG: 5 TABLET ORAL at 11:35

## 2021-03-02 RX ADMIN — SENNOSIDES 10 ML: 8.8 LIQUID ORAL at 08:05

## 2021-03-02 RX ADMIN — FOLIC ACID 1 MG: 1 TABLET ORAL at 08:05

## 2021-03-02 RX ADMIN — LORAZEPAM 2 MG: 2 INJECTION INTRAMUSCULAR; INTRAVENOUS at 19:29

## 2021-03-02 RX ADMIN — SODIUM CHLORIDE, PRESERVATIVE FREE 10 ML: 5 INJECTION INTRAVENOUS at 20:59

## 2021-03-02 RX ADMIN — LORAZEPAM 4 MG: 2 INJECTION INTRAMUSCULAR; INTRAVENOUS at 05:40

## 2021-03-02 RX ADMIN — ENOXAPARIN SODIUM 40 MG: 40 INJECTION SUBCUTANEOUS at 08:05

## 2021-03-02 RX ADMIN — CARVEDILOL 3.12 MG: 3.12 TABLET, FILM COATED ORAL at 18:26

## 2021-03-02 RX ADMIN — INSULIN HUMAN 3 UNITS: 100 INJECTION, SOLUTION PARENTERAL at 23:05

## 2021-03-02 RX ADMIN — DIAZEPAM 30 MG: 5 TABLET ORAL at 18:26

## 2021-03-02 RX ADMIN — QUETIAPINE FUMARATE 200 MG: 100 TABLET ORAL at 08:05

## 2021-03-02 RX ADMIN — INSULIN HUMAN 2 UNITS: 100 INJECTION, SOLUTION PARENTERAL at 18:26

## 2021-03-02 RX ADMIN — QUETIAPINE FUMARATE 200 MG: 100 TABLET ORAL at 20:58

## 2021-03-02 RX ADMIN — INSULIN HUMAN 2 UNITS: 100 INJECTION, SOLUTION PARENTERAL at 05:13

## 2021-03-02 RX ADMIN — HYDROCHLOROTHIAZIDE 12.5 MG: 12.5 CAPSULE ORAL at 08:05

## 2021-03-02 RX ADMIN — ACETAMINOPHEN 650 MG: 160 SOLUTION ORAL at 02:05

## 2021-03-02 NOTE — SIGNIFICANT NOTE
03/02/21 0851   SLP Deferred Reason   SLP Deferred Reason Routine  (Spoke w/ RN this AM. Not appropriate for FEES today. Will f/u tomorrow.)

## 2021-03-02 NOTE — PLAN OF CARE
"Goal Outcome Evaluation:        Outcome Summary: Pt mental status improved tonight as patient can answer person, place, and situation accurately and consistently state year though not month.  However continues to have significant agitation and restlessness, requiring a total of 10mg of Ativan IVP tonight along with scheduled medications.  However was able to stay off precedex.  Pt did appear to choke early in the night and while she states it felt as if it was secretions and denied nausea, her tube feeding residuals have been consistently high.  Tube feeding restarted at 35mL of feeding and 75mL of water, with residuals still consistently greater than 300mL  Pt is afebrile throughout the night, remains hyperensive and tachycardic.  Pt continues to report periodic headache.  CIWA scores between 4 and 19.     ADDENDUM    AM restlessness/agitation continued to escalate to the point that an additional 4mg Lorazepam IVP was given and demedotemodine was restarted and increased to eventually reach 1.5 mcg/kg/min.  This was in addition to valium 30mg po given per PEG.  Despite this pt continued to attempt to get out of bed and required gentle but consistent restraint by multiple staff members to keep patient in bed.  Orientation remains to person place, and situation, however pt appears to be in a panic state and repeats the phrase \"I can't do it\" over and over.    Confusion increases, pt beginning to ask questions, unable to complete sentences, at one point is unable to recognize nurse sitter who has not left her side at any time in the night.  Remains extremely restless despite all pharamcological interventions.  With concern over increasing residuals and choking episodes from earlier the safest course of action is deemed to be to restraini the patient as all other nonpharmacologic and pharmacologic methods have been deployed without avail.    "

## 2021-03-02 NOTE — PROGRESS NOTES
"INTENSIVIST NOTE     Hospital Day: 9    Ms. Elvia Montero, 45 y.o. female is followed for:   Suicide attempt and alcohol withdrawal       SUBJECTIVE     45-year-old female with past history of anxiety/depression/bipolar disorder and alcohol abuse admitted on 2/21 after attempted suicide where she ingested a partial bottle of Mr. John and a bottle of wine.  She underwent EGD on 2/22 showing grade C esophagitis with minimal injury and inflamed uvula.  Her hospital course has been characterized by alcohol withdrawal and agitated delirium.  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    Interval history:    Increasing agitation last night requiring resumption of Precedex.  Remains on high-dose Seroquel and Valium.  Low-grade fever.  No infiltrates by chest x-ray.    The patient's relevant past medical, surgical and social history were reviewed and updated in Epic as appropriate.       OBJECTIVE     Vital Sign Min/Max for last 24 hours  Temp  Min: 97.8 °F (36.6 °C)  Max: 100.9 °F (38.3 °C)   BP  Min: 119/60  Max: 174/87   Pulse  Min: 93  Max: 125   Resp  Min: 16  Max: 20   SpO2  Min: 89 %  Max: 96 %   No data recorded   No data recorded      Intake/Output Summary (Last 24 hours) at 3/2/2021 1400  Last data filed at 3/2/2021 1147  Gross per 24 hour   Intake 2287.3 ml   Output 1225 ml   Net 1062.3 ml      Flowsheet Rows      First Filed Value   Admission Height  170.2 cm (67\") Documented at 02/21/2021 1759   Admission Weight  81.6 kg (180 lb) Documented at 02/21/2021 1759             02/23/21  0538 02/26/21  0600 02/27/21 2034   Weight: 97.9 kg (215 lb 13.3 oz) 100 kg (220 lb 10.9 oz) 99.2 kg (218 lb 11.1 oz)            Objective:  General Appearance:  In no acute distress.    Vital signs: (most recent): Blood pressure 140/67, pulse 101, temperature 100 °F (37.8 °C), temperature source Axillary, resp. rate 18, height 170.2 cm (67\"), weight 99.2 kg (218 lb 11.1 oz), last menstrual " period 02/14/2021, SpO2 95 %.    HEENT: Normal HEENT exam.  (NG tube in place)    Lungs:  Normal effort and normal respiratory rate.  Breath sounds clear to auscultation.  She is not in respiratory distress.  No rales, wheezes or rhonchi.    Heart: Normal rate.  Regular rhythm.  S1 normal and S2 normal.  No murmur, gallop or friction rub.   Chest: Symmetric chest wall expansion.   Abdomen: Abdomen is soft and non-distended.  Bowel sounds are normal.   There is no abdominal tenderness.   There is no mass. There is no splenomegaly. There is no hepatomegaly.   Extremities: There is no deformity or dependent edema.    Neurological: Patient is alert.  (Confused).    Pupils:  Pupils are equal, round, and reactive to light.    Skin:  Warm and dry.              I reviewed the patient's new clinical results.  I reviewed the patient's new imaging results/reports including actual images and agree with reports.      Chest X-Ray: No consolidation.    INFUSIONS  Dexmedetomidine HCl in NaCl, 0.2-1.5 mcg/kg/hr, Last Rate: 0.2 mcg/kg/hr (03/02/21 1147)        Results from last 7 days   Lab Units 03/02/21  0543 03/01/21 0421 02/28/21  0707   WBC 10*3/mm3 13.38* 7.90 5.51   HEMOGLOBIN g/dL 11.0* 11.4* 10.3*   HEMATOCRIT % 39.8 39.2 36.8   PLATELETS 10*3/mm3 307 273 192     Results from last 7 days   Lab Units 03/02/21  1157 03/02/21  0543 03/01/21 2014 03/01/21 0421 02/28/21 2024 02/28/21  0707   SODIUM mmol/L 139 142 139   < > 141 139   < > 137   POTASSIUM mmol/L  --  4.4  --   --  3.7 5.9*  --  3.5   CHLORIDE mmol/L  --  101  --   --  101  --   --  103   CO2 mmol/L  --  25.0  --   --  27.0  --   --  21.0*   BUN mg/dL  --  13  --   --  12  --   --  16   GLUCOSE mg/dL  --  181*  --   --  154*  --   --  173*   CREATININE mg/dL  --  0.67  --   --  0.81  --   --  0.76   MAGNESIUM mg/dL  --  2.3  --   --  2.4  --   --  2.0   CALCIUM mg/dL  --  10.2  --   --  10.2  --   --  9.2    < > = values in this interval not displayed.                  [facility] Ventilation:      I reviewed the patient's medications.    Assessment/Plan   ASSESSMENT/PLAN     Active Hospital Problems    Diagnosis   • **Alcohol withdrawal   • Nephrogenic diabetes insipidus (CMS/HCC)   • Suicide attempt (CMS/HCC)   • Refusal of blood transfusions as patient is Faith   • Toxic effect of caustic substance     Added automatically from request for surgery [id number]         1. Alcohol withdrawal: Agitated delirium on high-dose Valium and high-dose Seroquel.  Became increasingly agitated last night requiring resumption of Precedex.  2. Suicide attempt with toxic ingestion as described: Upper GI injury mild per upper endoscopy on admission  3. Anxiety/depression/bipolar disorder  4. 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 Dysphagia    1. Additional DDAVP per renal.  Held today.  2. HCTZ  3. High-dose Valium and Seroquel  4. Have needed to reinstitute Precedex.  Wean Precedex as tolerated  5. Sliding-scale insulin  6. Lovenox for DVT prophylaxis  7. Tube feeds need to continue.  Not able to pass a dysphagia evaluation at this point.     I discussed the patient's findings and my recommendations with patient, family and nursing staff     Plan of care and goals reviewed with multidisciplinary team at daily rounds.    High level of risk due to:  illness with threat to life or bodily function and drugs requiring intensive monitoring for toxicity.    Ramon Shankar MD  Pulmonary and Critical Care Medicine  03/02/21 14:00 EST

## 2021-03-02 NOTE — PROGRESS NOTES
"   LOS: 9 days    Patient Care Team:  Provider, No Known as PCP - General  Provider, No Known as PCP - Family Medicine    Chief Complaint: Suicide attempt  Consulted for diabetes insipidus, hyper natremia, chronic lithium use  Subjective     Interval History:   Urine output 445 5 mL, a drop from 5085 mL a drop from 7950 mL over the last 3 days, sodium 141, patient received DDAVP low-dose 2/27/2021.  Urine output has been dropping at this time with electrolytes acceptable ongoing case with fluids.  Patient is off the lithium.  Overall condition is improved    Review of Systems:   Following few commands, unable to obtain a good review of system from the patient.    Objective     Vital Sign Min/Max for last 24 hours  Temp  Min: 97.8 °F (36.6 °C)  Max: 100 °F (37.8 °C)   BP  Min: 131/82  Max: 174/80   Pulse  Min: 93  Max: 118   Resp  Min: 16  Max: 20   SpO2  Min: 89 %  Max: 96 %   Flow (L/min)  Min: 1  Max: 1   No data recorded     Flowsheet Rows      First Filed Value   Admission Height  170.2 cm (67\") Documented at 02/21/2021 1759   Admission Weight  81.6 kg (180 lb) Documented at 02/21/2021 1759          I/O this shift:  In: 143.3 [I.V.:83.3; NG/GT:60]  Out: -   I/O last 3 completed shifts:  In: 3438.4 [I.V.:280.4; Other:1696; NG/GT:1462]  Out: 2495 [Urine:2495]    Physical Exam:  General Appearance: Alert, non engaging, NAD  Eyes: Pupils are equal.  EOMI  Neck: Supple no JVD.  Lungs: Clear auscultation,  nonlabored.  Heart: No gallop, murmur, rub, RRR.  Abdomen: Soft, positive bowel sounds, no organomegaly.  Extremities: No edema, no cyanosis.  Neuro: Awake, alert, no focal deficit.  Skin: Warm and dry.        WBC WBC   Date Value Ref Range Status   03/02/2021 13.38 (H) 3.40 - 10.80 10*3/mm3 Final   03/01/2021 7.90 3.40 - 10.80 10*3/mm3 Final   02/28/2021 5.51 3.40 - 10.80 10*3/mm3 Final   02/27/2021 6.77 3.40 - 10.80 10*3/mm3 Final      HGB Hemoglobin   Date Value Ref Range Status   03/02/2021 11.0 (L) 12.0 - 15.9 " g/dL Final   03/01/2021 11.4 (L) 12.0 - 15.9 g/dL Final   02/28/2021 10.3 (L) 12.0 - 15.9 g/dL Final   02/27/2021 9.7 (L) 12.0 - 15.9 g/dL Final      HCT Hematocrit   Date Value Ref Range Status   03/02/2021 39.8 34.0 - 46.6 % Final   03/01/2021 39.2 34.0 - 46.6 % Final   02/28/2021 36.8 34.0 - 46.6 % Final   02/27/2021 34.3 34.0 - 46.6 % Final      Platlets No results found for: LABPLAT   MCV MCV   Date Value Ref Range Status   03/02/2021 87.9 79.0 - 97.0 fL Final   03/01/2021 85.4 79.0 - 97.0 fL Final   02/28/2021 87.6 79.0 - 97.0 fL Final   02/27/2021 87.5 79.0 - 97.0 fL Final          Sodium Sodium   Date Value Ref Range Status   03/02/2021 142 136 - 145 mmol/L Final   03/01/2021 139 136 - 145 mmol/L Final   03/01/2021 139 136 - 145 mmol/L Final   03/01/2021 141 136 - 145 mmol/L Final   02/28/2021 139 136 - 145 mmol/L Final   02/28/2021 139 136 - 145 mmol/L Final   02/28/2021 137 136 - 145 mmol/L Final   02/28/2021 141 136 - 145 mmol/L Final   02/27/2021 142 136 - 145 mmol/L Final   02/27/2021 144 136 - 145 mmol/L Final      Potassium Potassium   Date Value Ref Range Status   03/02/2021 4.4 3.5 - 5.2 mmol/L Final     Comment:     Slight hemolysis detected by analyzer. Results may be affected.   03/01/2021 3.7 3.5 - 5.2 mmol/L Final   02/28/2021 5.9 (H) 3.5 - 5.2 mmol/L Final     Comment:     Specimen hemolyzed.  Results may be affected.   02/28/2021 3.5 3.5 - 5.2 mmol/L Final   02/27/2021 3.9 3.5 - 5.2 mmol/L Final      Chloride Chloride   Date Value Ref Range Status   03/02/2021 101 98 - 107 mmol/L Final   03/01/2021 101 98 - 107 mmol/L Final   02/28/2021 103 98 - 107 mmol/L Final   02/27/2021 111 (H) 98 - 107 mmol/L Final      CO2 CO2   Date Value Ref Range Status   03/02/2021 25.0 22.0 - 29.0 mmol/L Final   03/01/2021 27.0 22.0 - 29.0 mmol/L Final   02/28/2021 21.0 (L) 22.0 - 29.0 mmol/L Final   02/27/2021 23.0 22.0 - 29.0 mmol/L Final      BUN BUN   Date Value Ref Range Status   03/02/2021 13 6 - 20 mg/dL  Final   03/01/2021 12 6 - 20 mg/dL Final   02/28/2021 16 6 - 20 mg/dL Final   02/27/2021 15 6 - 20 mg/dL Final      Creatinine Creatinine   Date Value Ref Range Status   03/02/2021 0.67 0.57 - 1.00 mg/dL Final   03/01/2021 0.81 0.57 - 1.00 mg/dL Final   02/28/2021 0.76 0.57 - 1.00 mg/dL Final   02/27/2021 0.77 0.57 - 1.00 mg/dL Final      Calcium Calcium   Date Value Ref Range Status   03/02/2021 10.2 8.6 - 10.5 mg/dL Final   03/01/2021 10.2 8.6 - 10.5 mg/dL Final   02/28/2021 9.2 8.6 - 10.5 mg/dL Final   02/27/2021 9.0 8.6 - 10.5 mg/dL Final      PO4 No results found for: CAPO4   Albumin Albumin   Date Value Ref Range Status   03/02/2021 4.40 3.50 - 5.20 g/dL Final   03/01/2021 4.20 3.50 - 5.20 g/dL Final   02/28/2021 3.50 3.50 - 5.20 g/dL Final   02/27/2021 3.30 (L) 3.50 - 5.20 g/dL Final      Magnesium Magnesium   Date Value Ref Range Status   03/02/2021 2.3 1.6 - 2.6 mg/dL Final   03/01/2021 2.4 1.6 - 2.6 mg/dL Final   02/28/2021 2.0 1.6 - 2.6 mg/dL Final   02/27/2021 2.0 1.6 - 2.6 mg/dL Final      Uric Acid No results found for: URICACID        Results Review:     I reviewed the patient's new clinical results.    diazePAM, 30 mg, Nasogastric, Q6H  docusate sodium, 100 mg, Nasogastric, BID  enoxaparin, 40 mg, Subcutaneous, Q24H  folic acid, 1 mg, Nasogastric, Daily  hydroCHLOROthiazide, 12.5 mg, Nasogastric, Daily  insulin regular, 0-7 Units, Subcutaneous, Q6H  lansoprazole, 30 mg, Nasogastric, BID AC  levothyroxine, 88 mcg, Nasogastric, Q AM  melatonin, 10 mg, Nasogastric, Nightly  multivitamin chewable, 1 tablet, Nasogastric, Daily  QUEtiapine, 200 mg, Nasogastric, Nightly  QUEtiapine, 200 mg, Nasogastric, Daily  sennosides, 10 mL, Nasogastric, BID  sodium chloride, 10 mL, Intravenous, Q12H  thiamine, 100 mg, Nasogastric, Daily      Dexmedetomidine HCl in NaCl, 0.2-1.5 mcg/kg/hr, Last Rate: 0.6 mcg/kg/hr (03/02/21 0830)        Medication Review: As noted above    Assessment/Plan       Alcohol withdrawal     Suicide attempt (CMS/[facility])    Refusal of blood transfusions as patient is Sabianist    Toxic effect of caustic substance    Nephrogenic diabetes insipidus (CMS/HCC)    1.  Polyuria: Secondary to osmotic diuresis - Urine Osm 668 with normal serum sodium at presentation suggestive of osmotic diuresis plus urine osm X 24 hr urine volume is greater than 1000 mosmole - confirming osmotic diuresis rather than Water diuresis. Patient was on lithium now off.   2.  Suicide attempt.  3.  HTN and tachycardia     Plan:  - Monitor I/O. Will hold DDAVP today. UOP is decreasing. No saline infusion.    - Continue with HCTZ   - Will consider Indomethacin if no improvement.   - Continue with low solute diet. (low sodium and protein)   - Will add coreg 3.125 mg po q bid.     Case discussed with the medical staff and family at bedside.       Koby Mcmahon MD  03/02/21  09:04 EST

## 2021-03-02 NOTE — PROGRESS NOTES
Clinical Nutrition     Multidisciplinary Rounds      Patient Name: Elvia Montero  Date of Encounter: 03/02/21 07:49 EST  MRN: [id number]  Admission date: 2/21/2021      Reason for visit: MDR. RD to continue to follow per protocol.     Pt sleeping in bed, on nasal cannula    Per Night shift:  pt became agitated, tried to climb out of bed, choked when turned head away from side ngt hanging from, TF residuals 425ml at 2000, 600ml at 2130, TF held and restarted at 1/2 rate    Per MD: decrease free water to 40ml/hr    Current diet: NPO Diet  Orders Placed This Encounter      Diet, Tube Feeding Tube Feeding Formula: Peptamen AF (Peptide Based, Critical Care, ALI)  goal rate @70ml/hr, free water decreased to 40ml/hr (TF currently @45ml/hr)    Intervention:  Follow treatment plan  Care plan reviewed    If residuals continue to be > 500ml, rec place keofeed     Follow up:   Per protocol      Abbie Coburn RD  07:49 EST  Time: 20min

## 2021-03-02 NOTE — SIGNIFICANT NOTE
Pt became acutely restless and agitated at 2055, stating she was in a state of panic just after reporting headache.  Shortly after declaration pt was actively trying to climb out of bed despite bedside nurse providing gentle prevention from doing so.  Lorazepam 4mg  IVP given for CIWA score of 19.  During event pt turned to her left away from side NG tube was hanging and began to choke.  Pt coughed for approximately 30 seconds and recovered however did report that she felt as if she were choking.  No changes were noted to respiratory pattern, breath sounds or oxygen demands, though oxygen was increased for a short time following.  Tube feeding residual check again (noted to be 425 at 2000) and was 600mL (at 2130).  No tube feeding was returned and feeding was clamped for 1 hour.  Will restart at half the rate.  Pt sleeping calmly at 2200

## 2021-03-02 NOTE — PLAN OF CARE
"Goal Outcome Evaluation:  Plan of Care Reviewed With: patient  Progress: improving  Started off delirious, agitated, restless, sedated.  Around 1pm she woke up oriented x4, calm (with ativan x1 because she said she felt \"uneasy and anxious\")  She was able to get up to bedside commode a few times, pivot to chair, and even take a trip around the unit in her chair per request.  She continues to have slurred speech which she says feels like her tongue is numb and speech is slow  She only had 450ml of UOP and is visibly more generally edematous as well as her right arm and hand more edematous than left.   Small sip of water given to patient to swish and spit out-patient choked almost immediately on water.   "

## 2021-03-03 ENCOUNTER — APPOINTMENT (OUTPATIENT)
Dept: GENERAL RADIOLOGY | Facility: HOSPITAL | Age: 46
End: 2021-03-03

## 2021-03-03 ENCOUNTER — ANCILLARY PROCEDURE (OUTPATIENT)
Dept: SPEECH THERAPY | Facility: HOSPITAL | Age: 46
End: 2021-03-03

## 2021-03-03 LAB
ALBUMIN SERPL-MCNC: 4.1 G/DL (ref 3.5–5.2)
ANION GAP SERPL CALCULATED.3IONS-SCNC: 13 MMOL/L (ref 5–15)
BASOPHILS # BLD AUTO: 0.07 10*3/MM3 (ref 0–0.2)
BASOPHILS NFR BLD AUTO: 0.4 % (ref 0–1.5)
BUN SERPL-MCNC: 18 MG/DL (ref 6–20)
BUN/CREAT SERPL: 25.4 (ref 7–25)
CALCIUM SPEC-SCNC: 9.8 MG/DL (ref 8.6–10.5)
CHLORIDE SERPL-SCNC: 97 MMOL/L (ref 98–107)
CO2 SERPL-SCNC: 29 MMOL/L (ref 22–29)
CREAT SERPL-MCNC: 0.71 MG/DL (ref 0.57–1)
DEPRECATED RDW RBC AUTO: 72.9 FL (ref 37–54)
EOSINOPHIL # BLD AUTO: 0.16 10*3/MM3 (ref 0–0.4)
EOSINOPHIL NFR BLD AUTO: 0.9 % (ref 0.3–6.2)
ERYTHROCYTE [DISTWIDTH] IN BLOOD BY AUTOMATED COUNT: 23.5 % (ref 12.3–15.4)
GFR SERPL CREATININE-BSD FRML MDRD: 88 ML/MIN/1.73
GLUCOSE BLDC GLUCOMTR-MCNC: 178 MG/DL (ref 70–130)
GLUCOSE BLDC GLUCOMTR-MCNC: 200 MG/DL (ref 70–130)
GLUCOSE BLDC GLUCOMTR-MCNC: 211 MG/DL (ref 70–130)
GLUCOSE BLDC GLUCOMTR-MCNC: 340 MG/DL (ref 70–130)
GLUCOSE SERPL-MCNC: 171 MG/DL (ref 65–99)
HCT VFR BLD AUTO: 36.9 % (ref 34–46.6)
HGB BLD-MCNC: 10.2 G/DL (ref 12–15.9)
IMM GRANULOCYTES # BLD AUTO: 0.13 10*3/MM3 (ref 0–0.05)
IMM GRANULOCYTES NFR BLD AUTO: 0.7 % (ref 0–0.5)
LYMPHOCYTES # BLD AUTO: 2.37 10*3/MM3 (ref 0.7–3.1)
LYMPHOCYTES NFR BLD AUTO: 12.9 % (ref 19.6–45.3)
MAGNESIUM SERPL-MCNC: 2.4 MG/DL (ref 1.6–2.6)
MCH RBC QN AUTO: 24.4 PG (ref 26.6–33)
MCHC RBC AUTO-ENTMCNC: 27.6 G/DL (ref 31.5–35.7)
MCV RBC AUTO: 88.3 FL (ref 79–97)
MONOCYTES # BLD AUTO: 1.42 10*3/MM3 (ref 0.1–0.9)
MONOCYTES NFR BLD AUTO: 7.7 % (ref 5–12)
NEUTROPHILS NFR BLD AUTO: 14.23 10*3/MM3 (ref 1.7–7)
NEUTROPHILS NFR BLD AUTO: 77.4 % (ref 42.7–76)
NRBC BLD AUTO-RTO: 0.3 /100 WBC (ref 0–0.2)
PHOSPHATE SERPL-MCNC: 3 MG/DL (ref 2.5–4.5)
PLATELET # BLD AUTO: 441 10*3/MM3 (ref 140–450)
PMV BLD AUTO: 10.8 FL (ref 6–12)
POTASSIUM SERPL-SCNC: 3.2 MMOL/L (ref 3.5–5.2)
POTASSIUM SERPL-SCNC: 4.4 MMOL/L (ref 3.5–5.2)
RBC # BLD AUTO: 4.18 10*6/MM3 (ref 3.77–5.28)
SODIUM SERPL-SCNC: 137 MMOL/L (ref 136–145)
SODIUM SERPL-SCNC: 139 MMOL/L (ref 136–145)
WBC # BLD AUTO: 18.38 10*3/MM3 (ref 3.4–10.8)

## 2021-03-03 PROCEDURE — 84132 ASSAY OF SERUM POTASSIUM: CPT | Performed by: INTERNAL MEDICINE

## 2021-03-03 PROCEDURE — 63710000001 INSULIN REGULAR HUMAN PER 5 UNITS: Performed by: INTERNAL MEDICINE

## 2021-03-03 PROCEDURE — 25010000002 METHYLPREDNISOLONE PER 125 MG: Performed by: INTERNAL MEDICINE

## 2021-03-03 PROCEDURE — 92612 ENDOSCOPY SWALLOW (FEES) VID: CPT | Performed by: SPEECH-LANGUAGE PATHOLOGIST

## 2021-03-03 PROCEDURE — 82962 GLUCOSE BLOOD TEST: CPT

## 2021-03-03 PROCEDURE — 74018 RADEX ABDOMEN 1 VIEW: CPT

## 2021-03-03 PROCEDURE — 99233 SBSQ HOSP IP/OBS HIGH 50: CPT | Performed by: INTERNAL MEDICINE

## 2021-03-03 PROCEDURE — 84295 ASSAY OF SERUM SODIUM: CPT | Performed by: INTERNAL MEDICINE

## 2021-03-03 PROCEDURE — 85025 COMPLETE CBC W/AUTO DIFF WBC: CPT | Performed by: INTERNAL MEDICINE

## 2021-03-03 PROCEDURE — 94799 UNLISTED PULMONARY SVC/PX: CPT

## 2021-03-03 PROCEDURE — 80069 RENAL FUNCTION PANEL: CPT | Performed by: INTERNAL MEDICINE

## 2021-03-03 PROCEDURE — 25010000002 ENOXAPARIN PER 10 MG: Performed by: NURSE PRACTITIONER

## 2021-03-03 PROCEDURE — 94660 CPAP INITIATION&MGMT: CPT

## 2021-03-03 PROCEDURE — 25010000002 ONDANSETRON PER 1 MG: Performed by: INTERNAL MEDICINE

## 2021-03-03 PROCEDURE — 25010000002 LORAZEPAM PER 2 MG: Performed by: INTERNAL MEDICINE

## 2021-03-03 PROCEDURE — 83735 ASSAY OF MAGNESIUM: CPT | Performed by: INTERNAL MEDICINE

## 2021-03-03 RX ORDER — ASPIRIN 81 MG/1
TABLET, CHEWABLE ORAL
Status: DISPENSED
Start: 2021-03-03 | End: 2021-03-03

## 2021-03-03 RX ORDER — METHYLPREDNISOLONE SODIUM SUCCINATE 125 MG/2ML
60 INJECTION, POWDER, LYOPHILIZED, FOR SOLUTION INTRAMUSCULAR; INTRAVENOUS ONCE
Status: COMPLETED | OUTPATIENT
Start: 2021-03-03 | End: 2021-03-03

## 2021-03-03 RX ADMIN — INSULIN HUMAN 3 UNITS: 100 INJECTION, SOLUTION PARENTERAL at 18:39

## 2021-03-03 RX ADMIN — INSULIN HUMAN 5 UNITS: 100 INJECTION, SOLUTION PARENTERAL at 23:31

## 2021-03-03 RX ADMIN — INSULIN HUMAN 2 UNITS: 100 INJECTION, SOLUTION PARENTERAL at 05:15

## 2021-03-03 RX ADMIN — POTASSIUM CHLORIDE 40 MEQ: 1.5 POWDER, FOR SOLUTION ORAL at 06:35

## 2021-03-03 RX ADMIN — QUETIAPINE FUMARATE 200 MG: 100 TABLET ORAL at 21:31

## 2021-03-03 RX ADMIN — LEVOTHYROXINE SODIUM 88 MCG: 88 TABLET ORAL at 05:14

## 2021-03-03 RX ADMIN — DIAZEPAM 30 MG: 5 TABLET ORAL at 11:59

## 2021-03-03 RX ADMIN — DIAZEPAM 30 MG: 5 TABLET ORAL at 18:39

## 2021-03-03 RX ADMIN — INSULIN HUMAN 3 UNITS: 100 INJECTION, SOLUTION PARENTERAL at 11:59

## 2021-03-03 RX ADMIN — HYDROCHLOROTHIAZIDE 12.5 MG: 12.5 CAPSULE ORAL at 09:41

## 2021-03-03 RX ADMIN — SENNOSIDES 10 ML: 8.8 LIQUID ORAL at 21:31

## 2021-03-03 RX ADMIN — METHYLPREDNISOLONE SODIUM SUCCINATE 60 MG: 125 INJECTION, POWDER, FOR SOLUTION INTRAMUSCULAR; INTRAVENOUS at 14:21

## 2021-03-03 RX ADMIN — ACETAMINOPHEN 649.6 MG: 160 SOLUTION ORAL at 05:49

## 2021-03-03 RX ADMIN — ACETAMINOPHEN 649.6 MG: 160 SOLUTION ORAL at 21:37

## 2021-03-03 RX ADMIN — FOLIC ACID 1 MG: 1 TABLET ORAL at 09:40

## 2021-03-03 RX ADMIN — CARVEDILOL 3.12 MG: 3.12 TABLET, FILM COATED ORAL at 09:40

## 2021-03-03 RX ADMIN — LANSOPRAZOLE 30 MG: KIT at 18:39

## 2021-03-03 RX ADMIN — QUETIAPINE FUMARATE 200 MG: 100 TABLET ORAL at 09:41

## 2021-03-03 RX ADMIN — DOCUSATE SODIUM 100 MG: 50 LIQUID ORAL at 09:40

## 2021-03-03 RX ADMIN — LANSOPRAZOLE 30 MG: KIT at 06:35

## 2021-03-03 RX ADMIN — CARVEDILOL 3.12 MG: 3.12 TABLET, FILM COATED ORAL at 18:40

## 2021-03-03 RX ADMIN — SENNOSIDES 10 ML: 8.8 LIQUID ORAL at 09:40

## 2021-03-03 RX ADMIN — LORAZEPAM 2 MG: 2 INJECTION INTRAMUSCULAR; INTRAVENOUS at 01:49

## 2021-03-03 RX ADMIN — ENOXAPARIN SODIUM 40 MG: 40 INJECTION SUBCUTANEOUS at 09:40

## 2021-03-03 RX ADMIN — DIAZEPAM 30 MG: 5 TABLET ORAL at 01:21

## 2021-03-03 RX ADMIN — DIAZEPAM 30 MG: 5 TABLET ORAL at 05:15

## 2021-03-03 RX ADMIN — POTASSIUM CHLORIDE 40 MEQ: 1.5 POWDER, FOR SOLUTION ORAL at 11:59

## 2021-03-03 RX ADMIN — ONDANSETRON 4 MG: 2 INJECTION INTRAMUSCULAR; INTRAVENOUS at 01:26

## 2021-03-03 RX ADMIN — SODIUM CHLORIDE, PRESERVATIVE FREE 10 ML: 5 INJECTION INTRAVENOUS at 09:41

## 2021-03-03 RX ADMIN — SODIUM CHLORIDE, PRESERVATIVE FREE 10 ML: 5 INJECTION INTRAVENOUS at 21:40

## 2021-03-03 RX ADMIN — Medication 10 MG: at 21:32

## 2021-03-03 RX ADMIN — THIAMINE HCL TAB 100 MG 100 MG: 100 TAB at 09:41

## 2021-03-03 RX ADMIN — Medication 1 TABLET: at 09:45

## 2021-03-03 RX ADMIN — DOCUSATE SODIUM 100 MG: 50 LIQUID ORAL at 21:31

## 2021-03-03 NOTE — PROGRESS NOTES
Continued Stay Note  [facility]     Patient Name: Elvia Montero  MRN: [id number]  Today's Date: 3/3/2021    Admit Date: 2/21/2021    Discharge Plan     Row Name 03/03/21 1528       Plan    Plan  Ongoing    Plan Comments  Patient still has sitter, NG and is on tube feedings.  CM will continue to follow.        Discharge Codes    No documentation.       Expected Discharge Date and Time     Expected Discharge Date Expected Discharge Time    Mar 6, 2021             Pamela Huynh RN

## 2021-03-03 NOTE — PLAN OF CARE
Goal Outcome Evaluation:  Plan of Care Reviewed With: patient  Progress: no change   SLP evaluation completed. Will continue to address dysphagia. FEES completed, unsafe for any po intake at this time. Please see note for further details and recommendations.

## 2021-03-03 NOTE — MBS/VFSS/FEES
Acute Care - Speech Language Pathology   Swallow Initial Evaluation [facility]   Fiberoptic Endoscopic Evaluation of Swallowing (FEES)       Patient Name: Elvia Montero  : 1975  MRN: [id number]  Today's Date: 3/3/2021               Admit Date: 2021    Visit Dx:     ICD-10-CM ICD-9-CM   1. Suicide attempt (CMS/[facility])  T14.91XA E958.9   2. Toxic effect of caustic substance, intentional self-harm, initial encounter (CMS/HCC)  T54.92XA 983.9     E950.7   3. Esophagitis  K20.90 530.10   4. Alcoholic intoxication without complication (CMS/[facility])  F10.920 305.00   5. 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[facility])     Past Medical History:   Diagnosis Date   • Anxiety    • Bipolar 1 disorder (CMS/[facility])    • Depression    • Disease of thyroid gland    • Esophagitis    • Gastroparesis    • GERD (gastroesophageal reflux disease)    • Obsessive compulsive disorder      Past Surgical History:   Procedure Laterality Date   • ENDOSCOPY N/A 2021    Procedure: ESOPHAGOGASTRODUODENOSCOPY;  Surgeon: Chaparro Chester MD;  Location: [facility] ENDOSCOPY;  Service: Gastroenterology;  Laterality: N/A;   • NECK SURGERY          SWALLOW EVALUATION (last 72 hours)      [facility] Adult Swallow Evaluation     Row Name 21 1100 21 0930       Rehab Evaluation    Document Type  evaluation  -CJ  evaluation  -RD    Subjective Information  no complaints  -CJ  no complaints  -RD    Patient Observations  alert;cooperative  -CJ  alert;cooperative;agree to therapy  -RD    Patient/Family/Caregiver Comments/Observations  no family present  -CJ  No family present  -RD    Care Plan Review  evaluation/treatment results  reviewed;patient/other agree to care plan  -CJ  --    Patient Effort  good  -CJ  good  -RD    Symptoms Noted During/After Treatment  none  -CJ  --       General Information    Patient Profile Reviewed  yes  -CJ  yes  -RD    Pertinent History Of Current Problem  see initial eval; pt referred for FEES; pt agreeable to participate today  -CJ  Adm w/ alcohol withdrawal, attempted suicide, toxic effect of caustic substance, esophageal inflammation, does not accept blood products 2' Jehovah's wittness. Consult for swallowing.   -RD    Current Method of Nutrition  NPO;nasogastric feedings NG tube  -CJ  NPO;nasogastric feedings  -RD    Precautions/Limitations, Vision  WFL;for purposes of eval  -CJ  WFL;for purposes of eval  -RD    Precautions/Limitations, Hearing  WFL;for purposes of eval  -CJ  WFL;for purposes of eval  -RD    Prior Level of Function-Communication  unknown  -CJ  unknown  -RD    Prior Level of Function-Swallowing  safe, efficient swallowing in all situations  -CJ  safe, efficient swallowing in all situations  -RD    Plans/Goals Discussed with  patient;agreed upon  -CJ  patient;agreed upon  -RD    Barriers to Rehab  medically complex  -CJ  medically complex  -RD    Patient's Goals for Discharge  return to PO diet  -CJ  eat/drink without coughing/choking  -RD       Pain    Additional Documentation  Pain Scale: FACES Pre/Post-Treatment (Group)  -CJ  Pain Scale: Numbers Pre/Post-Treatment (Group)  -RD       Pain Scale: Numbers Pre/Post-Treatment    Pretreatment Pain Rating  --  0/10 - no pain  -RD    Posttreatment Pain Rating  --  0/10 - no pain  -RD       Pain Scale: FACES Pre/Post-Treatment    Pain: FACES Scale, Pretreatment  0-->no hurt  -CJ  --    Posttreatment Pain Rating  0-->no hurt  -CJ  --       Oral Motor Structure and Function    Dentition Assessment  --  natural, present and adequate  -RD    Secretion Management  --  problems swallowing secretions  -RD    Mucosal Quality  --  dry  -RD    Volitional  Swallow  --  weak;delayed  -RD    Volitional Cough  --  weak;non-productive  -RD       Oral Musculature and Cranial Nerve Assessment    Oral Motor General Assessment  --  generalized oral motor weakness;lingual impairment  -RD    Lingual Impairment, Detail. Cranial Nerves IX, XII (Glossopharyngeal and Hypoglossal)  --  reduced strength;bilaterally;reduced lingual ROM  -RD       General Eating/Swallowing Observations    Respiratory Support Currently in Use  --  nasal cannula  -RD    Eating/Swallowing Skills  --  fed by SLP  -RD    Positioning During Eating  --  upright 90 degree;upright in bed  -RD    Utensils Used  --  spoon  -RD    Consistencies Trialed  --  ice chips;thin liquids;pureed  -RD       Clinical Swallow Eval    Pharyngeal Phase  --  suspected pharyngeal impairment  -RD    Esophageal Phase  --  unremarkable  -RD       Pharyngeal Phase Concerns    Pharyngeal Phase Concerns  --  wet vocal quality;multiple swallows;cough;throat clear  -RD    Wet Vocal Quality  --  thin;pudding  -RD    Multiple Swallows  --  pudding  -RD    Cough  --  thin;pudding  -RD    Throat Clear  --  thin  -RD    Pharyngeal Phase Concerns, Comment  --  Overt s/s of aspiration c/b immediate throat clear, coughing, and wet vocal quality w/ thins via ice/tsp. Multiple swallows, inconsistent wet vocal quality and delayed coughing w/ pureed. Suspected severe pharyngeal dysphagia. Will give another day given severity and plan for FEES tomorrow to see if safe for any PO vs. begin strengthening as indicated  -RD       Fiberoptic Endoscopic Evaluation of Swallowing (FEES)    Risks/Benefits Reviewed  risks/benefits explained;patient;agreed to eval  -CJ  --    Nasal Entry  left:  -CJ  --    Scope serial number/identification  [id number]  -  --       Anatomy and Physiology    Anatomic Considerations  edema;erythema;arytenoids;other (see comments) unable to visualize vocal cords  -CJ  --    Comment  Severe edema of arytenoids that SLP unable to  visualize true vocal cords. Mild-moderate edema of epiglottis. Moderate amount of clear continous secretions  -CJ  --    Base of Tongue  symmetrical  -CJ  --    Epiglottis  edematous  -CJ  --    Laryngeal Function Breathing  CNA;other (see comments) unable to visualize  -CJ  --    Laryngeal Function Phonation  CNA;other (see comments) unable to visualize 2/2 edema  -CJ  --    Laryngeal Function to Breath Hold  CNA;other (see comments) unable to visualize 2/2 edema  -CJ  --    Secretion Rating Scale (Shailesh et al. 1996)  3- secretions inside the laryngeal vestibule/aspiration, not cleared  -CJ  --    Secretion Description  thin;clear;continuous  -CJ  --    Ice Chips  elicited swallow;partially cleared secretions;aspirated;no response to aspiration  -CJ  --    Spontaneous Swallow  frequency reduced;did not clear secretions  -CJ  --    Sensory  sensed scope  -CJ  --    Utensils Used  Spoon  -CJ  --    Consistencies Trialed  ice chips;thin liquids;pudding/puree  -CJ  --       FEES Interpretation    Oral Phase  prolonged manipulation;prespill of liquids into pharynx  -CJ  --       Initiation of Pharyngeal Swallow    Initiation of Pharyngeal Swallow  bolus in pyriform sinuses  -CJ  --    Pharyngeal Phase  impaired pharyngeal phase of swallowing  -CJ  --    Penetration During the Swallow  thin liquids;pudding/puree;secondary to delayed swallow initiation or mistiming;secondary to reduced laryngeal elevation;secondary to reduced vestibular closure  -CJ  --    Aspiration During the Swallow  thin liquids;pudding/puree;secondary to delayed swallow initiation or mistiming;secondary to reduced laryngeal elevation;secondary to reduced vestibular closure  -CJ  --    Penetration After the Swallow  thin liquids;pudding/puree;secondary to residue;in valleculae;in pyriform sinuses  -CJ  --    Aspiration After the Swallow  thin liquids;pudding/puree;secondary to residue;in valleculae;in pyriform sinuses;in laryngeal vestibule;other  (see comments) mixed w/ pooled secretions  -CJ  --    Response to Penetration  deep;no response;response with cue only;weak cough/throat clear  -CJ  --    Response to Aspiration  no response, silent aspiration;response with cue only;could not clear subglottic material;weak cough/throat clear  -CJ  --    Rosenbek's Scale  thin:;pudding/puree:;8-->Level 8  -CJ  --    Residue  thin liquids;pudding/puree;valleculae;pyriform sinuses;laryngeal vestibule;posterior pharyngeal wall;secondary to reduced posterior pharyngeal wall stripping;secondary to reduced laryngeal elevation;secondary to reduced hyolaryngeal excursion;other (see comments) severe amount  -CJ  --    Response to Residue  unable to clear residue;with spontaneous subsequent swallow;with liquid wash;with cued swallow  -CJ  --    FEES Summary  FEES completed this am. Ms. Montero is positioned upright and centered in bed to accept po presentations of ice chips x2, puree x1 and thin liquids x1. Further po presentations deferred 2/2 severity of dysphagia. Noted upon entry severe diffuse edema of arytenoids and laryngeal structures that SLP unable to visualize True vocal cords or airway. Noted large bore NG tube in place w/ increased edema on L side in comparison to R side. Noted significant amount of secretions in pyriforms and vestibule. Secretions are clear and thin, continuous. Ice chips elicited swallow w/ aspiration occuring, able to clear some secretions. Laryngeal penetration occuring during the swallow w/ thin and pudding when mixed w/ pooled secretions w/ subsequent silent aspiration during the swallow. Cued cough ineffective to clear aspirated material. Moderate-severe amount of diffuse pharyngeal residue w/ resulting silent aspiration occuring after the swallow. Cued cough able to clear some secretions to oral cavity. Per observed aspiration, severity of residue as well as significant edema further po presentations are deferred and endoscope is removed.  Per this evaluation Ms. Montero presents w/ a severe pharyngeal dysphagia w/ edema as significant contributing factor. She is felt to be at high risk for aspiration w/ all po intake. She is felt to most benefit from continued NPO w/ transition to keofeed for alternative method of nutrition/hydration.Suspect large bore is somewhat contributing to edema. Will benefit from dysphagia tx.   -CJ  --       Clinical Impression    SLP Swallowing Diagnosis  severe;pharyngeal dysphagia  -CJ  suspected pharyngeal dysphagia  -RD    Functional Impact  risk of aspiration/pneumonia  -CJ  risk of aspiration/pneumonia  -RD    Rehab Potential/Prognosis, Swallowing  good, to achieve stated therapy goals  -CJ  good, to achieve stated therapy goals  -RD    Swallow Criteria for Skilled Therapeutic Interventions Met  demonstrates skilled criteria  -CJ  demonstrates skilled criteria  -RD       Recommendations    Therapy Frequency (Swallow)  5 days per week  -CJ  PRN  -RD    Predicted Duration Therapy Intervention (Days)  until discharge  -CJ  until discharge  -RD    SLP Diet Recommendation  NPO;temporary alternate methods of nutrition/hydration;other (see comments) may benefit from change to keofeed  -CJ  NPO;temporary alternate methods of nutrition/hydration  -RD    Recommended Diagnostics  --  FEES;other (see comments) 3/2/21  -RD    Recommended Precautions and Strategies  general aspiration precautions  -CJ  --    Oral Care Recommendations  Oral Care BID/PRN;Suction toothbrush  -CJ  Oral Care BID/PRN  -RD    SLP Rec. for Method of Medication Administration  meds via alternate route  -CJ  meds via alternate route  -RD    Monitor for Signs of Aspiration  yes;notify SLP if any concerns  -CJ  yes;notify SLP if any concerns  -RD    Anticipated Discharge Disposition (SLP)  unknown;anticipate therapy at next level of care  -CJ  unknown;anticipate therapy at next level of care  -RD      User Key  (r) = Recorded By, (t) = Taken By, (c) = Cosigned  By    Initials Name Effective Dates    RD Sherry Wooten, MS CCC-SLP 04/03/18 -     CJ Eloise Kidd, MS CCC-SLP 02/11/20 -         **IMAGE** diffuse edema w/ continuous secretions prior to po presentations      EDUCATION  The patient has been educated in the following areas:   Dysphagia (Swallowing Impairment) Oral Care/Hydration NPO rationale.    SLP Recommendation and Plan  SLP Swallowing Diagnosis: severe, pharyngeal dysphagia  SLP Diet Recommendation: NPO, temporary alternate methods of nutrition/hydration, other (see comments)(may benefit from change to keofeed)  Recommended Precautions and Strategies: general aspiration precautions  SLP Rec. for Method of Medication Administration: meds via alternate route     Monitor for Signs of Aspiration: yes, notify SLP if any concerns     Swallow Criteria for Skilled Therapeutic Interventions Met: demonstrates skilled criteria  Anticipated Discharge Disposition (SLP): unknown, anticipate therapy at next level of care  Rehab Potential/Prognosis, Swallowing: good, to achieve stated therapy goals  Therapy Frequency (Swallow): 5 days per week  Predicted Duration Therapy Intervention (Days): until discharge                         Plan of Care Reviewed With: patient  Progress: no change    SLP GOALS     Row Name 03/03/21 1100             Oral Nutrition/Hydration Goal 1 (SLP)    Oral Nutrition/Hydration Goal 1, SLP  LTG: Pt will return to some form of po diet w/o overt s/s of aspiration given no cues w/ 100% acc  -CJ      Time Frame (Oral Nutrition/Hydration Goal 1, SLP)  by discharge  -CJ         Oral Nutrition/Hydration Goal 2 (SLP)    Oral Nutrition/Hydration Goal 2, SLP  STG: Pt will accept therapeutic trials of ice chips, thin water w/o overt s/s of aspiration given no cues w/ 80% acc to determine pt readiness for repeat instrumental evaluatoin  -CJ      Time Frame (Oral Nutrition/Hydration Goal 2, SLP)  short term goal (STG)  -CJ         Lingual Strengthening  Goal 1 (SLP)    Activity (Lingual Strengthening Goal 1, SLP)  increase lingual tone/sensation/control/coordination/movement;increase tongue back strength  -CJ      Increase Lingual Tone/Sensation/Control/Coordination/Movement  swallow trials;lingual resistance exercises  -CJ      Increase Tongue Back Strength  swallow trials;lingual resistance exercises  -CJ      [place]/Accuracy (Lingual Strengthening Goal 1, SLP)  with minimal cues (75-90% accuracy)  -CJ      Time Frame (Lingual Strengthening Goal 1, SLP)  short term goal (STG)  -CJ         Pharyngeal Strengthening Exercise Goal 1 (SLP)    Activity (Pharyngeal Strengthening Goal 1, SLP)  increase timing;increase superior movement of the hyolaryngeal complex;increase anterior movement of the hyolaryngeal complex;increase closure at entrance to airway/closure of airway at glottis;increase squeeze/positive pressure generation;increase tongue base retraction  -CJ      Increase Timing  prepping - 3 second prep or suck swallow or 3-step swallow  -CJ      Increase Anterior Movement of the Hyolaryngeal Complex  chin tuck against resistance (CTAR)  -CJ      Increase Closure at Entrance to Airway/Closure of Airway at Glottis  supraglottic swallow  -CJ      Increase Squeeze/Positive Pressure Generation  hard effortful swallow  -CJ      Increase Tongue Base Retraction  eron  -CJ      [place]/Accuracy (Pharyngeal Strengthening Goal 1, SLP)  with minimal cues (75-90% accuracy)  -CJ      Time Frame (Pharyngeal Strengthening Goal 1, SLP)  short term goal (STG)  -        User Key  (r) = Recorded By, (t) = Taken By, (c) = Cosigned By    Initials Name Provider Type    Eloise Pollock MS CCC-SLP Speech and Language Pathologist             Time Calculation:   Time Calculation- SLP     Row Name 03/03/21 1439             Time Calculation- SLP    SLP Start Time  1100  -      SLP Received On  03/03/21  -        User Key  (r) = Recorded By, (t) = Taken By, (c) =  Cosigned By    Initials Name Provider Type    Eloise Pollock, MS CCC-SLP Speech and Language Pathologist          Therapy Charges for Today     Code Description Service Date Service Provider Modifiers Qty    [id number] HC ST FIBEROPTIC ENDO EVAL SWALL 6 3/3/2021 Eloise Kidd, MS CCC-SLP GN 1        Patient was not wearing a face mask and did exhibit coughing during this therapy encounter.  Procedure performed was aerosolizing, involved close contact (within 6 feet for at least 15 minutes or longer), and involved contact with infectious secretions or specimens.  Therapist used appropriate personal protective equipment including gloves, standard procedure mask, eye protection, gown and N95 mask.  Appropriate PPE was worn during the entire therapy session.  Hand hygiene was completed before and after therapy session.          Eloise Kidd MS CCC-SLP  3/3/2021

## 2021-03-03 NOTE — PAYOR COMM NOTE
"  Kerry Murray RN Utilization Review [phone]  Fax # [phone]  Ref # [id number]        Elvia Montero (45 y.o. Female)     Date of Birth Social Security Number Address Home Phone MRN    1975  [id number]  [facility] [address] [phone] [id number]    Anglican Marital Status          Spiritism        Admission Date Admission Type Admitting Provider Attending Provider Department, Room/Bed    2/21/21 Emergency Eyad Obrien MD Tzouanakis, Alexander E, MD [facility] 2A ICU, N218/1    Discharge Date Discharge Disposition Discharge Destination                       Attending Provider: Ramon Shankar MD    Allergies: No Known Allergies    Isolation: None   Infection: None   Code Status: CPR    Ht: 170.2 cm (67\")   Wt: 99.2 kg (218 lb 11.1 oz)    Admission Cmt: None   Principal Problem: Alcohol withdrawal [F10.239]                 Active Insurance as of 2/21/2021     Primary Coverage     Payor Plan Insurance Group Employer/Plan Group    [place] [id number]     Payor Plan Address Payor Plan Phone Number Payor Plan Fax Number Effective Dates    [employer] [address] [address] [phone]  1/1/2021 - None Entered    [facility] [id number]       Subscriber Name Subscriber Birth Date Member ID       ELVIA MONTERO 1975 [id number]                 Emergency Contacts      (Rel.) Home Phone Work Phone Mobile Phone    Dewayne Montero (Son) -- -- [phone]    Abbie Carey (Mother) -- -- [phone]            Vital Signs (last day)     Date/Time   Temp   Temp src   Pulse   Resp   BP   Patient Position   SpO2    03/03/21 0941   --   --   97   --   --   --   --    03/03/21 0900   --   --   96   --   --   --   93    03/03/21 0827   97.5 (36.4)   Oral   98   20   (!) 150/102   Lying   --    03/03/21 0500   --   --   96   --   160/91   --   95    03/03/21 0400   98.6 (37)   Axillary   95   20   148/99   Lying   98 "    03/03/21 0300   --   --   95   --   (!) 156/105   --   99    03/03/21 0200   --   --   105   --   144/97   --   91    03/03/21 0000   98.8 (37.1)   Axillary   96   20   134/94   Lying   95    03/02/21 2300   --   --   101   --   128/93   --   93    03/02/21 2200   --   --   102   --   130/97   --   94    03/02/21 2100   --   --   98   --   145/100   --   93    03/02/21 2000   98.6 (37)   Oral   108   20   (!) 161/115   Lying   92    03/02/21 1800   --   --   107   18   147/93   --   95    03/02/21 1700   --   --   109   --   149/92   --   97    03/02/21 1600   99.6 (37.6)   Axillary   110   18   (!) 152/103   --   93    03/02/21 1506   --   --   --   --   (!) 154/101   --   --    03/02/21 1407   --   --   (!) 121   --   143/100   --   --    03/02/21 1400   --   --   (!) 125   16   --   --   90    03/02/21 1300   --   --   108   --   --   --   93    03/02/21 1200   100 (37.8)   Axillary   101   18   140/67   --   95    03/02/21 1149   --   --   101   --   --   --   95    03/02/21 1100   --   --   103   --   143/77   --   93    03/02/21 1000   (!) 100.9 (38.3)   --   (!) 125   18   174/87   --   90    03/02/21 0900   --   --   93   --   119/60   --   90    03/02/21 0800   100 (37.8)   Axillary   93   18   131/82   Lying   94    03/02/21 0700   --   --   96   --   137/68   --   92    03/02/21 0612   97.8 (36.6)   Oral   109   18   --   Lying   96    03/02/21 0600   --   --   116   16   (!) 156/118   --   96    03/02/21 0500   --   --   111   16   174/80   --   93    03/02/21 0400   --   --   113   16   --   --   (!) 89    03/02/21 0300   --   --   --   --   162/95   Lying   92    03/02/21 0212   --   --   111   16   --   --   93    03/02/21 0210   --   --   110   --   152/80   --   93    03/02/21 0200   --   --   111   16   --   --   --    03/02/21 0100   --   --   98   16   161/89   Lying   92    03/02/21 0000   98.1 (36.7)   Axillary   98   16   158/90   Lying   94              Current Facility-Administered  Medications   Medication Dose Route Frequency Provider Last Rate Last Admin   • acetaminophen (TYLENOL) 160 MG/5ML solution 650 mg  650 mg Oral Q6H PRN Randall Moore MD   649.6 mg at 03/03/21 0549   • carvedilol (COREG) tablet 3.125 mg  3.125 mg Nasogastric BID With Meals LisandroKoby ramos MD   3.125 mg at 03/03/21 0940   • dexmedetomidine (PRECEDEX) 1000 mcg in 250 mL NS infusion  0.2-1.5 mcg/kg/hr Intravenous Titrated Randall Moore MD   Stopped at 03/02/21 1200   • diazePAM (VALIUM) tablet 30 mg  30 mg Nasogastric Q6H Ramon Shankar MD   30 mg at 03/03/21 0515   • docusate sodium (COLACE) liquid 100 mg  100 mg Nasogastric BID Randall Moore MD   100 mg at 03/03/21 0940   • enoxaparin (LOVENOX) syringe 40 mg  40 mg Subcutaneous Q24H Mike Portillo APRN   40 mg at 03/03/21 0940   • folic acid (FOLVITE) tablet 1 mg  1 mg Nasogastric Daily Randall Moore MD   1 mg at 03/03/21 0940   • hydroCHLOROthiazide (HYDRODIURIL) oral 12.5 mg  12.5 mg Nasogastric Daily Miguel Angel Stover MD   12.5 mg at 03/03/21 0941   • influenza vac split quad (FLUZONE,FLUARIX,AFLURIA,FLULAVAL) injection 0.5 mL  0.5 mL Intramuscular During Hospitalization Jerri Youssef, APRMRYNA       • insulin regular (humuLIN R,novoLIN R) injection 0-7 Units  0-7 Units Subcutaneous Q6H Eyad Obrien MD   2 Units at 03/03/21 0515   • ipratropium-albuterol (DUO-NEB) nebulizer solution 3 mL  3 mL Nebulization Q6H PRN Randall Moore MD       • labetalol (NORMODYNE,TRANDATE) injection 20 mg  20 mg Intravenous Q4H PRN Randall Moore MD   20 mg at 02/26/21 2028   • lansoprazole (FIRST) oral suspension 30 mg  30 mg Nasogastric BID AC Randall Moore MD   30 mg at 03/03/21 0635   • levothyroxine (SYNTHROID, LEVOTHROID) tablet 88 mcg  88 mcg Nasogastric Q AM Randall Moore MD   88 mcg at 03/03/21 0514   • LORazepam (ATIVAN) injection 2 mg   2 mg Intravenous Q1H PRN Randall Moore MD   2 mg at 03/03/21 0149   • LORazepam (ATIVAN) injection 4 mg  4 mg Intravenous Q1H PRN Randall Moore MD   4 mg at 03/02/21 0540   • Magnesium Sulfate 4 gram infusion- Mg 1.6-1.9 mg/dL  4 g Intravenous PRN Randall Moore MD 25 mL/hr at 02/24/21 0945 4 g at 02/24/21 0945   • melatonin tablet 10 mg  10 mg Nasogastric Nightly Randall Moore MD   10 mg at 03/02/21 2058   • multivitamin chewable tablet 1 tablet  1 tablet Nasogastric Daily Randall Moore MD   1 tablet at 03/02/21 0805   • naloxone (NARCAN) injection 0.1 mg  0.1 mg Intravenous Q5 Min PRN Mike Portillo APRN       • ondansetron (ZOFRAN) injection 4 mg  4 mg Intravenous Q6H PRN Randall Moore MD   4 mg at 03/03/21 0126   • potassium & sodium phosphates (PHOS-NAK) 280-160-250 MG packet - for Phosphorus less than 1.25 mg/dL  2 packet Oral Q6H PRN Ramon Shankar MD        Or   • potassium & sodium phosphates (PHOS-NAK) 280-160-250 MG packet - for Phosphorus 1.25 - 2.5 mg/dL  2 packet Oral Q6H PRN Ramon Shankar MD   2 packet at 03/02/21 1136   • potassium chloride (KLOR-CON) packet 40 mEq  40 mEq Nasogastric PRN Randall Moore MD   40 mEq at 03/03/21 0635    Or   • potassium chloride 10 mEq in 100 mL IVPB  10 mEq Intravenous Q1H PRN Randall Moore MD       • QUEtiapine (SEROquel) tablet 200 mg  200 mg Nasogastric Nightly Randall Moore MD   200 mg at 03/02/21 2058   • QUEtiapine (SEROquel) tablet 200 mg  200 mg Nasogastric Daily Randall Moore MD   200 mg at 03/03/21 0941   • sennosides (SENOKOT) 8.8 MG/5ML syrup 10 mL  10 mL Nasogastric BID Randall Moore MD   10 mL at 03/03/21 0940   • sodium chloride 0.9 % flush 10 mL  10 mL Intravenous Q12H Mike Portillo APRN   10 mL at 03/03/21 0941   • sodium chloride 0.9 % flush 10 mL  10 mL  Intravenous PRN Mike Portillo APRN       • thiamine (VITAMIN B-1) tablet 100 mg  100 mg Nasogastric Daily Randall Moore MD   100 mg at 03/03/21 0941   • ziprasidone (GEODON) injection 20 mg  20 mg Intramuscular Q6H PRN Randall Moore MD   20 mg at 02/27/21 1215     Operative/Procedure Notes (last 24 hours) (Notes from 03/02/21 0942 through 03/03/21 0942)    No notes of this type exist for this encounter.            Physician Progress Notes (last 24 hours) (Notes from 03/02/21 0942 through 03/03/21 0942)      Ramon Shankar MD at 03/02/21 1359          INTENSIVIST NOTE     Hospital Day: 9    Ms. Elvia Montero, 45 y.o. female is followed for:   Suicide attempt and alcohol withdrawal       SUBJECTIVE     45-year-old female with past history of anxiety/depression/bipolar disorder and alcohol abuse admitted on 2/21 after attempted suicide where she ingested a partial bottle of [employer] and a bottle of wine.  She underwent EGD on 2/22 showing grade C esophagitis with minimal injury and inflamed uvula.  Her hospital course has been characterized by alcohol withdrawal and agitated delirium.  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    Interval history:    Increasing agitation last night requiring resumption of Precedex.  Remains on high-dose Seroquel and Valium.  Low-grade fever.  No infiltrates by chest x-ray.    The patient's relevant past medical, surgical and social history were reviewed and updated in Epic as appropriate.       OBJECTIVE     Vital Sign Min/Max for last 24 hours  Temp  Min: 97.8 °F (36.6 °C)  Max: 100.9 °F (38.3 °C)   BP  Min: 119/60  Max: 174/87   Pulse  Min: 93  Max: 125   Resp  Min: 16  Max: 20   SpO2  Min: 89 %  Max: 96 %   No data recorded   No data recorded      Intake/Output Summary (Last 24 hours) at 3/2/2021 1400  Last data filed at 3/2/2021 1147  Gross per 24 hour   Intake 2287.3 ml   Output 1225 ml  "  Net 1062.3 ml      Flowsheet Rows      First Filed Value   Admission Height  170.2 cm (67\") Documented at 02/21/2021 1759   Admission Weight  81.6 kg (180 lb) Documented at 02/21/2021 1759             02/23/21  0538 02/26/21  0600 02/27/21 2034   Weight: 97.9 kg (215 lb 13.3 oz) 100 kg (220 lb 10.9 oz) 99.2 kg (218 lb 11.1 oz)            Objective:  General Appearance:  In no acute distress.    Vital signs: (most recent): Blood pressure 140/67, pulse 101, temperature 100 °F (37.8 °C), temperature source Axillary, resp. rate 18, height 170.2 cm (67\"), weight 99.2 kg (218 lb 11.1 oz), last menstrual period 02/14/2021, SpO2 95 %.    HEENT: Normal HEENT exam.  (NG tube in place)    Lungs:  Normal effort and normal respiratory rate.  Breath sounds clear to auscultation.  She is not in respiratory distress.  No rales, wheezes or rhonchi.    Heart: Normal rate.  Regular rhythm.  S1 normal and S2 normal.  No murmur, gallop or friction rub.   Chest: Symmetric chest wall expansion.   Abdomen: Abdomen is soft and non-distended.  Bowel sounds are normal.   There is no abdominal tenderness.   There is no mass. There is no splenomegaly. There is no hepatomegaly.   Extremities: There is no deformity or dependent edema.    Neurological: Patient is alert.  (Confused).    Pupils:  Pupils are equal, round, and reactive to light.    Skin:  Warm and dry.              I reviewed the patient's new clinical results.  I reviewed the patient's new imaging results/reports including actual images and agree with reports.      Chest X-Ray: No consolidation.    INFUSIONS  Dexmedetomidine HCl in NaCl, 0.2-1.5 mcg/kg/hr, Last Rate: 0.2 mcg/kg/hr (03/02/21 1147)        Results from last 7 days   Lab Units 03/02/21  0543 03/01/21  0421 02/28/21  0707   WBC 10*3/mm3 13.38* 7.90 5.51   HEMOGLOBIN g/dL 11.0* 11.4* 10.3*   HEMATOCRIT % 39.8 39.2 36.8   PLATELETS 10*3/mm3 307 669 298     Results from last 7 days   Lab Units 03/02/21  1157 " 03/02/21  0543 03/01/21 2014 03/01/21  0421 02/28/21 2024 02/28/21  0707   SODIUM mmol/L 139 142 139   < > 141 139   < > 137   POTASSIUM mmol/L  --  4.4  --   --  3.7 5.9*  --  3.5   CHLORIDE mmol/L  --  101  --   --  101  --   --  103   CO2 mmol/L  --  25.0  --   --  27.0  --   --  21.0*   BUN mg/dL  --  13  --   --  12  --   --  16   GLUCOSE mg/dL  --  181*  --   --  154*  --   --  173*   CREATININE mg/dL  --  0.67  --   --  0.81  --   --  0.76   MAGNESIUM mg/dL  --  2.3  --   --  2.4  --   --  2.0   CALCIUM mg/dL  --  10.2  --   --  10.2  --   --  9.2    < > = values in this interval not displayed.                 [facility] Ventilation:      I reviewed the patient's medications.    Assessment/Plan   ASSESSMENT/PLAN     Active Hospital Problems    Diagnosis   • **Alcohol withdrawal   • Nephrogenic diabetes insipidus (CMS/HCC)   • Suicide attempt (CMS/[facility])   • Refusal of blood transfusions as patient is J[place]   • Toxic effect of caustic substance     Added automatically from request for surgery [id number]         1. Alcohol withdrawal: Agitated delirium on high-dose Valium and high-dose Seroquel.  Became increasingly agitated last night requiring resumption of Precedex.  2. Suicide attempt with toxic ingestion as described: Upper GI injury mild per upper endoscopy on admission  3. Anxiety/depression/bipolar disorder  4. 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 Dysphagia    1. Additional DDAVP per renal.  Held today.  2. HCTZ  3. High-dose Valium and Seroquel  4. Have needed to reinstitute Precedex.  Wean Precedex as tolerated  5. Sliding-scale insulin  6. Lovenox for DVT prophylaxis  7. Tube feeds need to continue.  Not able to pass a dysphagia evaluation at this point.     I discussed the patient's findings and my recommendations with patient, family and nursing staff     Plan of care and goals reviewed with multidisciplinary team at daily rounds.    High level of risk due to:  illness  with threat to life or bodily function and drugs requiring intensive monitoring for toxicity.    Ramon Shankar MD  Pulmonary and Critical Care Medicine  03/02/21 14:00 EST         Electronically signed by Ramon Shankar MD at 03/02/21 1409       Consult Notes (last 24 hours) (Notes from 03/02/21 0942 through 03/03/21 0942)    No notes of this type exist for this encounter.

## 2021-03-03 NOTE — PROGRESS NOTES
"   LOS: 10 days    Patient Care Team:  Provider, No Known as PCP - General  Provider, No Known as PCP - Family Medicine    Chief Complaint: Suicide attempt  Consulted for diabetes insipidus, hyper natremia, chronic lithium use  Subjective     Interval History:   No acute events overnight. No new complaints. Right hand swollen.   Review of Systems:   Alert today. Denies any SOA , CP , fever, rash, n/v. Abdominal pain.     Objective     Vital Sign Min/Max for last 24 hours  Temp  Min: 97.5 °F (36.4 °C)  Max: 100.9 °F (38.3 °C)   BP  Min: 128/93  Max: 174/87   Pulse  Min: 95  Max: 125   Resp  Min: 16  Max: 20   SpO2  Min: 90 %  Max: 99 %   Flow (L/min)  Min: 1  Max: 2   No data recorded     Flowsheet Rows      First Filed Value   Admission Height  170.2 cm (67\") Documented at 02/21/2021 1759   Admission Weight  81.6 kg (180 lb) Documented at 02/21/2021 1759          No intake/output data recorded.  I/O last 3 completed shifts:  In: 3410.6 [I.V.:222.6; Other:1180; NG/GT:2008]  Out: 1750 [Urine:1750]    Physical Exam:  General Appearance: Alert, NAD  Eyes: Pupils are equal.  EOMI  Neck: Supple no JVD.  Lungs: Clear auscultation,  nonlabored.  Heart: No gallop, murmur, rub, RRR.  Abdomen: Soft, positive bowel sounds, no organomegaly.  Extremities: No edema, no cyanosis.  Neuro: Awake, alert, no focal deficit.  Skin: Warm and dry.        WBC WBC   Date Value Ref Range Status   03/03/2021 18.38 (H) 3.40 - 10.80 10*3/mm3 Final   03/02/2021 13.38 (H) 3.40 - 10.80 10*3/mm3 Final   03/01/2021 7.90 3.40 - 10.80 10*3/mm3 Final      HGB Hemoglobin   Date Value Ref Range Status   03/03/2021 10.2 (L) 12.0 - 15.9 g/dL Final   03/02/2021 11.0 (L) 12.0 - 15.9 g/dL Final   03/01/2021 11.4 (L) 12.0 - 15.9 g/dL Final      HCT Hematocrit   Date Value Ref Range Status   03/03/2021 36.9 34.0 - 46.6 % Final   03/02/2021 39.8 34.0 - 46.6 % Final   03/01/2021 39.2 34.0 - 46.6 % Final      Platlets No results found for: LABPLAT   MCV MCV   Date " Value Ref Range Status   03/03/2021 88.3 79.0 - 97.0 fL Final   03/02/2021 87.9 79.0 - 97.0 fL Final   03/01/2021 85.4 79.0 - 97.0 fL Final          Sodium Sodium   Date Value Ref Range Status   03/03/2021 139 136 - 145 mmol/L Final   03/02/2021 140 136 - 145 mmol/L Final   03/02/2021 139 136 - 145 mmol/L Final   03/02/2021 142 136 - 145 mmol/L Final   03/01/2021 139 136 - 145 mmol/L Final   03/01/2021 139 136 - 145 mmol/L Final   03/01/2021 141 136 - 145 mmol/L Final   02/28/2021 139 136 - 145 mmol/L Final   02/28/2021 139 136 - 145 mmol/L Final      Potassium Potassium   Date Value Ref Range Status   03/03/2021 3.2 (L) 3.5 - 5.2 mmol/L Final   03/02/2021 4.4 3.5 - 5.2 mmol/L Final     Comment:     Slight hemolysis detected by analyzer. Results may be affected.   03/01/2021 3.7 3.5 - 5.2 mmol/L Final   02/28/2021 5.9 (H) 3.5 - 5.2 mmol/L Final     Comment:     Specimen hemolyzed.  Results may be affected.      Chloride Chloride   Date Value Ref Range Status   03/03/2021 97 (L) 98 - 107 mmol/L Final   03/02/2021 101 98 - 107 mmol/L Final   03/01/2021 101 98 - 107 mmol/L Final      CO2 CO2   Date Value Ref Range Status   03/03/2021 29.0 22.0 - 29.0 mmol/L Final   03/02/2021 25.0 22.0 - 29.0 mmol/L Final   03/01/2021 27.0 22.0 - 29.0 mmol/L Final      BUN BUN   Date Value Ref Range Status   03/03/2021 18 6 - 20 mg/dL Final   03/02/2021 13 6 - 20 mg/dL Final   03/01/2021 12 6 - 20 mg/dL Final      Creatinine Creatinine   Date Value Ref Range Status   03/03/2021 0.71 0.57 - 1.00 mg/dL Final   03/02/2021 0.67 0.57 - 1.00 mg/dL Final   03/01/2021 0.81 0.57 - 1.00 mg/dL Final      Calcium Calcium   Date Value Ref Range Status   03/03/2021 9.8 8.6 - 10.5 mg/dL Final   03/02/2021 10.2 8.6 - 10.5 mg/dL Final   03/01/2021 10.2 8.6 - 10.5 mg/dL Final      PO4 No results found for: CAPO4   Albumin Albumin   Date Value Ref Range Status   03/03/2021 4.10 3.50 - 5.20 g/dL Final   03/02/2021 4.40 3.50 - 5.20 g/dL Final   03/01/2021  4.20 3.50 - 5.20 g/dL Final      Magnesium Magnesium   Date Value Ref Range Status   03/03/2021 2.4 1.6 - 2.6 mg/dL Final   03/02/2021 2.3 1.6 - 2.6 mg/dL Final   03/01/2021 2.4 1.6 - 2.6 mg/dL Final      Uric Acid No results found for: URICACID        Results Review:     I reviewed the patient's new clinical results.    carvedilol, 3.125 mg, Nasogastric, BID With Meals  diazePAM, 30 mg, Nasogastric, Q6H  docusate sodium, 100 mg, Nasogastric, BID  enoxaparin, 40 mg, Subcutaneous, Q24H  folic acid, 1 mg, Nasogastric, Daily  hydroCHLOROthiazide, 12.5 mg, Nasogastric, Daily  insulin regular, 0-7 Units, Subcutaneous, Q6H  lansoprazole, 30 mg, Nasogastric, BID AC  levothyroxine, 88 mcg, Nasogastric, Q AM  melatonin, 10 mg, Nasogastric, Nightly  multivitamin chewable, 1 tablet, Nasogastric, Daily  QUEtiapine, 200 mg, Nasogastric, Nightly  QUEtiapine, 200 mg, Nasogastric, Daily  sennosides, 10 mL, Nasogastric, BID  sodium chloride, 10 mL, Intravenous, Q12H  thiamine, 100 mg, Nasogastric, Daily      Dexmedetomidine HCl in NaCl, 0.2-1.5 mcg/kg/hr, Last Rate: Stopped (03/02/21 1200)        Medication Review: As noted above    Assessment/Plan       Alcohol withdrawal    Suicide attempt (CMS/[facility])    Refusal of blood transfusions as patient is [place]    Toxic effect of caustic substance    Nephrogenic diabetes insipidus (CMS/[facility])    1.  Polyuria: Secondary to osmotic diuresis - Urine Osm 668 with normal serum sodium at presentation suggestive of osmotic diuresis plus urine osm X 24 hr urine volume is greater than 1000 mosmole - confirming osmotic diuresis rather than Water diuresis. Patient was on lithium now off. Improving.   2.  Suicide attempt.  3.  HTN and tachycardia     Plan:  - Monitor I/O.UOP acceptable.   - Continue with HCTZ   - Will consider Indomethacin if no improvement.   - Continue with low solute diet. (low sodium and protein)   - Continue with coreg. If BP not controlled, can increase dose of  coreg    Case discussed with the medical staff and family at bedside.       Koby Mcmahon MD  03/03/21  09:19 EST

## 2021-03-03 NOTE — PLAN OF CARE
Problem: Adult Inpatient Plan of Care  Goal: Plan of Care Review  Outcome: Ongoing, Progressing  Flowsheets (Taken 3/3/2021 0610)  Outcome Summary: Pt has had a good shift. Resting through the night, up to BSC only a few times. Answers orientation questions appropriately, but still forgetful and hard to find her words at times. Pt restless at times but able to use distraction and calming measures. PRN Ativan used x 2 for a total of 4mg this shift, successful. Pt still c/o nausea at times when restless, PRN Zofran effective. Able to keep Precedex off. TF tolerated, residuals 100-250mL. CIWA scores between 8-10. Pt moving well when getting OOB. Pt does have a frequent cough and needs assistance suctioning. Sputum has become thicker as the shift has continued. UOP 350ml + 1 large incontinent occurence this shift. No BM. Right hand is more swollen than left and slightly dusky/reddened, no worse than previous shift, strong radial pulse present. VSS. Afebrile. Will continue to monitor.   Goal Outcome Evaluation:        Outcome Summary: Pt has had a good shift. Resting through the night, up to BSC only a few times. Pt restless at times but able to use distraction and calming measures. PRN Ativan used x 2 for a total of 4mg this shift, successful. Pt still c/o nausea at times when restless, PRN Zofran effective. Able to keep Precedex off. TF tolerated, residuals 100-250mL. CIWA scores between 8-10. Pt moving well when getting OOB. Pt does have a frequent cough and needs assistance suctioning. Sputum has become thicker as the shift has continued. UOP 350ml + 1 large incontinent occurence this shift. No BM. Right hand is more swollen than left and slightly dusky/reddened, no worse than previous shift, strong radial pulse present. VSS. Afebrile. Will continue to monitor.

## 2021-03-03 NOTE — PLAN OF CARE
Goal Outcome Evaluation:  Pt was alert and oriented majority of shift. NG large bore tube was replaced with a Keofeed due to swelling assess during fees. Solumedrol given x1. K replaced, recheck 4.4. TF off from 1400-end of shift due to new tube placement, advancement  and difficulty obtaining xray. Pt remains on 1-2L NC this shift. Up to chair several hours. UOP lg occurrence x1 pt felt bladder fullness around 1500, bladder scanned for 575ml, I/O cath for 1050ml, no BM continued bowel regimen. R arm/hand remains swollen, and red. Cap refill intact and +2 radial pulse remains. Physician made aware in interdisciplinary rounds.

## 2021-03-03 NOTE — PROGRESS NOTES
Clinical Nutrition     Nutrition Support Assessment  Reason for Visit:   ILEANA MCCORMACK      Patient Name: Elvia Montero  YOB: 1975  MRN: [id number]  Date of Encounter: 03/03/21 08:49 EST  Admission date: 2/21/2021      Nutrition Assessment   Assessment   Suicide Attempt s/p ingestion 1/2 cup Dora Estrella, bottle of wine, klonopin, seroquel  Caustic Esophagitis  Etoh w/d  Hypernatremia- resolved,  suspected due to diabetes insipidus    s/p EGD 2-22-21: inflammation of the uvula, grade c esophagitis, minimal injury, normal stomach, normal duodenum    PMH: She  has a past medical history of Anxiety, Bipolar 1 disorder (CMS/HCC), Depression, Disease of thyroid gland, Esophagitis, Gastroparesis, GERD (gastroesophageal reflux disease), and Obsessive compulsive disorder (2020).   PSxH: She  has a past surgical history that includes Neck surgery and Esophagogastroduodenoscopy (N/A, 2/22/2021).     Substance abuse: Etoh    Reported/Observed/Food/Nutrition Related History:     Pt resting in bed, on room air, much more alert today, seems in good spirits, wants to know when she will have dysphagia eval    Per RN: pt tolerating TF    Anthropometrics     Height: 67in  Weight: 215lb  BMI: 34  Obese Class I: 30-34.9kg/m2    Labs reviewed     Results from last 7 days   Lab Units 03/03/21  0331 03/02/21 2022 03/02/21  1157 03/02/21  0543  03/01/21  0421  02/28/21  0707  02/27/21  1151   GLUCOSE mg/dL 171*  --   --  181*  --  154*  --  173*  --  133*   BUN mg/dL 18  --   --  13  --  12  --  16  --  15   CREATININE mg/dL 0.71  --   --  0.67  --  0.81  --  0.76  --  0.77   SODIUM mmol/L 139 140 139 142   < > 141   < > 137   < > 144   CHLORIDE mmol/L 97*  --   --  101  --  101  --  103  --  111*   POTASSIUM mmol/L 3.2*  --   --  4.4  --  3.7   < > 3.5  --  3.9   PHOSPHORUS mg/dL 3.0 2.9  --  2.3*  --  3.4  --  4.4  --  3.6   MAGNESIUM mg/dL 2.4  --   --  2.3  --  2.4  --  2.0  --  2.0   ALT (SGPT) U/L  --   --    --   --   --  53*  --  44*  --  37*    < > = values in this interval not displayed.     Results from last 7 days   Lab Units 03/03/21  0331 03/02/21  0543 03/01/21  0421   ALBUMIN g/dL 4.10 4.40 4.20   PREALBUMIN mg/dL  --   --  30.0   CRP mg/dL  --   --  10.96*   TRIGLYCERIDES mg/dL  --   --  251*       Results from last 7 days   Lab Units 03/03/21  0502 03/02/21  2301 03/02/21  1719 03/02/21  1127 03/02/21  0504 03/01/21  2301   GLUCOSE mg/dL 178* 231* 167* 179* 185* 169*     Lab Results   Lab Value Date/Time    HGBA1C 6.10 (H) 02/22/2021 0714       Medications reviewed   Pertinent: valium, lovenox, thiamine, folate, MVI, lansoprazole, colace, senokot, insulin      Intake/Ouptut 24 hrs (7:00AM - 6:59 AM)     Intake & Output (last day)       03/02 0701 - 03/03 0700 03/03 0701 - 03/04 0700    I.V. (mL/kg) 139.6 (1.4)     Other 655     NG/GT 1598     Total Intake(mL/kg) 2392.6 (24.1)     Urine (mL/kg/hr) 1150 (0.5)     Stool      Total Output 1150     Net +1242.6           Urine Unmeasured Occurrence 1 x                GI: abdomen distended, no bm since 3-1    SKIN: bruised    Needs Assessment       Height used 67in   Weight used ABW 215lb   IBW 123lb     Estimated need Method/Equation used Result    Energy/Calorie need  kcals/d 14kcal per kg ABW 1368kcal    MSJ ABW 1645kcal goal        Protein   g/day 1-1.2g protein per kg ABW 98-117g protein goal per Renal request for low solute TF     2g protein per kg IBW 123g protein                    Current Nutrition Prescription     PO: NPO Diet     EN: Peptamen AF @70ml/hr, free water @40ml/hr    Intake: 1138ml, 81% go volume    TF at goal will provide: 1400ml, 1680kcal, 106g protein, 2634ml free water, 45meq Na+    Nutrition Diagnosis     2-23-21, 3-3  Problem Inadequate energy intake   Etiology Per Clinical Status   Signs/Symptoms 81% goal volume EN   Status: improved    Nutrition Intervention   1.  Follow treatment progress    Hopefully pt will be able to participate in  SLP eval today    Goal:   General: Nutrition support treatment    EN/PN: Maintain EN    Monitoring/Evaluation:   Per protocol, I&O, Pertinent labs, Weight, Skin status, GI status, Symptoms, Hemodynamic stability    Abbie Coburn RD, CNSC  Time Spent: 30min

## 2021-03-03 NOTE — PROGRESS NOTES
"INTENSIVIST NOTE     Hospital Day: 10    Ms. Elvia Montero, 45 y.o. female is followed for:   Suicide attempt and alcohol withdrawal       SUBJECTIVE     45-year-old female with past history of anxiety/depression/bipolar disorder and alcohol abuse admitted on 2/21 after attempted suicide where she ingested a partial bottle of Mr. John and a bottle of wine.  She underwent EGD on 2/22 showing grade C esophagitis with minimal injury and inflamed uvula.  Her hospital course has been characterized by alcohol withdrawal and agitated delirium.  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    Interval history:    Mental status improved today.  Off Precedex.  Maximum temperature nine 9.6.  Fluid balance +1.6 L.  Failed FEES today with evidence of vocal cord swelling.  No stridor.    The patient's relevant past medical, surgical and social history were reviewed and updated in Epic as appropriate.       OBJECTIVE     Vital Sign Min/Max for last 24 hours  Temp  Min: 97.5 °F (36.4 °C)  Max: 99.6 °F (37.6 °C)   BP  Min: 128/88  Max: 161/115   Pulse  Min: 93  Max: 125   Resp  Min: 16  Max: 22   SpO2  Min: 90 %  Max: 99 %   No data recorded   No data recorded      Intake/Output Summary (Last 24 hours) at 3/3/2021 1309  Last data filed at 3/3/2021 1200  Gross per 24 hour   Intake 2779 ml   Output 1150 ml   Net 1629 ml      Flowsheet Rows      First Filed Value   Admission Height  170.2 cm (67\") Documented at 02/21/2021 1759   Admission Weight  81.6 kg (180 lb) Documented at 02/21/2021 1759             02/23/21  0538 02/26/21  0600 02/27/21 2034   Weight: 97.9 kg (215 lb 13.3 oz) 100 kg (220 lb 10.9 oz) 99.2 kg (218 lb 11.1 oz)            Objective:  General Appearance:  In no acute distress.    Vital signs: (most recent): Blood pressure 141/88, pulse 97, temperature 98 °F (36.7 °C), temperature source Axillary, resp. rate 18, height 170.2 cm (67\"), weight 99.2 kg (218 lb 11.1 oz), last " menstrual period 02/14/2021, SpO2 93 %.    HEENT: Normal HEENT exam.  (NG tube in place)    Lungs:  Normal effort and normal respiratory rate.  Breath sounds clear to auscultation.  She is not in respiratory distress.  No rales, wheezes or rhonchi.    Heart: Normal rate.  Regular rhythm.  S1 normal and S2 normal.  No murmur, gallop or friction rub.   Chest: Symmetric chest wall expansion.   Abdomen: Abdomen is soft and non-distended.  Bowel sounds are normal.   There is no abdominal tenderness.   There is no mass. There is no splenomegaly. There is no hepatomegaly.   Extremities: There is no deformity or dependent edema.    Neurological: Patient is alert.  (Confused).    Pupils:  Pupils are equal, round, and reactive to light.    Skin:  Warm and dry.              I reviewed the patient's new clinical results.  I reviewed the patient's new imaging results/reports including actual images and agree with reports.      Chest X-Ray: No additional    INFUSIONS  Dexmedetomidine HCl in NaCl, 0.2-1.5 mcg/kg/hr, Last Rate: Stopped (03/02/21 1200)        Results from last 7 days   Lab Units 03/03/21  0331 03/02/21 0543 03/01/21  0421   WBC 10*3/mm3 18.38* 13.38* 7.90   HEMOGLOBIN g/dL 10.2* 11.0* 11.4*   HEMATOCRIT % 36.9 39.8 39.2   PLATELETS 10*3/mm3 441 307 273     Results from last 7 days   Lab Units 03/03/21  1211 03/03/21  0331 03/02/21 2022 03/02/21  0543  03/01/21  0421   SODIUM mmol/L 137 139 140   < > 142   < > 141   POTASSIUM mmol/L  --  3.2*  --   --  4.4  --  3.7   CHLORIDE mmol/L  --  97*  --   --  101  --  101   CO2 mmol/L  --  29.0  --   --  25.0  --  27.0   BUN mg/dL  --  18  --   --  13  --  12   GLUCOSE mg/dL  --  171*  --   --  181*  --  154*   CREATININE mg/dL  --  0.71  --   --  0.67  --  0.81   MAGNESIUM mg/dL  --  2.4  --   --  2.3  --  2.4   CALCIUM mg/dL  --  9.8  --   --  10.2  --  10.2    < > = values in this interval not displayed.                 Mech Ventilation:      I reviewed the patient's  medications.    Assessment/Plan   ASSESSMENT/PLAN     Active Hospital Problems    Diagnosis   • **Alcohol withdrawal   • Nephrogenic diabetes insipidus (CMS/HCC)   • Suicide attempt (CMS/[facility])   • Refusal of blood transfusions as patient is Islam   • Toxic effect of caustic substance     Added automatically from request for surgery [id number]         1. Alcohol withdrawal: Agitated delirium on high-dose Valium and high-dose Seroquel.  Has required Precedex intermittently but off it currently.  Mental status seems improved this morning.  2. Suicide attempt with toxic ingestion as described: Upper GI injury mild per upper endoscopy on admission  3. Anxiety/depression/bipolar disorder  4. 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 Dysphagia: Failed FEES again today with swollen vocal cords    1. Steroids x1 for vocal cord edema though no respiratory compromise or stridor.  2. HCTZ  3. High-dose Valium and Seroquel  4. Will reduce scheduled medications if does not need prn Precedex or Ativan over the next 24 hours.  5. Sliding-scale insulin  6. Lovenox for DVT prophylaxis  7. Tube feeds need to continue.  Not able to pass a dysphagia evaluation at this point.     I discussed the patient's findings and my recommendations with patient and nursing staff     Plan of care and goals reviewed with multidisciplinary team at daily rounds.    High level of risk due to:  illness with threat to life or bodily function.    Ramon Shankar MD  Pulmonary and Critical Care Medicine  03/03/21 13:09 EST

## 2021-03-04 ENCOUNTER — APPOINTMENT (OUTPATIENT)
Dept: CARDIOLOGY | Facility: HOSPITAL | Age: 46
End: 2021-03-04

## 2021-03-04 LAB
ALBUMIN SERPL-MCNC: 3.9 G/DL (ref 3.5–5.2)
ANION GAP SERPL CALCULATED.3IONS-SCNC: 11 MMOL/L (ref 5–15)
BASOPHILS # BLD AUTO: 0.03 10*3/MM3 (ref 0–0.2)
BASOPHILS NFR BLD AUTO: 0.2 % (ref 0–1.5)
BUN SERPL-MCNC: 19 MG/DL (ref 6–20)
BUN/CREAT SERPL: 31.1 (ref 7–25)
CALCIUM SPEC-SCNC: 10 MG/DL (ref 8.6–10.5)
CHLORIDE SERPL-SCNC: 99 MMOL/L (ref 98–107)
CO2 SERPL-SCNC: 27 MMOL/L (ref 22–29)
CREAT SERPL-MCNC: 0.61 MG/DL (ref 0.57–1)
DEPRECATED RDW RBC AUTO: 71.6 FL (ref 37–54)
EOSINOPHIL # BLD AUTO: 0.01 10*3/MM3 (ref 0–0.4)
EOSINOPHIL NFR BLD AUTO: 0.1 % (ref 0.3–6.2)
ERYTHROCYTE [DISTWIDTH] IN BLOOD BY AUTOMATED COUNT: 22.9 % (ref 12.3–15.4)
GFR SERPL CREATININE-BSD FRML MDRD: 105 ML/MIN/1.73
GLUCOSE BLDC GLUCOMTR-MCNC: 167 MG/DL (ref 70–130)
GLUCOSE BLDC GLUCOMTR-MCNC: 247 MG/DL (ref 70–130)
GLUCOSE SERPL-MCNC: 266 MG/DL (ref 65–99)
HCT VFR BLD AUTO: 34.5 % (ref 34–46.6)
HGB BLD-MCNC: 9.6 G/DL (ref 12–15.9)
IMM GRANULOCYTES # BLD AUTO: 0.13 10*3/MM3 (ref 0–0.05)
IMM GRANULOCYTES NFR BLD AUTO: 0.9 % (ref 0–0.5)
LYMPHOCYTES # BLD AUTO: 1.19 10*3/MM3 (ref 0.7–3.1)
LYMPHOCYTES NFR BLD AUTO: 8.2 % (ref 19.6–45.3)
MAGNESIUM SERPL-MCNC: 2.2 MG/DL (ref 1.6–2.6)
MCH RBC QN AUTO: 24.6 PG (ref 26.6–33)
MCHC RBC AUTO-ENTMCNC: 27.8 G/DL (ref 31.5–35.7)
MCV RBC AUTO: 88.2 FL (ref 79–97)
MONOCYTES # BLD AUTO: 0.49 10*3/MM3 (ref 0.1–0.9)
MONOCYTES NFR BLD AUTO: 3.4 % (ref 5–12)
NEUTROPHILS NFR BLD AUTO: 12.64 10*3/MM3 (ref 1.7–7)
NEUTROPHILS NFR BLD AUTO: 87.2 % (ref 42.7–76)
NRBC BLD AUTO-RTO: 0 /100 WBC (ref 0–0.2)
PHOSPHATE SERPL-MCNC: 2.7 MG/DL (ref 2.5–4.5)
PLATELET # BLD AUTO: 521 10*3/MM3 (ref 140–450)
PMV BLD AUTO: 10.7 FL (ref 6–12)
POTASSIUM SERPL-SCNC: 4.1 MMOL/L (ref 3.5–5.2)
RBC # BLD AUTO: 3.91 10*6/MM3 (ref 3.77–5.28)
SODIUM SERPL-SCNC: 137 MMOL/L (ref 136–145)
WBC # BLD AUTO: 14.49 10*3/MM3 (ref 3.4–10.8)

## 2021-03-04 PROCEDURE — 93971 EXTREMITY STUDY: CPT | Performed by: INTERNAL MEDICINE

## 2021-03-04 PROCEDURE — 25010000002 ENOXAPARIN PER 10 MG: Performed by: NURSE PRACTITIONER

## 2021-03-04 PROCEDURE — 80069 RENAL FUNCTION PANEL: CPT | Performed by: INTERNAL MEDICINE

## 2021-03-04 PROCEDURE — 93971 EXTREMITY STUDY: CPT

## 2021-03-04 PROCEDURE — 83735 ASSAY OF MAGNESIUM: CPT | Performed by: INTERNAL MEDICINE

## 2021-03-04 PROCEDURE — 63710000001 INSULIN REGULAR HUMAN PER 5 UNITS: Performed by: INTERNAL MEDICINE

## 2021-03-04 PROCEDURE — 82962 GLUCOSE BLOOD TEST: CPT

## 2021-03-04 PROCEDURE — 99232 SBSQ HOSP IP/OBS MODERATE 35: CPT | Performed by: INTERNAL MEDICINE

## 2021-03-04 PROCEDURE — 85025 COMPLETE CBC W/AUTO DIFF WBC: CPT | Performed by: INTERNAL MEDICINE

## 2021-03-04 RX ORDER — QUETIAPINE FUMARATE 100 MG/1
200 TABLET, FILM COATED ORAL NIGHTLY
Status: DISCONTINUED | OUTPATIENT
Start: 2021-03-04 | End: 2021-03-05

## 2021-03-04 RX ORDER — QUETIAPINE FUMARATE 100 MG/1
100 TABLET, FILM COATED ORAL NIGHTLY
Status: DISCONTINUED | OUTPATIENT
Start: 2021-03-04 | End: 2021-03-04

## 2021-03-04 RX ORDER — DIAZEPAM 5 MG/1
15 TABLET ORAL EVERY 6 HOURS
Status: DISCONTINUED | OUTPATIENT
Start: 2021-03-04 | End: 2021-03-05

## 2021-03-04 RX ORDER — QUETIAPINE FUMARATE 100 MG/1
100 TABLET, FILM COATED ORAL DAILY
Status: DISCONTINUED | OUTPATIENT
Start: 2021-03-05 | End: 2021-03-05

## 2021-03-04 RX ADMIN — LANSOPRAZOLE 30 MG: KIT at 08:10

## 2021-03-04 RX ADMIN — SENNOSIDES 10 ML: 8.8 LIQUID ORAL at 20:55

## 2021-03-04 RX ADMIN — DIAZEPAM 15 MG: 5 TABLET ORAL at 17:30

## 2021-03-04 RX ADMIN — QUETIAPINE FUMARATE 200 MG: 100 TABLET ORAL at 20:55

## 2021-03-04 RX ADMIN — ACETAMINOPHEN 650 MG: 160 SOLUTION ORAL at 17:29

## 2021-03-04 RX ADMIN — SENNOSIDES 10 ML: 8.8 LIQUID ORAL at 08:10

## 2021-03-04 RX ADMIN — DOCUSATE SODIUM 100 MG: 50 LIQUID ORAL at 20:55

## 2021-03-04 RX ADMIN — QUETIAPINE FUMARATE 100 MG: 100 TABLET ORAL at 10:13

## 2021-03-04 RX ADMIN — INSULIN HUMAN 2 UNITS: 100 INJECTION, SOLUTION PARENTERAL at 12:13

## 2021-03-04 RX ADMIN — DOCUSATE SODIUM 100 MG: 50 LIQUID ORAL at 08:10

## 2021-03-04 RX ADMIN — Medication 1 TABLET: at 08:10

## 2021-03-04 RX ADMIN — CARVEDILOL 3.12 MG: 3.12 TABLET, FILM COATED ORAL at 17:30

## 2021-03-04 RX ADMIN — SODIUM CHLORIDE, PRESERVATIVE FREE 10 ML: 5 INJECTION INTRAVENOUS at 08:11

## 2021-03-04 RX ADMIN — INSULIN HUMAN 3 UNITS: 100 INJECTION, SOLUTION PARENTERAL at 06:45

## 2021-03-04 RX ADMIN — LEVOTHYROXINE SODIUM 88 MCG: 88 TABLET ORAL at 06:45

## 2021-03-04 RX ADMIN — DIAZEPAM 15 MG: 5 TABLET ORAL at 12:14

## 2021-03-04 RX ADMIN — Medication 10 MG: at 20:56

## 2021-03-04 RX ADMIN — DIAZEPAM 15 MG: 5 TABLET ORAL at 00:04

## 2021-03-04 RX ADMIN — CARVEDILOL 3.12 MG: 3.12 TABLET, FILM COATED ORAL at 08:11

## 2021-03-04 RX ADMIN — HYDROCHLOROTHIAZIDE 12.5 MG: 12.5 CAPSULE ORAL at 08:10

## 2021-03-04 RX ADMIN — ENOXAPARIN SODIUM 40 MG: 40 INJECTION SUBCUTANEOUS at 08:12

## 2021-03-04 RX ADMIN — FOLIC ACID 1 MG: 1 TABLET ORAL at 08:10

## 2021-03-04 RX ADMIN — LANSOPRAZOLE 30 MG: KIT at 17:34

## 2021-03-04 RX ADMIN — THIAMINE HCL TAB 100 MG 100 MG: 100 TAB at 08:10

## 2021-03-04 NOTE — PROGRESS NOTES
"INTENSIVIST NOTE     Hospital Day: 11    Ms. Elvia Montero, 45 y.o. female is followed for:   Suicide attempt and alcohol withdrawal       SUBJECTIVE     45-year-old female with past history of anxiety/depression/bipolar disorder and alcohol abuse admitted on 2/21 after attempted suicide where she ingested a partial bottle of Mr. John and a bottle of wine.  She underwent EGD on 2/22 showing grade C esophagitis with minimal injury and inflamed uvula.  Her hospital course has been characterized by alcohol withdrawal and agitated delirium.  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    Interval history:    Mental status improved further today.  Mostly oriented on rounds.  Fluid balance even.  Afebrile.  Up to bedside commode with assistance.    The patient's relevant past medical, surgical and social history were reviewed and updated in Epic as appropriate.       OBJECTIVE     Vital Sign Min/Max for last 24 hours  Temp  Min: 97.5 °F (36.4 °C)  Max: 98.2 °F (36.8 °C)   BP  Min: 116/81  Max: 173/107   Pulse  Min: 77  Max: 100   Resp  Min: 16  Max: 20   SpO2  Min: 88 %  Max: 100 %   No data recorded   Weight  Min: 94.8 kg (208 lb 15.9 oz)  Max: 94.8 kg (208 lb 15.9 oz)      Intake/Output Summary (Last 24 hours) at 3/4/2021 1350  Last data filed at 3/4/2021 1200  Gross per 24 hour   Intake 1907 ml   Output 2000 ml   Net -93 ml      Flowsheet Rows      First Filed Value   Admission Height  170.2 cm (67\") Documented at 02/21/2021 1759   Admission Weight  81.6 kg (180 lb) Documented at 02/21/2021 1759             02/26/21  0600 02/27/21 2034 03/04/21  0612   Weight: 100 kg (220 lb 10.9 oz) 99.2 kg (218 lb 11.1 oz) 94.8 kg (208 lb 15.9 oz)            Objective:  General Appearance:  In no acute distress.    Vital signs: (most recent): Blood pressure 138/88, pulse 82, temperature 97.5 °F (36.4 °C), temperature source Oral, resp. rate 16, height 170.2 cm (67\"), weight 94.8 kg (208 lb " 15.9 oz), last menstrual period 02/14/2021, SpO2 100 %.    HEENT: Normal HEENT exam.  (Keofeed)    Lungs:  Normal effort and normal respiratory rate.  Breath sounds clear to auscultation.  She is not in respiratory distress.  No rales, wheezes or rhonchi.    Heart: Normal rate.  Regular rhythm.  S1 normal and S2 normal.  No murmur, gallop or friction rub.   Chest: Symmetric chest wall expansion.   Abdomen: Abdomen is soft and non-distended.  Bowel sounds are normal.   There is no abdominal tenderness.   There is no mass. There is no splenomegaly. There is no hepatomegaly.   Extremities: There is no deformity or dependent edema.    Neurological: Patient is alert.    Pupils:  Pupils are equal, round, and reactive to light.    Skin:  Warm and dry.              I reviewed the patient's new clinical results.  I reviewed the patient's new imaging results/reports including actual images and agree with reports.      Chest X-Ray: No additional    INFUSIONS       Results from last 7 days   Lab Units 03/04/21  0423 03/03/21  0331 03/02/21  0543   WBC 10*3/mm3 14.49* 18.38* 13.38*   HEMOGLOBIN g/dL 9.6* 10.2* 11.0*   HEMATOCRIT % 34.5 36.9 39.8   PLATELETS 10*3/mm3 521* 441 307     Results from last 7 days   Lab Units 03/04/21  0423 03/03/21  1558 03/03/21  1211 03/03/21  0331  03/02/21  0543   SODIUM mmol/L 137  --  137 139   < > 142   POTASSIUM mmol/L 4.1 4.4  --  3.2*  --  4.4   CHLORIDE mmol/L 99  --   --  97*  --  101   CO2 mmol/L 27.0  --   --  29.0  --  25.0   BUN mg/dL 19  --   --  18  --  13   GLUCOSE mg/dL 266*  --   --  171*  --  181*   CREATININE mg/dL 0.61  --   --  0.71  --  0.67   MAGNESIUM mg/dL 2.2  --   --  2.4  --  2.3   CALCIUM mg/dL 10.0  --   --  9.8  --  10.2    < > = values in this interval not displayed.                 Mech Ventilation:      I reviewed the patient's medications.    Assessment/Plan   ASSESSMENT/PLAN     Active Hospital Problems    Diagnosis   • **Alcohol withdrawal   • Nephrogenic  diabetes insipidus (CMS/HCC)   • Suicide attempt (CMS/[facility])   • Refusal of blood transfusions as patient is Hinduism   • Toxic effect of caustic substance     Added automatically from request for surgery [id number]         1. Alcohol withdrawal: Agitated delirium on high-dose Valium and high-dose Seroquel.  Mental status improved.  Has not required as needed medications over the last 24 hours so will reduce Valium and Seroquel doses..  2. Suicide attempt with toxic ingestion as described: Upper GI injury mild per upper endoscopy on admission  3. Anxiety/depression/bipolar disorder  4. 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 Dysphagia: Failed FEES 3/3 with swollen vocal cords  6. Swollen right upper extremity.  Will check ultrasound.    1. HCTZ  2. Reduce Valium and Seroquel dosages.  3. Right upper extremity Doppler  4. Sliding-scale insulin  5. Lovenox for DVT prophylaxis  6. Tube feeds need to continue.  Not able to pass a dysphagia evaluation at this point.  7. Okay to PCU     I discussed the patient's findings and my recommendations with patient and nursing staff     Plan of care and goals reviewed with multidisciplinary team at daily rounds.    .    Ramon Shankar MD  Pulmonary and Critical Care Medicine  03/04/21 13:50 EST

## 2021-03-04 NOTE — PLAN OF CARE
Problem: Adult Inpatient Plan of Care  Goal: Plan of Care Review  Outcome: Ongoing, Progressing  Flowsheets (Taken 3/4/2021 5923)  Outcome Summary: Pt has rested well throughout the night. UOP 500mL. CIWA 1. Pt has not reported any anxiety. Half of Valium dose given at midnight d/t pt being very drowsy. CPAP ordered d/t O2 sats dropping while sleeping, pt refused to wear. No complaints from pt this shift. VSS. Afebrile. TF restarted through keofeed, tolerating well. Will continue to monitor.   Goal Outcome Evaluation:        Outcome Summary: Pt has rested well throughout the night. UOP 500mL. CIWA 1. Pt has not reported any anxiety. Half of Valium dose given at midnight d/t pt being very drowsy. CPAP ordered d/t O2 sats dropping while sleeping, pt refused to wear. No complaints from pt this shift. VSS. Afebrile. TF restarted through keofeed, tolerating well. Will continue to monitor.

## 2021-03-04 NOTE — NURSING NOTE
Decreased scheduled med dose, no prns. UOP 675ml. Up to chair. Transferred to PCU, sitter continued. Mother updated and SW updated on pt status, to follow up.

## 2021-03-04 NOTE — PROGRESS NOTES
"   LOS: 11 days    Patient Care Team:  Provider, No Known as PCP - General  Provider, No Known as PCP - Family Medicine    Chief Complaint: Suicide attempt  Consulted for diabetes insipidus, hyper natremia, chronic lithium use  Subjective     Interval History:   No acute events overnight. No new complaints. Right hand swollen-improving.   Review of Systems:   Alert today. Denies any SOA , CP , fever, rash, n/v. Abdominal pain.     Objective     Vital Sign Min/Max for last 24 hours  Temp  Min: 97.6 °F (36.4 °C)  Max: 98.2 °F (36.8 °C)   BP  Min: 116/81  Max: 168/98   Pulse  Min: 77  Max: 100   Resp  Min: 16  Max: 22   SpO2  Min: 88 %  Max: 98 %   Flow (L/min)  Min: 1  Max: 5   Weight  Min: 94.8 kg (208 lb 15.9 oz)  Max: 94.8 kg (208 lb 15.9 oz)     Flowsheet Rows      First Filed Value   Admission Height  170.2 cm (67\") Documented at 02/21/2021 1759   Admission Weight  81.6 kg (180 lb) Documented at 02/21/2021 1759          No intake/output data recorded.  I/O last 3 completed shifts:  In: 3140 [Other:915; NG/GT:2225]  Out: 2000 [Urine:2000]    Physical Exam:  General Appearance: Alert, NAD  Eyes: Pupils are equal.  EOMI  Neck: Supple no JVD.  Lungs: Clear auscultation,  nonlabored.  Heart: No gallop, murmur, rub, RRR.  Abdomen: Soft, positive bowel sounds, no organomegaly.  Extremities: No edema, no cyanosis.  Neuro: Awake, alert, no focal deficit.  Skin: Warm and dry.        WBC WBC   Date Value Ref Range Status   03/04/2021 14.49 (H) 3.40 - 10.80 10*3/mm3 Final   03/03/2021 18.38 (H) 3.40 - 10.80 10*3/mm3 Final   03/02/2021 13.38 (H) 3.40 - 10.80 10*3/mm3 Final      HGB Hemoglobin   Date Value Ref Range Status   03/04/2021 9.6 (L) 12.0 - 15.9 g/dL Final   03/03/2021 10.2 (L) 12.0 - 15.9 g/dL Final   03/02/2021 11.0 (L) 12.0 - 15.9 g/dL Final      HCT Hematocrit   Date Value Ref Range Status   03/04/2021 34.5 34.0 - 46.6 % Final   03/03/2021 36.9 34.0 - 46.6 % Final   03/02/2021 39.8 34.0 - 46.6 % Final    "   Platlets No results found for: LABPLAT   MCV MCV   Date Value Ref Range Status   03/04/2021 88.2 79.0 - 97.0 fL Final   03/03/2021 88.3 79.0 - 97.0 fL Final   03/02/2021 87.9 79.0 - 97.0 fL Final          Sodium Sodium   Date Value Ref Range Status   03/04/2021 137 136 - 145 mmol/L Final   03/03/2021 137 136 - 145 mmol/L Final   03/03/2021 139 136 - 145 mmol/L Final   03/02/2021 140 136 - 145 mmol/L Final   03/02/2021 139 136 - 145 mmol/L Final   03/02/2021 142 136 - 145 mmol/L Final   03/01/2021 139 136 - 145 mmol/L Final   03/01/2021 139 136 - 145 mmol/L Final      Potassium Potassium   Date Value Ref Range Status   03/04/2021 4.1 3.5 - 5.2 mmol/L Final   03/03/2021 4.4 3.5 - 5.2 mmol/L Final   03/03/2021 3.2 (L) 3.5 - 5.2 mmol/L Final   03/02/2021 4.4 3.5 - 5.2 mmol/L Final     Comment:     Slight hemolysis detected by analyzer. Results may be affected.      Chloride Chloride   Date Value Ref Range Status   03/04/2021 99 98 - 107 mmol/L Final   03/03/2021 97 (L) 98 - 107 mmol/L Final   03/02/2021 101 98 - 107 mmol/L Final      CO2 CO2   Date Value Ref Range Status   03/04/2021 27.0 22.0 - 29.0 mmol/L Final   03/03/2021 29.0 22.0 - 29.0 mmol/L Final   03/02/2021 25.0 22.0 - 29.0 mmol/L Final      BUN BUN   Date Value Ref Range Status   03/04/2021 19 6 - 20 mg/dL Final   03/03/2021 18 6 - 20 mg/dL Final   03/02/2021 13 6 - 20 mg/dL Final      Creatinine Creatinine   Date Value Ref Range Status   03/04/2021 0.61 0.57 - 1.00 mg/dL Final   03/03/2021 0.71 0.57 - 1.00 mg/dL Final   03/02/2021 0.67 0.57 - 1.00 mg/dL Final      Calcium Calcium   Date Value Ref Range Status   03/04/2021 10.0 8.6 - 10.5 mg/dL Final   03/03/2021 9.8 8.6 - 10.5 mg/dL Final   03/02/2021 10.2 8.6 - 10.5 mg/dL Final      PO4 No results found for: CAPO4   Albumin Albumin   Date Value Ref Range Status   03/04/2021 3.90 3.50 - 5.20 g/dL Final   03/03/2021 4.10 3.50 - 5.20 g/dL Final   03/02/2021 4.40 3.50 - 5.20 g/dL Final      Magnesium  Magnesium   Date Value Ref Range Status   03/04/2021 2.2 1.6 - 2.6 mg/dL Final   03/03/2021 2.4 1.6 - 2.6 mg/dL Final   03/02/2021 2.3 1.6 - 2.6 mg/dL Final      Uric Acid No results found for: URICACID        Results Review:     I reviewed the patient's new clinical results.    carvedilol, 3.125 mg, Nasogastric, BID With Meals  diazePAM, 30 mg, Nasogastric, Q6H  docusate sodium, 100 mg, Nasogastric, BID  enoxaparin, 40 mg, Subcutaneous, Q24H  folic acid, 1 mg, Nasogastric, Daily  hydroCHLOROthiazide, 12.5 mg, Nasogastric, Daily  insulin regular, 0-7 Units, Subcutaneous, Q6H  lansoprazole, 30 mg, Nasogastric, BID AC  levothyroxine, 88 mcg, Nasogastric, Q AM  melatonin, 10 mg, Nasogastric, Nightly  multivitamin chewable, 1 tablet, Nasogastric, Daily  QUEtiapine, 200 mg, Nasogastric, Nightly  QUEtiapine, 200 mg, Nasogastric, Daily  sennosides, 10 mL, Nasogastric, BID  sodium chloride, 10 mL, Intravenous, Q12H  thiamine, 100 mg, Nasogastric, Daily           Medication Review: As noted above    Assessment/Plan       Alcohol withdrawal    Suicide attempt (CMS/[facility])    Refusal of blood transfusions as patient is Yars[place]    Toxic effect of caustic substance    Nephrogenic diabetes insipidus (CMS/HCC)    1.  Polyuria: Secondary to osmotic diuresis - Urine Osm 668 with normal serum sodium at presentation suggestive of osmotic diuresis plus urine osm X 24 hr urine volume is greater than 1000 mosmole - confirming osmotic diuresis rather than Water diuresis. Patient was on lithium now off. Resolved.   2.  Suicide attempt.  3.  HTN and tachycardia - much better    Plan:  - Continue with HCTZ   - Continue with low sodium diet.   - Monitor H/H. Check iron studies.     Case discussed with the medical staff .       Koby Mcmahon MD  03/04/21  09:06 EST

## 2021-03-04 NOTE — PROGRESS NOTES
Clinical Nutrition     Multidisciplinary Rounds      Patient Name: Elvia Montero  Date of Encounter: 03/04/21 11:36 EST  MRN: [id number]  Admission date: 2/21/2021      Reason for visit: EASTON. RD to continue to follow per protocol.     Pt resting in bed, reports she had some some indigestion earlier but this has resolved, mother at bedside    Per RN: keofeed placed yesterday, 1 does of steroids given to alleviate edema, pt tolerating TF, off precedex, mostly oriented    Current diet: NPO Diet  Orders Placed This Encounter      Diet, Tube Feeding Tube Feeding Formula: Peptamen AF (Peptide Based, Critical Care, ALI)  goal rate @70ml/hr, free water @40ml/hr    Intake: 996ml, 71% goal volume    Intervention:  Follow treatment plan  Care plan reviewed    Follow up:   Per protocol      Abbie Coburn RD  11:36 EST  Time: 20min

## 2021-03-05 LAB
ALBUMIN SERPL-MCNC: 3.8 G/DL (ref 3.5–5.2)
ANION GAP SERPL CALCULATED.3IONS-SCNC: 11 MMOL/L (ref 5–15)
BASOPHILS # BLD AUTO: 0.04 10*3/MM3 (ref 0–0.2)
BASOPHILS NFR BLD AUTO: 0.5 % (ref 0–1.5)
BH CV UPPER VENOUS RIGHT AXILLARY AUGMENT: NORMAL
BH CV UPPER VENOUS RIGHT AXILLARY COMPRESS: NORMAL
BH CV UPPER VENOUS RIGHT AXILLARY PHASIC: NORMAL
BH CV UPPER VENOUS RIGHT AXILLARY SPONT: NORMAL
BH CV UPPER VENOUS RIGHT BASILIC FOREARM COLOR: 1
BH CV UPPER VENOUS RIGHT BASILIC FOREARM COMPRESS: NORMAL
BH CV UPPER VENOUS RIGHT BASILIC UPPER COLOR: 1
BH CV UPPER VENOUS RIGHT BASILIC UPPER COMPRESS: NORMAL
BH CV UPPER VENOUS RIGHT BRACHIAL COMPRESS: NORMAL
BH CV UPPER VENOUS RIGHT INTERNAL JUGULAR AUGMENT: NORMAL
BH CV UPPER VENOUS RIGHT INTERNAL JUGULAR COMPRESS: NORMAL
BH CV UPPER VENOUS RIGHT INTERNAL JUGULAR PHASIC: NORMAL
BH CV UPPER VENOUS RIGHT INTERNAL JUGULAR SPONT: NORMAL
BH CV UPPER VENOUS RIGHT SUBCLAVIAN AUGMENT: NORMAL
BH CV UPPER VENOUS RIGHT SUBCLAVIAN COMPRESS: NORMAL
BH CV UPPER VENOUS RIGHT SUBCLAVIAN PHASIC: NORMAL
BH CV UPPER VENOUS RIGHT SUBCLAVIAN SPONT: NORMAL
BUN SERPL-MCNC: 22 MG/DL (ref 6–20)
BUN/CREAT SERPL: 34.4 (ref 7–25)
CALCIUM SPEC-SCNC: 10.2 MG/DL (ref 8.6–10.5)
CHLORIDE SERPL-SCNC: 99 MMOL/L (ref 98–107)
CO2 SERPL-SCNC: 29 MMOL/L (ref 22–29)
CREAT SERPL-MCNC: 0.64 MG/DL (ref 0.57–1)
DEPRECATED RDW RBC AUTO: 70.4 FL (ref 37–54)
EOSINOPHIL # BLD AUTO: 0.11 10*3/MM3 (ref 0–0.4)
EOSINOPHIL NFR BLD AUTO: 1.4 % (ref 0.3–6.2)
ERYTHROCYTE [DISTWIDTH] IN BLOOD BY AUTOMATED COUNT: 22.9 % (ref 12.3–15.4)
FERRITIN SERPL-MCNC: 94.5 NG/ML (ref 13–150)
GFR SERPL CREATININE-BSD FRML MDRD: 99 ML/MIN/1.73
GLUCOSE BLDC GLUCOMTR-MCNC: 168 MG/DL (ref 70–130)
GLUCOSE BLDC GLUCOMTR-MCNC: 177 MG/DL (ref 70–130)
GLUCOSE BLDC GLUCOMTR-MCNC: 190 MG/DL (ref 70–130)
GLUCOSE BLDC GLUCOMTR-MCNC: 191 MG/DL (ref 70–130)
GLUCOSE BLDC GLUCOMTR-MCNC: 213 MG/DL (ref 70–130)
GLUCOSE SERPL-MCNC: 170 MG/DL (ref 65–99)
HCT VFR BLD AUTO: 35.9 % (ref 34–46.6)
HGB BLD-MCNC: 10 G/DL (ref 12–15.9)
IMM GRANULOCYTES # BLD AUTO: 0.05 10*3/MM3 (ref 0–0.05)
IMM GRANULOCYTES NFR BLD AUTO: 0.6 % (ref 0–0.5)
IRON 24H UR-MRATE: 39 MCG/DL (ref 37–145)
IRON SATN MFR SERPL: 9 % (ref 20–50)
LYMPHOCYTES # BLD AUTO: 1.97 10*3/MM3 (ref 0.7–3.1)
LYMPHOCYTES NFR BLD AUTO: 24.5 % (ref 19.6–45.3)
MAGNESIUM SERPL-MCNC: 2 MG/DL (ref 1.6–2.6)
MCH RBC QN AUTO: 24.2 PG (ref 26.6–33)
MCHC RBC AUTO-ENTMCNC: 27.9 G/DL (ref 31.5–35.7)
MCV RBC AUTO: 86.9 FL (ref 79–97)
MONOCYTES # BLD AUTO: 0.5 10*3/MM3 (ref 0.1–0.9)
MONOCYTES NFR BLD AUTO: 6.2 % (ref 5–12)
NEUTROPHILS NFR BLD AUTO: 5.38 10*3/MM3 (ref 1.7–7)
NEUTROPHILS NFR BLD AUTO: 66.8 % (ref 42.7–76)
NRBC BLD AUTO-RTO: 0 /100 WBC (ref 0–0.2)
PHOSPHATE SERPL-MCNC: 2.9 MG/DL (ref 2.5–4.5)
PLATELET # BLD AUTO: 634 10*3/MM3 (ref 140–450)
PMV BLD AUTO: 10.3 FL (ref 6–12)
POTASSIUM SERPL-SCNC: 3.2 MMOL/L (ref 3.5–5.2)
POTASSIUM SERPL-SCNC: 4.3 MMOL/L (ref 3.5–5.2)
RBC # BLD AUTO: 4.13 10*6/MM3 (ref 3.77–5.28)
SODIUM SERPL-SCNC: 139 MMOL/L (ref 136–145)
TIBC SERPL-MCNC: 428 MCG/DL (ref 298–536)
TRANSFERRIN SERPL-MCNC: 287 MG/DL (ref 200–360)
WBC # BLD AUTO: 8.05 10*3/MM3 (ref 3.4–10.8)

## 2021-03-05 PROCEDURE — 80069 RENAL FUNCTION PANEL: CPT | Performed by: INTERNAL MEDICINE

## 2021-03-05 PROCEDURE — 82962 GLUCOSE BLOOD TEST: CPT

## 2021-03-05 PROCEDURE — 25010000002 ENOXAPARIN PER 10 MG: Performed by: INTERNAL MEDICINE

## 2021-03-05 PROCEDURE — 83735 ASSAY OF MAGNESIUM: CPT | Performed by: INTERNAL MEDICINE

## 2021-03-05 PROCEDURE — 85025 COMPLETE CBC W/AUTO DIFF WBC: CPT | Performed by: INTERNAL MEDICINE

## 2021-03-05 PROCEDURE — 25010000002 ONDANSETRON PER 1 MG: Performed by: INTERNAL MEDICINE

## 2021-03-05 PROCEDURE — 84466 ASSAY OF TRANSFERRIN: CPT | Performed by: INTERNAL MEDICINE

## 2021-03-05 PROCEDURE — 92526 ORAL FUNCTION THERAPY: CPT

## 2021-03-05 PROCEDURE — 84132 ASSAY OF SERUM POTASSIUM: CPT | Performed by: INTERNAL MEDICINE

## 2021-03-05 PROCEDURE — 83540 ASSAY OF IRON: CPT | Performed by: INTERNAL MEDICINE

## 2021-03-05 PROCEDURE — 63710000001 INSULIN REGULAR HUMAN PER 5 UNITS: Performed by: INTERNAL MEDICINE

## 2021-03-05 PROCEDURE — 82728 ASSAY OF FERRITIN: CPT | Performed by: INTERNAL MEDICINE

## 2021-03-05 PROCEDURE — 99232 SBSQ HOSP IP/OBS MODERATE 35: CPT | Performed by: INTERNAL MEDICINE

## 2021-03-05 RX ORDER — CARVEDILOL 3.12 MG/1
3.12 TABLET ORAL ONCE
Status: COMPLETED | OUTPATIENT
Start: 2021-03-05 | End: 2021-03-05

## 2021-03-05 RX ORDER — POTASSIUM CHLORIDE 1.5 G/1.77G
30 POWDER, FOR SOLUTION ORAL DAILY
Status: DISCONTINUED | OUTPATIENT
Start: 2021-03-06 | End: 2021-03-09

## 2021-03-05 RX ORDER — CARVEDILOL 6.25 MG/1
6.25 TABLET ORAL 2 TIMES DAILY WITH MEALS
Status: DISCONTINUED | OUTPATIENT
Start: 2021-03-05 | End: 2021-03-09

## 2021-03-05 RX ORDER — MAGNESIUM SULFATE HEPTAHYDRATE 40 MG/ML
4 INJECTION, SOLUTION INTRAVENOUS AS NEEDED
Status: DISCONTINUED | OUTPATIENT
Start: 2021-03-05 | End: 2021-03-11 | Stop reason: HOSPADM

## 2021-03-05 RX ORDER — DIAZEPAM 5 MG/1
15 TABLET ORAL EVERY 6 HOURS PRN
Status: DISCONTINUED | OUTPATIENT
Start: 2021-03-05 | End: 2021-03-07

## 2021-03-05 RX ORDER — POTASSIUM CHLORIDE 750 MG/1
30 CAPSULE, EXTENDED RELEASE ORAL DAILY
Status: DISCONTINUED | OUTPATIENT
Start: 2021-03-06 | End: 2021-03-05

## 2021-03-05 RX ADMIN — SODIUM CHLORIDE, PRESERVATIVE FREE 10 ML: 5 INJECTION INTRAVENOUS at 20:47

## 2021-03-05 RX ADMIN — DIAZEPAM 15 MG: 5 TABLET ORAL at 06:39

## 2021-03-05 RX ADMIN — DIAZEPAM 15 MG: 5 TABLET ORAL at 12:08

## 2021-03-05 RX ADMIN — ONDANSETRON 4 MG: 2 INJECTION INTRAMUSCULAR; INTRAVENOUS at 10:01

## 2021-03-05 RX ADMIN — Medication 1 TABLET: at 08:14

## 2021-03-05 RX ADMIN — CARVEDILOL 3.12 MG: 3.12 TABLET, FILM COATED ORAL at 12:08

## 2021-03-05 RX ADMIN — INSULIN HUMAN 3 UNITS: 100 INJECTION, SOLUTION PARENTERAL at 00:19

## 2021-03-05 RX ADMIN — LANSOPRAZOLE 30 MG: KIT at 17:42

## 2021-03-05 RX ADMIN — POTASSIUM CHLORIDE 40 MEQ: 1.5 POWDER, FOR SOLUTION ORAL at 09:19

## 2021-03-05 RX ADMIN — THIAMINE HCL TAB 100 MG 100 MG: 100 TAB at 08:15

## 2021-03-05 RX ADMIN — ACETAMINOPHEN 650 MG: 160 SOLUTION ORAL at 03:58

## 2021-03-05 RX ADMIN — HYDROCHLOROTHIAZIDE 12.5 MG: 12.5 CAPSULE ORAL at 08:15

## 2021-03-05 RX ADMIN — DIAZEPAM 15 MG: 5 TABLET ORAL at 00:19

## 2021-03-05 RX ADMIN — QUETIAPINE FUMARATE 100 MG: 100 TABLET ORAL at 08:15

## 2021-03-05 RX ADMIN — FOLIC ACID 1 MG: 1 TABLET ORAL at 08:15

## 2021-03-05 RX ADMIN — INSULIN HUMAN 2 UNITS: 100 INJECTION, SOLUTION PARENTERAL at 13:27

## 2021-03-05 RX ADMIN — LANSOPRAZOLE 30 MG: KIT at 08:23

## 2021-03-05 RX ADMIN — ENOXAPARIN SODIUM 40 MG: 40 INJECTION SUBCUTANEOUS at 08:14

## 2021-03-05 RX ADMIN — SENNOSIDES 10 ML: 8.8 LIQUID ORAL at 08:16

## 2021-03-05 RX ADMIN — INSULIN HUMAN 2 UNITS: 100 INJECTION, SOLUTION PARENTERAL at 19:37

## 2021-03-05 RX ADMIN — Medication 10 MG: at 20:47

## 2021-03-05 RX ADMIN — POTASSIUM CHLORIDE 40 MEQ: 1.5 POWDER, FOR SOLUTION ORAL at 15:33

## 2021-03-05 RX ADMIN — CARVEDILOL 6.25 MG: 6.25 TABLET, FILM COATED ORAL at 17:41

## 2021-03-05 RX ADMIN — DOCUSATE SODIUM 100 MG: 50 LIQUID ORAL at 08:16

## 2021-03-05 RX ADMIN — LEVOTHYROXINE SODIUM 88 MCG: 88 TABLET ORAL at 06:39

## 2021-03-05 RX ADMIN — SODIUM CHLORIDE, PRESERVATIVE FREE 10 ML: 5 INJECTION INTRAVENOUS at 08:17

## 2021-03-05 RX ADMIN — CARVEDILOL 3.12 MG: 3.12 TABLET, FILM COATED ORAL at 08:14

## 2021-03-05 RX ADMIN — INSULIN HUMAN 2 UNITS: 100 INJECTION, SOLUTION PARENTERAL at 06:39

## 2021-03-05 NOTE — PROGRESS NOTES
"   LOS: 12 days    Patient Care Team:  Provider, No Known as PCP - General  Provider, No Known as PCP - Family Medicine    Chief Complaint: Suicide attempt  Consulted for diabetes insipidus, hyper natremia, chronic lithium use  Subjective     Interval History:   No acute events overnight. No new complaints. Right hand swollen-improving.   Review of Systems:   Alert today. Denies any SOA , CP , fever, rash, n/v. Abdominal pain.     Objective     Vital Sign Min/Max for last 24 hours  Temp  Min: 97.4 °F (36.3 °C)  Max: 98.7 °F (37.1 °C)   BP  Min: 138/88  Max: 178/103   Pulse  Min: 77  Max: 93   Resp  Min: 16  Max: 16   SpO2  Min: 91 %  Max: 100 %   Flow (L/min)  Min: 2  Max: 2   Weight  Min: 88.4 kg (194 lb 14.4 oz)  Max: 94.3 kg (208 lb)     Flowsheet Rows      First Filed Value   Admission Height  170.2 cm (67\") Documented at 02/21/2021 1759   Admission Weight  81.6 kg (180 lb) Documented at 02/21/2021 1759          No intake/output data recorded.  I/O last 3 completed shifts:  In: 3622 [Other:1002; NG/GT:2620]  Out: 1425 [Urine:1425]    Physical Exam:  General Appearance: Alert, NAD  Eyes: Pupils are equal.  EOMI  Neck: Supple no JVD.  Lungs: Clear auscultation,  nonlabored.  Heart: No gallop, murmur, rub, RRR.  Abdomen: Soft, positive bowel sounds, no organomegaly.  Extremities: No edema, no cyanosis.  Neuro: Awake, alert, no focal deficit.  Skin: Warm and dry.        WBC WBC   Date Value Ref Range Status   03/05/2021 8.05 3.40 - 10.80 10*3/mm3 Final   03/04/2021 14.49 (H) 3.40 - 10.80 10*3/mm3 Final   03/03/2021 18.38 (H) 3.40 - 10.80 10*3/mm3 Final      HGB Hemoglobin   Date Value Ref Range Status   03/05/2021 10.0 (L) 12.0 - 15.9 g/dL Final   03/04/2021 9.6 (L) 12.0 - 15.9 g/dL Final   03/03/2021 10.2 (L) 12.0 - 15.9 g/dL Final      HCT Hematocrit   Date Value Ref Range Status   03/05/2021 35.9 34.0 - 46.6 % Final   03/04/2021 34.5 34.0 - 46.6 % Final   03/03/2021 36.9 34.0 - 46.6 % Final      Platlets No " results found for: LABPLAT   MCV MCV   Date Value Ref Range Status   03/05/2021 86.9 79.0 - 97.0 fL Final   03/04/2021 88.2 79.0 - 97.0 fL Final   03/03/2021 88.3 79.0 - 97.0 fL Final          Sodium Sodium   Date Value Ref Range Status   03/05/2021 139 136 - 145 mmol/L Final   03/04/2021 137 136 - 145 mmol/L Final   03/03/2021 137 136 - 145 mmol/L Final   03/03/2021 139 136 - 145 mmol/L Final   03/02/2021 140 136 - 145 mmol/L Final   03/02/2021 139 136 - 145 mmol/L Final      Potassium Potassium   Date Value Ref Range Status   03/05/2021 3.2 (L) 3.5 - 5.2 mmol/L Final   03/04/2021 4.1 3.5 - 5.2 mmol/L Final   03/03/2021 4.4 3.5 - 5.2 mmol/L Final   03/03/2021 3.2 (L) 3.5 - 5.2 mmol/L Final      Chloride Chloride   Date Value Ref Range Status   03/05/2021 99 98 - 107 mmol/L Final   03/04/2021 99 98 - 107 mmol/L Final   03/03/2021 97 (L) 98 - 107 mmol/L Final      CO2 CO2   Date Value Ref Range Status   03/05/2021 29.0 22.0 - 29.0 mmol/L Final   03/04/2021 27.0 22.0 - 29.0 mmol/L Final   03/03/2021 29.0 22.0 - 29.0 mmol/L Final      BUN BUN   Date Value Ref Range Status   03/05/2021 22 (H) 6 - 20 mg/dL Final   03/04/2021 19 6 - 20 mg/dL Final   03/03/2021 18 6 - 20 mg/dL Final      Creatinine Creatinine   Date Value Ref Range Status   03/05/2021 0.64 0.57 - 1.00 mg/dL Final   03/04/2021 0.61 0.57 - 1.00 mg/dL Final   03/03/2021 0.71 0.57 - 1.00 mg/dL Final      Calcium Calcium   Date Value Ref Range Status   03/05/2021 10.2 8.6 - 10.5 mg/dL Final   03/04/2021 10.0 8.6 - 10.5 mg/dL Final   03/03/2021 9.8 8.6 - 10.5 mg/dL Final      PO4 No results found for: CAPO4   Albumin Albumin   Date Value Ref Range Status   03/05/2021 3.80 3.50 - 5.20 g/dL Final   03/04/2021 3.90 3.50 - 5.20 g/dL Final   03/03/2021 4.10 3.50 - 5.20 g/dL Final      Magnesium Magnesium   Date Value Ref Range Status   03/05/2021 2.0 1.6 - 2.6 mg/dL Final   03/04/2021 2.2 1.6 - 2.6 mg/dL Final   03/03/2021 2.4 1.6 - 2.6 mg/dL Final      Uric Acid No  results found for: URICACID        Results Review:     I reviewed the patient's new clinical results.    carvedilol, 6.25 mg, Nasogastric, BID With Meals  diazePAM, 15 mg, Nasogastric, Q6H  docusate sodium, 100 mg, Nasogastric, BID  enoxaparin, 40 mg, Subcutaneous, Q24H  folic acid, 1 mg, Nasogastric, Daily  hydroCHLOROthiazide, 12.5 mg, Nasogastric, Daily  insulin regular, 0-7 Units, Subcutaneous, Q6H  lansoprazole, 30 mg, Nasogastric, BID AC  levothyroxine, 88 mcg, Nasogastric, Q AM  melatonin, 10 mg, Nasogastric, Nightly  multivitamin chewable, 1 tablet, Nasogastric, Daily  potassium chloride, 30 mEq, Oral, Daily  QUEtiapine, 100 mg, Nasogastric, Daily  QUEtiapine, 200 mg, Nasogastric, Nightly  sennosides, 10 mL, Nasogastric, BID  sodium chloride, 10 mL, Intravenous, Q12H  thiamine, 100 mg, Nasogastric, Daily           Medication Review: As noted above    Assessment/Plan       Alcohol withdrawal    Suicide attempt (CMS/[facility])    Refusal of blood transfusions as patient is Yazidism    Toxic effect of caustic substance    Nephrogenic diabetes insipidus (CMS/[facility])    1.  Polyuria: Secondary to osmotic diuresis - Urine Osm 668 with normal serum sodium at presentation suggestive of osmotic diuresis plus urine osm X 24 hr urine volume is greater than 1000 mosmole - confirming osmotic diuresis rather than Water diuresis. Patient was on lithium now off. Resolved.   2.  Suicide attempt.  3.  HTN and tachycardia - much better  4. Absolute iron def     Plan:  - Continue with current. Will add K supplement.   - Will increase dose of coreg   - Monitor H/H.   -IV iron.    Case discussed with the medical staff .       Koby Mcmahon MD  03/05/21  10:23 EST

## 2021-03-05 NOTE — PROGRESS NOTES
Continued Stay Note  [facility]     Patient Name: Elvia Montero  MRN: [id number]  Today's Date: 3/5/2021    Admit Date: 2/21/2021    Discharge Plan     Row Name 03/05/21 3657       Plan    Plan  update    Patient/Family in Agreement with Plan  yes    Plan Comments  Sw following for psych eval at discharge due to SI.  Opioid triage consult order placed.  Spoke to Sapna with Chemical Dependency Team to advise of order who will see.  CM following, discharge plan is ongoing at this time but will likely need psych placement.    Final Discharge Disposition Code  65 - psychiatric hospital or unit    Row Name 03/05/21 9878       Plan    Plan  Social work met the patient and discussed with her about her suicide attempt. She has been to the [facility] before and she was in a program through the [facility]. She said that she would be agreeable to going to a facility for inpatient treatment when she became medically clear and able to be discharged from the hospital.        Discharge Codes    No documentation.       Expected Discharge Date and Time     Expected Discharge Date Expected Discharge Time    Mar 6, 2021             ZARIA Morales

## 2021-03-05 NOTE — PLAN OF CARE
Goal Outcome Evaluation:     Progress: improving   VSS with pt on RA. BP has been elevated but systolic has remained < 180. Pt still on tube feed due to inability to pass dysphagia test. Pt has tolerated tube feed. Potassium replaced today. Will cont to monitor.

## 2021-03-05 NOTE — PROGRESS NOTES
Clinical Nutrition     Nutrition Support Assessment  Reason for Visit:    EN f/u      Patient Name: Elvia Montero  YOB: 1975  MRN: [id number]  Date of Encounter: 03/05/21 15:15 EST  Admission date: 2/21/2021       RD changed TF orders to Peptamen AF @ 65ml/h x 22h/d  with 25ml/h fw x22h/d; TF Rx meets 100% estimated needs.      Nutrition Assessment   Assessment   Suicide Attempt s/p ingestion 1/2 cup Mr, Clean, bottle of wine, klonopin, seroquel  Caustic Esophagitis  Etoh w/d  Hypernatremia- resolved,  suspected due to diabetes insipidus    s/p EGD 2-22-21: inflammation of the uvula, grade c esophagitis, minimal injury, normal stomach, normal duodenum    PMH: She  has a past medical history of Anxiety, Bipolar 1 disorder (CMS/HCC), Depression, Disease of thyroid gland, Esophagitis, Gastroparesis, GERD (gastroesophageal reflux disease), and Obsessive compulsive disorder (2020).   PSxH: She  has a past surgical history that includes Neck surgery and Esophagogastroduodenoscopy (N/A, 2/22/2021).     Substance abuse: Etoh    Reported/Observed/Food/Nutrition Related History:     Pt asleep at time of RD visit.     Per RN: pt tolerating TF  Noted SLP eval today with continued recs for NPO status and plans to repeat instrumental exam in future.     Anthropometrics     Height: 67in  Weight:194lb per bed wt on 3/5  BMI: 30.4  IBW:135lb      Labs reviewed     Results from last 7 days   Lab Units 03/05/21  0602 03/04/21  0423 03/03/21  1558 03/03/21  1211 03/03/21  0331  03/01/21  0421  02/28/21  0707  02/27/21  1151   GLUCOSE mg/dL 170* 266*  --   --  171*   < > 154*  --  173*  --  133*   BUN mg/dL 22* 19  --   --  18   < > 12  --  16  --  15   CREATININE mg/dL 0.64 0.61  --   --  0.71   < > 0.81  --  0.76  --  0.77   SODIUM mmol/L 139 137  --  137 139   < > 141   < > 137   < > 144   CHLORIDE mmol/L 99 99  --   --  97*   < > 101  --  103  --  111*   POTASSIUM mmol/L 3.2* 4.1 4.4  --  3.2*   <  > 3.7   < > 3.5  --  3.9   PHOSPHORUS mg/dL 2.9 2.7  --   --  3.0   < > 3.4  --  4.4  --  3.6   MAGNESIUM mg/dL 2.0 2.2  --   --  2.4   < > 2.4  --  2.0  --  2.0   ALT (SGPT) U/L  --   --   --   --   --   --  53*  --  44*  --  37*    < > = values in this interval not displayed.     Results from last 7 days   Lab Units 03/05/21  0602 03/04/21  0423 03/03/21  0331  03/01/21  0421   ALBUMIN g/dL 3.80 3.90 4.10   < > 4.20   PREALBUMIN mg/dL  --   --   --   --  30.0   CRP mg/dL  --   --   --   --  10.96*   TRIGLYCERIDES mg/dL  --   --   --   --  251*    < > = values in this interval not displayed.       Results from last 7 days   Lab Units 03/05/21  1219 03/05/21  0531 03/05/21  0007 03/04/21  1210 03/04/21  0607 03/03/21  2325   GLUCOSE mg/dL 168* 190* 213* 167* 247* 340*     Lab Results   Lab Value Date/Time    HGBA1C 6.10 (H) 02/22/2021 0714       Medications reviewed-yes                 Current Nutrition Prescription     PO: NPO Diet     EN: Peptamen AF @70ml/hr x20h/d while in ICU, free water @40ml/hr; RD verified at bedside that TF/fw running as Rx'd. At goal rate of delivery, TF provides 1400ml, 1680kcal, 106g protein, 2634ml free water. RD will adjust goal rate now that out of ICU, since pt target rate of delivery is 22h/d once out of ICU.    Pt received avg of 1596ml TF during the past 3d (114% of goal volume).         Nutrition Diagnosis     2-23-21, 3-3  Problem Inadequate energy intake   Etiology Per Clinical Status   Signs/Symptoms 81% goal volume EN   Status:resolved 3/5.     3/5:  Problem--difficulty swallowing/needs alternate route  Etiology-dysphagia per SLP eval  S/S: NPO    Nutrition Intervention/ Rx:     Follow treatment progress, Nutrition support,  Adjust TF  Will change TF orders to Peptamen AF @ 65ml/h x 22h/d (1430ml/d) with 25ml/h fw x22hd. Rx will provide 1716cal (provides 28 huy/kg IBW and 19 huy/kg actual body wt)/109g pro (provides 1.8g/kg IBW and 1.2g/kg actual body wt)/1158ml TF fw and  1716ml total fw/d. TF Rx meets 100% estimated needs.   RD advised nsg staff of TF order changes.        Goal:   General: Nutrition support treatment    Adjust EN    Monitoring/Evaluation:   Per protocol    Kianna Chun MS,RD,LD, CNSC  Time Spent: 45min

## 2021-03-05 NOTE — PROGRESS NOTES
Continued Stay Note  [facility]     Patient Name: Elvia Montero  MRN: [id number]  Today's Date: 3/5/2021    Admit Date: 2/21/2021    Discharge Plan     Row Name 03/05/21 0641       Plan    Plan  update    Patient/Family in Agreement with Plan  yes    Plan Comments  Sw following for psych eval at discharge due to SI.  Opioid triage consult order placed.  Spoke to Sapna with Chemical Dependency Team to advise of order who will see.  CM following, discharge plan is ongoing at this time but will likely need psych placement.    Final Discharge Disposition Code  65 - psychiatric hospital or unit    Row Name 03/05/21 8216       Plan    Plan  Social work met the patient and discussed with her about her suicide attempt.        Discharge Codes    No documentation.       Expected Discharge Date and Time     Expected Discharge Date Expected Discharge Time    Mar 6, 2021             Ysabel Fernandez RN

## 2021-03-05 NOTE — PLAN OF CARE
Goal Outcome Evaluation:  Plan of Care Reviewed With: (P) patient  Progress: (P) improving      SLP treatment completed. Will continue to address dysphagia in tx. Please see note for further details and recommendations.

## 2021-03-05 NOTE — PROGRESS NOTES
INTENSIVIST   PROGRESS NOTE     Hospital:  LOS: 12 days      S     Ms. Elvia Montero, 45 y.o. female is followed for:      Alcohol withdrawal    Suicide attempt (CMS/HCC)    Refusal of blood transfusions as patient is Episcopal    Toxic effect of caustic substance    As an Intensivist, we provide an integrated approach to the ICU patient and family, medical management of comorbid conditions, including but not limited to electrolytes, glycemic control, organ dysfunction, lead interdisciplinary rounds and coordinate the care with all other services, including those from other specialists.     Interval History:  Doing much better.  Alert. Carrying a normal conversation.  Still with a EAD and Enteral Nutrition.  ST saw her this morning and not ready for PO trials.    SpO2 93% on ambient air.    Temp  Min: 97.2 °F (36.2 °C)  Max: 98.7 °F (37.1 °C)     The patient's relevant past medical, surgical and social history were reviewed and updated in Epic as appropriate.          O     Vitals:  Temp: 97.2 °F (36.2 °C) (03/05/21 1100) Temp  Min: 97.2 °F (36.2 °C)  Max: 98.7 °F (37.1 °C)   Temp core:      BP: 155/90 (03/05/21 1208) BP  Min: 138/88  Max: 178/103   Pulse: 91 (03/05/21 1208) Pulse  Min: 82  Max: 93   Resp: 16 (03/05/21 1100) Resp  Min: 16  Max: 16   SpO2: 94 % (03/05/21 0252) SpO2  Min: 91 %  Max: 100 %   Device: room air (03/05/21 1027)    Flow Rate: 2 (03/05/21 0600) Flow (L/min)  Min: 2  Max: 2     Intake/Ouptut 24 hrs (7:00AM - 6:59 AM)  Intake & Output (last 3 days)       03/02 0701 - 03/03 0700 03/03 0701 - 03/04 0700 03/04 0701 - 03/05 0700 03/05 0701 - 03/06 0700    I.V. (mL/kg) 139.6 (1.4)       Other 655 675 721     NG/GT 1598 1351 1840     Total Intake(mL/kg) 2392.6 (24.1) 2026 (21.4) 2561 (29)     Urine (mL/kg/hr) 1150 (0.5) 1650 (0.7) 825 (0.4)     Stool        Total Output 1150 1650 825     Net +1242.6 +376 +1736             Urine Unmeasured Occurrence 1 x 1 x 4 x 1 x    Stool Unmeasured  Occurrence   1 x           Wt Readings from Last 6 Encounters:   03/05/21 88.4 kg (194 lb 14.4 oz)     Physical Examination  Telemetry:  Rhythm: normal sinus rhythm (03/05/21 1027)     QTc Interval (Sec): 0.45 (03/03/21 2000)   Constitutional:  No acute distress.   Cardiovascular: RRR.   Normal heart sounds.  No murmurs, gallop or rub.   Respiratory: Normal breath sounds  No adventitious sounds.   Abdominal:  Soft with no tenderness.  No distension.   No HSM.   Extremities: Warm.  Dry.  No cyanosis.  No Edema   Neurological:   Awake. Confused.  Best Eye Response: 4-->(E4) spontaneous (03/05/21 0806)  Best Motor Response: 6-->(M6) obeys commands (03/05/21 0806)  Best Verbal Response: 4-->(V4) confused (03/05/21 0806)  [place] Coma Scale Score: 14 (03/05/21 0806)     Hematology:  Results from last 7 days   Lab Units 03/05/21  0602 03/04/21  0423   WBC 10*3/mm3 8.05 14.49*   HEMOGLOBIN g/dL 10.0* 9.6*   MCV fL 86.9 88.2   PLATELETS 10*3/mm3 634* 521*   NEUTROS ABS 10*3/mm3 5.38 12.64*   LYMPHS ABS 10*3/mm3 1.97 1.19   EOS ABS 10*3/mm3 0.11 0.01     Chemistry:  Estimated Creatinine Clearance: 124 mL/min (by C-G formula based on SCr of 0.64 mg/dL).  Results from last 7 days   Lab Units 03/05/21  0602 03/04/21  0423   SODIUM mmol/L 139 137   POTASSIUM mmol/L 3.2* 4.1   CHLORIDE mmol/L 99 99   CO2 mmol/L 29.0 27.0   BUN mg/dL 22* 19   CREATININE mg/dL 0.64 0.61   GLUCOSE mg/dL 170* 266*     Results from last 7 days   Lab Units 03/05/21  0602 03/04/21  0423 03/03/21  0331   CALCIUM mg/dL 10.2 10.0 9.8   MAGNESIUM mg/dL 2.0 2.2 2.4   PHOSPHORUS mg/dL 2.9 2.7 3.0     COVID-19  Lab Results   Lab Value Date/Time    COVID19 Not Detected 02/21/2021 2252     Lab Results   Lab Value Date/Time    CRP 10.96 (H) 03/01/2021 0421    CRP 4.51 (H) 02/23/2021 0813    PREALBUMIN 30.0 03/01/2021 0421    PREALBUMIN 30.3 02/23/2021 0813    TRIG 251 (H) 03/01/2021 0421    TRIG 347 (H) 02/23/2021 0813     Lab Results   Lab Value Date/Time     "IRON 39 2021 0602    LABIRON 9 (L) 2021 0602    TRANSFERRIN 287 2021 0602    TIBC 428 2021 0602    FERRITIN 94.50 2021 0602     Images:  Xr Abdomen Kub    Result Date: 3/3/2021  Feeding tube tip in the duodenal bulb.  DICTATED:   2021 EDITED/ls :   2021    This report was finalized on 3/3/2021 10:37 PM by Dr. Marky Guadarrama MD.      Xr Abdomen Kub    Result Date: 3/3/2021  Feeding tube tip now in the distal duodenum.  DICTATED:   2021 EDITED/ls :   2021    This report was finalized on 3/3/2021 10:37 PM by Dr. Marky Guadarrama MD.        Results: Reviewed.  I reviewed the patient's new laboratory and imaging results.  I independently reviewed the patient's new images.    Medications: Reviewed.    Assessment/Plan   A / P     Assessment:    Elvia Montero is a 45 y.o. female admitted 2021, after attempted suicide where she ingested a partial bottle of \"Mr. Clean\" and a bottle of wine.     1. Et [place] withdrawal  1. Seems resolved..  2. Suicide attempt (Mr Clean and prescription medications)    1. Esophageal injury  2. EGD 21: Grade C esophagitis, minimal injury.  3. Hypernatremia R/O Nephrogenic DI (Lithium therapy prior to admission) } Improved  4. Enteral Nutrition  1. Continue with Peptamen AF  2. Hopefully able to start PO soon  5. Orthodoxy  6. Anxiety/Depression  7. NC anemia, and Iron Deficiency anemia. Stable  1. Iron IV } Renal  8. Hypothyroidism on Rx.    Lab Results   Component Value Date    TSH 5.580 (H) 2021      9. Glucose: At risk for DM (pre-diabetes) based on her HbA1c. [HbA1C 5.7%-6.4%, eA-139 mg/dL].     Results from last 7 days   Lab Units 21  1219 21  0531 21  0007 21  1210 21  0607 21  2325   GLUCOSE mg/dL 168* 190* 213* 167* 247* 340*     Lab Results   Lab Value Date/Time    HGBA1C 6.10 (H) 2021 0714       Nutrition Support: Diet, Tube Feeding Tube Feeding Formula: Peptamen AF " (Peptide Based, Critical Care, ALI)   Modulars: Patient doesn't have any tube feeding modular orders   Diet: NPO Diet   Advance Directives: Code Status and Medical Interventions:   Ordered at: 02/21/21 2046     Code Status:    CPR     Medical Interventions (Level of Support Prior to Arrest):    Full        Plan:    1. ST recommends to continue with Enteral Nutrition Replacing K.  2. Start IV Iron } Nephrology  3. Discontinue Seroquel  4. Change Diazepam to prn  5. Goal: Glucose < 180 mg/dL.   6. CM/SW following for Psych evaluation  7. Nutrition biomarkers on Mon 3/8/21  1. Psych evaluation once medically cleared  8. Keep in PCU    Plan of care and goals reviewed during interdisciplinary rounds.  I discussed the patient's findings and my recommendations with patient    Level of Risk is High due to:  illness with threat to life or bodily function.     Time: 25 minutes, in direct patient care, with the patient and/or on the gonzales coordinating care with other health care providers.     I have spent > 50% percent of this time, counseling and discussing management.     Davion Barnett MD, FACP, FCCP, CNSC  Intensive Care Medicine, Nutrition Support and Pulmonary Medicine     [x]  Primary Attending  []  Consultant

## 2021-03-05 NOTE — PLAN OF CARE
Goal Outcome Evaluation:  Plan of Care Reviewed With: patient  Progress: no change  Outcome Summary: VSS.  2L NC.  NSR.  Pt c/o headache overnight.  Reported seeing people in the room that were not there.  Also reported hearing the sound of a treadmill in a gym.  CIWA score of 3.  Ambulating in room with standby assist.  Tolerating tube feed.  Will continue to monitor.

## 2021-03-05 NOTE — THERAPY TREATMENT NOTE
Acute Care - Speech Language Pathology   Swallow Treatment Note [facility]     Patient Name: Elvia Montero  : 1975  MRN: [id number]  Today's Date: 3/5/2021               Admit Date: 2021    Visit Dx:     ICD-10-CM ICD-9-CM   1. Suicide attempt (CMS/[facility])  T14.91XA E958.9   2. Toxic effect of caustic substance, intentional self-harm, initial encounter (CMS/[facility])  T54.92XA 983.9     E950.7   3. Esophagitis  K20.90 530.10   4. Alcoholic intoxication without complication (CMS/[facility])  F10.920 305.00   5. Dysphagia, unspecified type  R13.10 787.20     Patient Active Problem List   Diagnosis   • Suicide attempt (CMS/[facility])   • Lactic acidosis   • Refusal of blood transfusions as patient is Buddhist   • Elevated ETOH level   • Toxic effect of caustic substance   • Esophagitis   • Alcohol withdrawal   • Nephrogenic diabetes insipidus (CMS/[facility])     Past Medical History:   Diagnosis Date   • Anxiety    • Bipolar 1 disorder (CMS/[facility])    • Depression    • Disease of thyroid gland    • Esophagitis    • Gastroparesis    • GERD (gastroesophageal reflux disease)    • Obsessive compulsive disorder      Past Surgical History:   Procedure Laterality Date   • ENDOSCOPY N/A 2021    Procedure: ESOPHAGOGASTRODUODENOSCOPY;  Surgeon: Chaparro Chester MD;  Location: [facility];  Service: Gastroenterology;  Laterality: N/A;   • NECK SURGERY          SWALLOW EVALUATION (last 72 hours)      [facility] Adult Swallow Evaluation     Row Name 21 1015 21 1100       Rehab Evaluation    Document Type  therapy note (daily note)  (Pended)   -EN  evaluation  -    Subjective Information  no complaints  (Pended)   -EN  no complaints  -    Patient Observations  cooperative;agree to therapy  (Pended)   -EN  alert;cooperative  -    Patient/Family/Caregiver Comments/Observations  no family present   (Pended)   -EN  no family present  -    Care Plan Review  evaluation/treatment results reviewed;care plan/treatment  goals reviewed;risks/benefits reviewed;current/potential barriers reviewed;patient/other agree to care plan  (Pended)   -EN  evaluation/treatment results reviewed;patient/other agree to care plan  -    Patient Effort  good  (Pended)   -EN  good  -    Symptoms Noted During/After Treatment  none  (Pended)   -EN  none  -       General Information    Patient Profile Reviewed  yes  (Pended)   -EN  yes  -CJ    Pertinent History Of Current Problem  --  see initial eval; pt referred for FEES; pt agreeable to participate today  -    Current Method of Nutrition  --  NPO;nasogastric feedings NG tube  -    Precautions/Limitations, Vision  --  WFL;for purposes of eval  -CJ    Precautions/Limitations, Hearing  --  WFL;for purposes of eval  -    Prior Level of Function-Communication  --  unknown  -    Prior Level of Function-Swallowing  --  safe, efficient swallowing in all situations  -    Plans/Goals Discussed with  --  patient;agreed upon  -    Barriers to Rehab  --  medically complex  -    Patient's Goals for Discharge  --  return to PO diet  -       Pain    Additional Documentation  Pain Scale: FACES Pre/Post-Treatment (Group)  (Pended)   -EN  Pain Scale: FACES Pre/Post-Treatment (Group)  -CJ       Pain Scale: FACES Pre/Post-Treatment    Pain: FACES Scale, Pretreatment  0-->no hurt  (Pended)   -EN  0-->no hurt  -    Posttreatment Pain Rating  0-->no hurt  (Pended)   -EN  0-->no hurt  -       Fiberoptic Endoscopic Evaluation of Swallowing (FEES)    Risks/Benefits Reviewed  --  risks/benefits explained;patient;agreed to eval  -    Nasal Entry  --  left:  -    Scope serial number/identification  --  [id number]  -       Anatomy and Physiology    Anatomic Considerations  --  edema;erythema;arytenoids;other (see comments) unable to visualize vocal cords  -    Comment  --  Severe edema of arytenoids that SLP unable to visualize true vocal cords. Mild-moderate edema of epiglottis. Moderate amount of clear  continous secretions  -CJ    Base of Tongue  --  symmetrical  -CJ    Epiglottis  --  edematous  -CJ    Laryngeal Function Breathing  --  CNA;other (see comments) unable to visualize  -CJ    Laryngeal Function Phonation  --  CNA;other (see comments) unable to visualize 2/2 edema  -CJ    Laryngeal Function to Breath Hold  --  CNA;other (see comments) unable to visualize 2/2 edema  -CJ    Secretion Rating Scale (Shailesh et al. 1996)  --  3- secretions inside the laryngeal vestibule/aspiration, not cleared  -CJ    Secretion Description  --  thin;clear;continuous  -CJ    Ice Chips  --  elicited swallow;partially cleared secretions;aspirated;no response to aspiration  -CJ    Spontaneous Swallow  --  frequency reduced;did not clear secretions  -CJ    Sensory  --  sensed scope  -CJ    Utensils Used  --  Spoon  -CJ    Consistencies Trialed  --  ice chips;thin liquids;pudding/puree  -CJ       FEES Interpretation    Oral Phase  --  prolonged manipulation;prespill of liquids into pharynx  -CJ       Initiation of Pharyngeal Swallow    Initiation of Pharyngeal Swallow  --  bolus in pyriform sinuses  -CJ    Pharyngeal Phase  --  impaired pharyngeal phase of swallowing  -CJ    Penetration During the Swallow  --  thin liquids;pudding/puree;secondary to delayed swallow initiation or mistiming;secondary to reduced laryngeal elevation;secondary to reduced vestibular closure  -CJ    Aspiration During the Swallow  --  thin liquids;pudding/puree;secondary to delayed swallow initiation or mistiming;secondary to reduced laryngeal elevation;secondary to reduced vestibular closure  -CJ    Penetration After the Swallow  --  thin liquids;pudding/puree;secondary to residue;in valleculae;in pyriform sinuses  -CJ    Aspiration After the Swallow  --  thin liquids;pudding/puree;secondary to residue;in valleculae;in pyriform sinuses;in laryngeal vestibule;other (see comments) mixed w/ pooled secretions  -CJ    Response to Penetration  --  deep;no  response;response with cue only;weak cough/throat clear  -CJ    Response to Aspiration  --  no response, silent aspiration;response with cue only;could not clear subglottic material;weak cough/throat clear  -CJ    Rosenbek's Scale  --  thin:;pudding/puree:;8-->Level 8  -CJ    Residue  --  thin liquids;pudding/puree;valleculae;pyriform sinuses;laryngeal vestibule;posterior pharyngeal wall;secondary to reduced posterior pharyngeal wall stripping;secondary to reduced laryngeal elevation;secondary to reduced hyolaryngeal excursion;other (see comments) severe amount  -CJ    Response to Residue  --  unable to clear residue;with spontaneous subsequent swallow;with liquid wash;with cued swallow  -CJ    FEES Summary  --  FEES completed this am. Ms. Montero is positioned upright and centered in bed to accept po presentations of ice chips x2, puree x1 and thin liquids x1. Further po presentations deferred 2/2 severity of dysphagia. Noted upon entry severe diffuse edema of arytenoids and laryngeal structures that SLP unable to visualize True vocal cords or airway. Noted large bore NG tube in place w/ increased edema on L side in comparison to R side. Noted significant amount of secretions in pyriforms and vestibule. Secretions are clear and thin, continuous. Ice chips elicited swallow w/ aspiration occuring, able to clear some secretions. Laryngeal penetration occuring during the swallow w/ thin and pudding when mixed w/ pooled secretions w/ subsequent silent aspiration during the swallow. Cued cough ineffective to clear aspirated material. Moderate-severe amount of diffuse pharyngeal residue w/ resulting silent aspiration occuring after the swallow. Cued cough able to clear some secretions to oral cavity. Per observed aspiration, severity of residue as well as significant edema further po presentations are deferred and endoscope is removed. Per this evaluation Ms. Montero presents w/ a severe pharyngeal dysphagia w/ edema as  significant contributing factor. She is felt to be at high risk for aspiration w/ all po intake. She is felt to most benefit from continued NPO w/ transition to keofeed for alternative method of nutrition/hydration.Suspect large bore is somewhat contributing to edema. Will benefit from dysphagia tx.   -       Clinical Impression    Daily Summary of Progress (SLP)  progress toward functional goals is good  (Pended)   -EN  --    Barriers to Overall Progress (SLP)  ? Cognitive status   (Pended)   -EN  --    SLP Swallowing Diagnosis  --  severe;pharyngeal dysphagia  -    Functional Impact  risk of aspiration/pneumonia  (Pended)   -EN  risk of aspiration/pneumonia  -    Rehab Potential/Prognosis, Swallowing  good, to achieve stated therapy goals  (Pended)   -EN  good, to achieve stated therapy goals  -    Swallow Criteria for Skilled Therapeutic Interventions Met  demonstrates skilled criteria  (Pended)   -EN  demonstrates skilled criteria  -    Plan for Continued Treatment (SLP)  Pt continues to display overt s/s of aspiration. Recommend continued NPO and f/u for repeat instrumental assessment of swallowing. Practiced exercises with pt this am. Lengthy explanation needed for confirmation of understanding. Unclear on cognitive status -- may benefit from SLC evaluation. Continue to follow to address dysphagia.   (Pended)   -EN  --       Recommendations    Therapy Frequency (Swallow)  5 days per week  (Pended)   -EN  5 days per week  -    Predicted Duration Therapy Intervention (Days)  until discharge  (Pended)   -EN  until discharge  -    SLP Diet Recommendation  NPO;temporary alternate methods of nutrition/hydration;other (see comments)  (Pended)   -EN  NPO;temporary alternate methods of nutrition/hydration;other (see comments) may benefit from change to keofeed  -    Recommended Precautions and Strategies  general aspiration precautions  (Pended)   -EN  general aspiration precautions  -    Oral Care  Recommendations  Oral Care BID/PRN;Suction toothbrush  (Pended)   -EN  Oral Care BID/PRN;Suction toothbrush  -    SLP Rec. for Method of Medication Administration  meds via alternate route  (Pended)   -EN  meds via alternate route  -    Monitor for Signs of Aspiration  yes;notify SLP if any concerns  (Pended)   -EN  yes;notify SLP if any concerns  -    Anticipated Discharge Disposition (SLP)  unknown;anticipate therapy at next level of care  (Pended)   -EN  unknown;anticipate therapy at next level of care  -      User Key  (r) = Recorded By, (t) = Taken By, (c) = Cosigned By    Initials Name Effective Dates     Eloise Kidd, MS CCC-SLP 02/11/20 -     EN Birgit Benítez, Speech Therapy Student 01/08/21 -           EDUCATION  The patient has been educated in the following areas:   Home Exercise Program (HEP) Dysphagia (Swallowing Impairment) Oral Care/Hydration Modified Diet Instruction.    SLP Recommendation and Plan     SLP Diet Recommendation: (P) NPO, temporary alternate methods of nutrition/hydration, other (see comments)  Recommended Precautions and Strategies: (P) general aspiration precautions  SLP Rec. for Method of Medication Administration: (P) meds via alternate route     Monitor for Signs of Aspiration: (P) yes, notify SLP if any concerns     Swallow Criteria for Skilled Therapeutic Interventions Met: (P) demonstrates skilled criteria  Anticipated Discharge Disposition (SLP): (P) unknown, anticipate therapy at next level of care  Rehab Potential/Prognosis, Swallowing: (P) good, to achieve stated therapy goals  Therapy Frequency (Swallow): (P) 5 days per week  Predicted Duration Therapy Intervention (Days): (P) until discharge     Daily Summary of Progress (SLP): (P) progress toward functional goals is good    Plan for Continued Treatment (SLP): (P) Pt continues to display overt s/s of aspiration. Recommend continued NPO and f/u to determine readiness for a repeat instrumental assessment of  swallowing. Practiced exercises with pt this am. Lengthy explanation needed for confirmation of understanding. Unclear on cognitive status -- may benefit from SLC evaluation. Continue to follow to address dysphagia.               Plan of Care Reviewed With: (P) patient  Progress: (P) improving    SLP GOALS     Row Name 03/05/21 1015 03/03/21 1100          Oral Nutrition/Hydration Goal 1 (SLP)    Oral Nutrition/Hydration Goal 1, SLP  LTG: Pt will return to some form of po diet w/o overt s/s of aspiration given no cues w/ 100% acc  (Pended)   -EN  LTG: Pt will return to some form of po diet w/o overt s/s of aspiration given no cues w/ 100% acc  -CJ     Time Frame (Oral Nutrition/Hydration Goal 1, SLP)  by discharge  (Pended)   -EN  by discharge  -CJ     Progress/Outcomes (Oral Nutrition/Hydration Goal 1, SLP)  goal ongoing  (Pended)   -EN  --        Oral Nutrition/Hydration Goal 2 (SLP)    Oral Nutrition/Hydration Goal 2, SLP  STG: Pt will accept therapeutic trials of ice chips, thin water w/o overt s/s of aspiration given no cues w/ 80% acc to determine pt readiness for repeat instrumental evaluatoin  (Pended)   -EN  STG: Pt will accept therapeutic trials of ice chips, thin water w/o overt s/s of aspiration given no cues w/ 80% acc to determine pt readiness for repeat instrumental evaluatoin  -CJ     Time Frame (Oral Nutrition/Hydration Goal 2, SLP)  short term goal (STG)  (Pended)   -EN  short term goal (STG)  -CJ     Barriers (Oral Nutrition/Hydration Goal 2, SLP)  Pt showing inconsistent TC and/or hard immediate cough with ice trials. May be indicating improved pharyngeal sensation.  (Pended)   -EN  --     Progress/Outcomes (Oral Nutrition/Hydration Goal 2, SLP)  continuing progress toward goal  (Pended)   -EN  --        Lingual Strengthening Goal 1 (SLP)    Activity (Lingual Strengthening Goal 1, SLP)  increase lingual tone/sensation/control/coordination/movement;increase tongue back strength  (Pended)   -EN   increase lingual tone/sensation/control/coordination/movement;increase tongue back strength  -CJ     Increase Lingual Tone/Sensation/Control/Coordination/Movement  swallow trials;lingual resistance exercises  (Pended)   -EN  swallow trials;lingual resistance exercises  -CJ     Increase Tongue Back Strength  swallow trials;lingual resistance exercises  (Pended)   -EN  swallow trials;lingual resistance exercises  -CJ     [place]/Accuracy (Lingual Strengthening Goal 1, SLP)  with minimal cues (75-90% accuracy)  (Pended)   -EN  with minimal cues (75-90% accuracy)  -CJ     Time Frame (Lingual Strengthening Goal 1, SLP)  short term goal (STG)  (Pended)   -EN  short term goal (STG)  -CJ     Progress/Outcomes (Lingual Strengthening Goal 1, SLP)  continuing progress toward goal  (Pended)   -EN  --        Pharyngeal Strengthening Exercise Goal 1 (SLP)    Activity (Pharyngeal Strengthening Goal 1, SLP)  increase timing;increase superior movement of the hyolaryngeal complex;increase anterior movement of the hyolaryngeal complex;increase closure at entrance to airway/closure of airway at glottis;increase squeeze/positive pressure generation;increase tongue base retraction  (Pended)   -EN  increase timing;increase superior movement of the hyolaryngeal complex;increase anterior movement of the hyolaryngeal complex;increase closure at entrance to airway/closure of airway at glottis;increase squeeze/positive pressure generation;increase tongue base retraction  -CJ     Increase Timing  prepping - 3 second prep or suck swallow or 3-step swallow  (Pended)   -EN  prepping - 3 second prep or suck swallow or 3-step swallow  -CJ     Increase Anterior Movement of the Hyolaryngeal Complex  chin tuck against resistance (CTAR)  (Pended)   -EN  chin tuck against resistance (CTAR)  -CJ     Increase Closure at Entrance to Airway/Closure of Airway at Glottis  supraglottic swallow  (Pended)   -EN  supraglottic swallow  -CJ     Increase  Squeeze/Positive Pressure Generation  hard effortful swallow  (Pended)   -EN  hard effortful swallow  -CJ     Increase Tongue Base Retraction  eron  (Pended)   -EN  eron  -CJ     [place]/Accuracy (Pharyngeal Strengthening Goal 1, SLP)  with minimal cues (75-90% accuracy)  (Pended)   -EN  with minimal cues (75-90% accuracy)  -CJ     Time Frame (Pharyngeal Strengthening Goal 1, SLP)  short term goal (STG)  (Pended)   -EN  short term goal (STG)  -CJ     Barriers (Pharyngeal Strengthening Goal 1, SLP)  Pt required moderate cues for completion of exercises however pt effort was excellent. Cog impacting??   (Pended)   -EN  --     Progress/Outcomes (Pharyngeal Strengthening Goal 1, SLP)  continuing progress toward goal  (Pended)   -EN  --       User Key  (r) = Recorded By, (t) = Taken By, (c) = Cosigned By    Initials Name Provider Type    Eloise Pollock, MS CCC-SLP Speech and Language Pathologist    Birgit Delgado, Speech Therapy Student Speech Therapy Student             Time Calculation:   Time Calculation- SLP     Row Name 03/05/21 1143             Time Calculation- SLP    SLP Start Time  1015  (Pended)   -EN      SLP Received On  03/05/21  (Pended)   -EN        User Key  (r) = Recorded By, (t) = Taken By, (c) = Cosigned By    Initials Name Provider Type    iBrgit Delgado, Speech Therapy Student Speech Therapy Student          Therapy Charges for Today     Code Description Service Date Service Provider Modifiers Qty    [id number]  ST TREATMENT SWALLOW 3 3/5/2021 Birgit Benítez, Speech Therapy Student GN 1        Patient was not wearing a face mask and did exhibit coughing during this therapy encounter.  Procedure performed was aerosolizing, involved close contact (within 6 feet for at least 15 minutes or longer), and did not involve contact with infectious secretions or specimens.  Therapist used appropriate personal protective equipment including gloves, standard procedure mask and eye  protection.  Appropriate PPE was worn during the entire therapy session.  Hand hygiene was completed before and after therapy session.  Krys Orlando was also present during this encounter and the aforementioned applies to them, as well.          Birgit Benítez, Speech Therapy Student  3/5/2021

## 2021-03-06 PROBLEM — F42.9 OBSESSIVE COMPULSIVE DISORDER: Status: ACTIVE | Noted: 2020-01-01

## 2021-03-06 LAB
ALBUMIN SERPL-MCNC: 3.9 G/DL (ref 3.5–5.2)
ANION GAP SERPL CALCULATED.3IONS-SCNC: 12 MMOL/L (ref 5–15)
BUN SERPL-MCNC: 19 MG/DL (ref 6–20)
BUN/CREAT SERPL: 33.9 (ref 7–25)
CALCIUM SPEC-SCNC: 9.9 MG/DL (ref 8.6–10.5)
CHLORIDE SERPL-SCNC: 101 MMOL/L (ref 98–107)
CO2 SERPL-SCNC: 24 MMOL/L (ref 22–29)
CREAT SERPL-MCNC: 0.56 MG/DL (ref 0.57–1)
GFR SERPL CREATININE-BSD FRML MDRD: 116 ML/MIN/1.73
GLUCOSE BLDC GLUCOMTR-MCNC: 160 MG/DL (ref 70–130)
GLUCOSE BLDC GLUCOMTR-MCNC: 174 MG/DL (ref 70–130)
GLUCOSE BLDC GLUCOMTR-MCNC: 178 MG/DL (ref 70–130)
GLUCOSE BLDC GLUCOMTR-MCNC: 195 MG/DL (ref 70–130)
GLUCOSE BLDC GLUCOMTR-MCNC: 204 MG/DL (ref 70–130)
GLUCOSE SERPL-MCNC: 202 MG/DL (ref 65–99)
MAGNESIUM SERPL-MCNC: 2.2 MG/DL (ref 1.6–2.6)
PHOSPHATE SERPL-MCNC: 3.4 MG/DL (ref 2.5–4.5)
POTASSIUM SERPL-SCNC: 4.1 MMOL/L (ref 3.5–5.2)
SODIUM SERPL-SCNC: 137 MMOL/L (ref 136–145)

## 2021-03-06 PROCEDURE — 25010000002 ENOXAPARIN PER 10 MG: Performed by: INTERNAL MEDICINE

## 2021-03-06 PROCEDURE — 99232 SBSQ HOSP IP/OBS MODERATE 35: CPT | Performed by: NURSE PRACTITIONER

## 2021-03-06 PROCEDURE — 25010000002 NA FERRIC GLUC CPLX PER 12.5 MG: Performed by: INTERNAL MEDICINE

## 2021-03-06 PROCEDURE — 82962 GLUCOSE BLOOD TEST: CPT

## 2021-03-06 PROCEDURE — 63710000001 INSULIN REGULAR HUMAN PER 5 UNITS: Performed by: INTERNAL MEDICINE

## 2021-03-06 PROCEDURE — 80069 RENAL FUNCTION PANEL: CPT | Performed by: INTERNAL MEDICINE

## 2021-03-06 PROCEDURE — 83735 ASSAY OF MAGNESIUM: CPT | Performed by: INTERNAL MEDICINE

## 2021-03-06 RX ADMIN — HYDROCHLOROTHIAZIDE 12.5 MG: 12.5 CAPSULE ORAL at 09:22

## 2021-03-06 RX ADMIN — LANSOPRAZOLE 30 MG: KIT at 12:57

## 2021-03-06 RX ADMIN — LEVOTHYROXINE SODIUM 88 MCG: 88 TABLET ORAL at 06:54

## 2021-03-06 RX ADMIN — INSULIN HUMAN 2 UNITS: 100 INJECTION, SOLUTION PARENTERAL at 18:27

## 2021-03-06 RX ADMIN — INSULIN HUMAN 3 UNITS: 100 INJECTION, SOLUTION PARENTERAL at 13:27

## 2021-03-06 RX ADMIN — INSULIN HUMAN 2 UNITS: 100 INJECTION, SOLUTION PARENTERAL at 01:24

## 2021-03-06 RX ADMIN — SODIUM CHLORIDE, PRESERVATIVE FREE 10 ML: 5 INJECTION INTRAVENOUS at 21:56

## 2021-03-06 RX ADMIN — POTASSIUM CHLORIDE 30 MEQ: 1.5 POWDER, FOR SOLUTION ORAL at 09:22

## 2021-03-06 RX ADMIN — SENNOSIDES 10 ML: 8.8 LIQUID ORAL at 09:22

## 2021-03-06 RX ADMIN — THIAMINE HCL TAB 100 MG 100 MG: 100 TAB at 09:22

## 2021-03-06 RX ADMIN — INSULIN HUMAN 2 UNITS: 100 INJECTION, SOLUTION PARENTERAL at 23:49

## 2021-03-06 RX ADMIN — SODIUM CHLORIDE, PRESERVATIVE FREE 10 ML: 5 INJECTION INTRAVENOUS at 09:24

## 2021-03-06 RX ADMIN — ENOXAPARIN SODIUM 40 MG: 40 INJECTION SUBCUTANEOUS at 09:23

## 2021-03-06 RX ADMIN — DIAZEPAM 15 MG: 5 TABLET ORAL at 21:56

## 2021-03-06 RX ADMIN — Medication 1 TABLET: at 09:25

## 2021-03-06 RX ADMIN — FOLIC ACID 1 MG: 1 TABLET ORAL at 09:23

## 2021-03-06 RX ADMIN — CARVEDILOL 6.25 MG: 6.25 TABLET, FILM COATED ORAL at 09:22

## 2021-03-06 RX ADMIN — Medication 10 MG: at 21:56

## 2021-03-06 RX ADMIN — CARVEDILOL 6.25 MG: 6.25 TABLET, FILM COATED ORAL at 18:28

## 2021-03-06 RX ADMIN — LANSOPRAZOLE 30 MG: KIT at 18:09

## 2021-03-06 RX ADMIN — INSULIN HUMAN 2 UNITS: 100 INJECTION, SOLUTION PARENTERAL at 06:46

## 2021-03-06 RX ADMIN — SODIUM CHLORIDE 125 MG: 9 INJECTION, SOLUTION INTRAVENOUS at 12:55

## 2021-03-06 NOTE — PROGRESS NOTES
"Progressive Care Unit Follow Up      Hospital:  LOS: 13 days   Ms. Elvia Montero, 45 y.o. female is followed for:   Suicide attempt (CMS/[facility])        History of present illness::   Elvia Montero is a 45 y.o. female who is [employer] with PMH GERD, Hypothyroidism, Gastroparesis, Bipolar DO and OCD who was admitted 2/21 with a suicide attempt by drinking 1/2 cup Mr. John, drinking a bottle of wine, ingesting several Klonopin and Seroquel.   She had N/V immediately following ingestion.  GI was consulted for possible esophageal injury.  She underwent EGD 2/22 that revealed Grade C Esophagitis with minimal injury and inflamed uvula.  She developed alcohol withdrawal and was placed on CIWA, scheduled Valium, Vitamins and Precedex infusion.  She had c/o pain and was also on Dilaudid PCA. She was also placed on Seroquel, Melatonin and prn Geodon.  Home Neurontin was restarted.  She developed a fever and completed a course of Zosyn for possible aspiration.  She failed a FEES 3/3 with severe pharyngeal dysphagia due to diffuse edema w/copious secretions.  NG was changed to NJ and she was placed on TF, which she is tolerating well.  Nephrology was consulted for Hypernatremia with high urinary output.  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  She was placed on D5, Hydrochlorothiazide and given DDAVP.  She was also started on IV iron for Fe Deficiency Anemia.  Her withdrawal improved and she became more oriented.  She is currently only on Valium prn and hasn't needed it.  She was transferred to the PCU 3/4.      Subjective   Interval History:  She is alert and oriented this am, but feels \"spacey\" or lightheaded.  Nephrology signed off today.  She has a sitter at bedside.  She is on room air.  She is tolerating TF and not ready for repeat dysphagia eval per SLP.  Her voice is not hoarse or weak.   She is very tearful because she hasn't slept well for a few days.         The " patient's past medical, surgical and social history were reviewed and updated in Epic as appropriate.     Objective     Infusions:     Medications:  carvedilol, 6.25 mg, Nasogastric, BID With Meals  enoxaparin, 40 mg, Subcutaneous, Q24H  ferric gluconate, 125 mg, Intravenous, Once  folic acid, 1 mg, Nasogastric, Daily  hydroCHLOROthiazide, 12.5 mg, Nasogastric, Daily  insulin regular, 0-7 Units, Subcutaneous, Q6H  lansoprazole, 30 mg, Nasogastric, BID AC  levothyroxine, 88 mcg, Nasogastric, Q AM  melatonin, 10 mg, Nasogastric, Nightly  multivitamin chewable, 1 tablet, Nasogastric, Daily  potassium chloride, 30 mEq, Nasogastric, Daily  sennosides, 10 mL, Nasogastric, BID  sodium chloride, 10 mL, Intravenous, Q12H  thiamine, 100 mg, Nasogastric, Daily      I reviewed the patient's medications.    Vital Sign Min/Max for last 24 hours  Temp  Min: 96 °F (35.6 °C)  Max: 97.5 °F (36.4 °C)   BP  Min: 139/82  Max: 167/117   Pulse  Min: 71  Max: 97   Resp  Min: 16  Max: 20   SpO2  Min: 90 %  Max: 98 %   Flow (L/min)  Min: 2  Max: 2       Input/Output for last 24 hour shift  03/05 0701 - 03/06 0700  In: 1445   Out: 1425 [Urine:1425]      ROS:   Constitutional: Negative for fever.  Respiratory: Negative for dyspnea.  Cardiovascular: Negative for chest pain.  Gastrointestinal: Negative for  nausea, vomiting and diarrhea.    Physical Exam  Vitals and nursing note reviewed.   Constitutional:       General: She is not in acute distress.  HENT:      Head: Normocephalic and atraumatic.      Nose:      Comments: Rt nare NJ tube     Mouth/Throat:      Mouth: Mucous membranes are moist.   Eyes:      Pupils: Pupils are equal, round, and reactive to light.   Cardiovascular:      Rate and Rhythm: Normal rate and regular rhythm.      Pulses: Normal pulses.      Heart sounds: Normal heart sounds. No murmur.   Pulmonary:      Effort: Pulmonary effort is normal. No respiratory distress.      Breath sounds: Normal breath sounds. No stridor. No  wheezing, rhonchi or rales.   Abdominal:      General: Bowel sounds are normal. There is no distension.      Palpations: Abdomen is soft.      Tenderness: There is no abdominal tenderness.   Musculoskeletal:         General: Normal range of motion.      Cervical back: Neck supple.      Right lower leg: No edema.      Left lower leg: No edema.   Skin:     General: Skin is warm and dry.      Capillary Refill: Capillary refill takes less than 2 seconds.   Neurological:      General: No focal deficit present.      Mental Status: She is alert and oriented to person, place, and time.   Psychiatric:         Mood and Affect: Mood normal.         Behavior: Behavior normal.       Results from last 7 days   Lab Units 03/05/21  0602 03/04/21  0423 03/03/21  0331   WBC 10*3/mm3 8.05 14.49* 18.38*   HEMOGLOBIN g/dL 10.0* 9.6* 10.2*   PLATELETS 10*3/mm3 634* 521* 441     Results from last 7 days   Lab Units 03/06/21  0758 03/05/21  2211 03/05/21  0602 03/04/21  0423   SODIUM mmol/L 137  --  139 137   POTASSIUM mmol/L 4.1 4.3 3.2* 4.1   CO2 mmol/L 24.0  --  29.0 27.0   BUN mg/dL 19  --  22* 19   CREATININE mg/dL 0.56*  --  0.64 0.61   MAGNESIUM mg/dL 2.2  --  2.0 2.2   PHOSPHORUS mg/dL 3.4  --  2.9 2.7   GLUCOSE mg/dL 202*  --  170* 266*     Estimated Creatinine Clearance: 143.7 mL/min (A) (by C-G formula based on SCr of 0.56 mg/dL (L)).          I reviewed the patient's new clinical results.  I reviewed the patient's new imaging results/reports including actual images and agree with reports.       Code Status and Medical Interventions:   Ordered at: 02/21/21 2046     Code Status:    CPR     Medical Interventions (Level of Support Prior to Arrest):    Full     Imaging Results (Last 24 Hours)     ** No results found for the last 24 hours. **        Assessment/Plan   Impression        Suicide attempt (CMS/HCC)    Toxic effect of caustic substance    Alcohol withdrawal    Nephrogenic diabetes insipidus (CMS/HCC)    Pharyngeal  dysphagia    Mild Esophagitis w/o injury    Probable Aspiration Pneumonia (CMS/[facility])    GERD (gastroesophageal reflux disease)    Hypothyroidism (acquired)    Refusal of blood transfusions as patient is [employer]    Bipolar 1 disorder (CMS/[facility])    Obsessive compulsive disorder       Plan        Suicide Attempt with ingestion of Mr. 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 / Hypernatremia  · On Hydrochlorothiazide, s/p D5 fluids  · S/p DDAVP  · Resolved  · Nephrology signed off 3/6  · Labs am    Pharyngeal Dysphagia / Mild Esophagitis w/minimal injury and inflamed uvula / GERD  · Failed FEES 3/3  · NJ tube placed and tolerating TF  · S/p steroids x1 dose    Fe Deficiency Anemia  · IV iron today per Nephrology    Hypothyroidism  · Continue Replacement    Baptism with refusal of blood products if needed    GI Prophylaxis:  PPI BID  DVT Prophylaxis: Lovenox 40 mg daily  Steroids: None  Nutrition: TF  Disposition: Transfer to [facility] and Hospitalist service    Plan of care and goals reviewed with multidisciplinary/antibiotic stewardship team during rounds.   I discussed the patient's findings and my recommendations with patient, nursing staff and primary care team     Time: 35 minutes, face to face, with the patient or on the gonzales coordinating care with other health care providers.     I spent > 50% percent of this time, counseling and discussing diagnosis, evaluation, current status, treatment options and Disposition.    MARTHA High, AGACNP-BC, FNP-BC  Pulmonary and Critical Care Service

## 2021-03-06 NOTE — PLAN OF CARE
Goal Outcome Evaluation:  Plan of Care Reviewed With: patient  Progress: no change   Vitals stable through shift. Awake throughout night. Denies pain. Tolerating tube feed well. Sitter at bedside.

## 2021-03-06 NOTE — PROGRESS NOTES
"   LOS: 13 days    Patient Care Team:  Provider, No Known as PCP - General  Provider, No Known as PCP - Family Medicine    Chief Complaint: Suicide attempt  Consulted for diabetes insipidus, hyper natremia, chronic lithium use  Subjective     Interval History:   No acute events overnight. No new complaints.    Review of Systems:   Alert today. Denies any SOA , CP , fever, rash, n/v. Abdominal pain.     Objective     Vital Sign Min/Max for last 24 hours  Temp  Min: 96 °F (35.6 °C)  Max: 97.5 °F (36.4 °C)   BP  Min: 139/82  Max: 167/117   Pulse  Min: 75  Max: 97   Resp  Min: 16  Max: 20   SpO2  Min: 95 %  Max: 95 %   Flow (L/min)  Min: 2  Max: 2   Weight  Min: 90.9 kg (200 lb 6.4 oz)  Max: 90.9 kg (200 lb 6.4 oz)     Flowsheet Rows      First Filed Value   Admission Height  170.2 cm (67\") Documented at 02/21/2021 1759   Admission Weight  81.6 kg (180 lb) Documented at 02/21/2021 1759          I/O this shift:  In: -   Out: 250 [Urine:250]  I/O last 3 completed shifts:  In: 3160 [Other:1131; NG/GT:2029]  Out: 1425 [Urine:1425]    Physical Exam:  General Appearance: Alert, NAD  Eyes: Pupils are equal.  EOMI  Neck: Supple no JVD.  Lungs: Clear auscultation,  nonlabored.  Heart: No gallop, murmur, rub, RRR.  Abdomen: Soft, positive bowel sounds, no organomegaly.  Extremities: No edema, no cyanosis.  Neuro: Awake, alert, no focal deficit.  Skin: Warm and dry.        WBC WBC   Date Value Ref Range Status   03/05/2021 8.05 3.40 - 10.80 10*3/mm3 Final   03/04/2021 14.49 (H) 3.40 - 10.80 10*3/mm3 Final      HGB Hemoglobin   Date Value Ref Range Status   03/05/2021 10.0 (L) 12.0 - 15.9 g/dL Final   03/04/2021 9.6 (L) 12.0 - 15.9 g/dL Final      HCT Hematocrit   Date Value Ref Range Status   03/05/2021 35.9 34.0 - 46.6 % Final   03/04/2021 34.5 34.0 - 46.6 % Final      Platlets No results found for: LABPLAT   MCV MCV   Date Value Ref Range Status   03/05/2021 86.9 79.0 - 97.0 fL Final   03/04/2021 88.2 79.0 - 97.0 fL Final    "       Sodium Sodium   Date Value Ref Range Status   03/06/2021 137 136 - 145 mmol/L Final   03/05/2021 139 136 - 145 mmol/L Final   03/04/2021 137 136 - 145 mmol/L Final   03/03/2021 137 136 - 145 mmol/L Final      Potassium Potassium   Date Value Ref Range Status   03/06/2021 4.1 3.5 - 5.2 mmol/L Final   03/05/2021 4.3 3.5 - 5.2 mmol/L Final     Comment:     Slight hemolysis detected by analyzer. Results may be affected.   03/05/2021 3.2 (L) 3.5 - 5.2 mmol/L Final   03/04/2021 4.1 3.5 - 5.2 mmol/L Final   03/03/2021 4.4 3.5 - 5.2 mmol/L Final      Chloride Chloride   Date Value Ref Range Status   03/06/2021 101 98 - 107 mmol/L Final   03/05/2021 99 98 - 107 mmol/L Final   03/04/2021 99 98 - 107 mmol/L Final      CO2 CO2   Date Value Ref Range Status   03/06/2021 24.0 22.0 - 29.0 mmol/L Final   03/05/2021 29.0 22.0 - 29.0 mmol/L Final   03/04/2021 27.0 22.0 - 29.0 mmol/L Final      BUN BUN   Date Value Ref Range Status   03/06/2021 19 6 - 20 mg/dL Final   03/05/2021 22 (H) 6 - 20 mg/dL Final   03/04/2021 19 6 - 20 mg/dL Final      Creatinine Creatinine   Date Value Ref Range Status   03/06/2021 0.56 (L) 0.57 - 1.00 mg/dL Final   03/05/2021 0.64 0.57 - 1.00 mg/dL Final   03/04/2021 0.61 0.57 - 1.00 mg/dL Final      Calcium Calcium   Date Value Ref Range Status   03/06/2021 9.9 8.6 - 10.5 mg/dL Final   03/05/2021 10.2 8.6 - 10.5 mg/dL Final   03/04/2021 10.0 8.6 - 10.5 mg/dL Final      PO4 No results found for: CAPO4   Albumin Albumin   Date Value Ref Range Status   03/06/2021 3.90 3.50 - 5.20 g/dL Final   03/05/2021 3.80 3.50 - 5.20 g/dL Final   03/04/2021 3.90 3.50 - 5.20 g/dL Final      Magnesium Magnesium   Date Value Ref Range Status   03/06/2021 2.2 1.6 - 2.6 mg/dL Final   03/05/2021 2.0 1.6 - 2.6 mg/dL Final   03/04/2021 2.2 1.6 - 2.6 mg/dL Final      Uric Acid No results found for: URICACID        Results Review:     I reviewed the patient's new clinical results.    carvedilol, 6.25 mg, Nasogastric, BID With  Meals  enoxaparin, 40 mg, Subcutaneous, Q24H  folic acid, 1 mg, Nasogastric, Daily  hydroCHLOROthiazide, 12.5 mg, Nasogastric, Daily  insulin regular, 0-7 Units, Subcutaneous, Q6H  lansoprazole, 30 mg, Nasogastric, BID AC  levothyroxine, 88 mcg, Nasogastric, Q AM  melatonin, 10 mg, Nasogastric, Nightly  multivitamin chewable, 1 tablet, Nasogastric, Daily  potassium chloride, 30 mEq, Nasogastric, Daily  sennosides, 10 mL, Nasogastric, BID  sodium chloride, 10 mL, Intravenous, Q12H  thiamine, 100 mg, Nasogastric, Daily           Medication Review: As noted above    Assessment/Plan       Alcohol withdrawal    Suicide attempt (CMS/[facility])    Refusal of blood transfusions as patient is [employer]    Toxic effect of caustic substance    Nephrogenic diabetes insipidus (CMS/[facility])    1.  Polyuria: Secondary to osmotic diuresis - Urine Osm 668 with normal serum sodium at presentation suggestive of osmotic diuresis plus urine osm X 24 hr urine volume is greater than 1000 mosmole - confirming osmotic diuresis rather than Water diuresis. Patient was on lithium now off. Resolved.   2.  Suicide attempt.  3.  HTN and tachycardia - much better  4. Absolute iron def - Tsat 6% absolute def.     Plan:  - Continue with current.    -IV iron.    Will sign off the service.       Koby Mcmahon MD  03/06/21  09:50 EST

## 2021-03-06 NOTE — PLAN OF CARE
Goal Outcome Evaluation:     Progress: improving  Outcome Summary: pt a/o no distress noted sitter 1:1 no distress noted     1855-pt moves to 625 no longer pcu pt

## 2021-03-07 LAB
ALBUMIN SERPL-MCNC: 4.3 G/DL (ref 3.5–5.2)
ALBUMIN/GLOB SERPL: 1.1 G/DL
ALP SERPL-CCNC: 100 U/L (ref 39–117)
ALT SERPL W P-5'-P-CCNC: 45 U/L (ref 1–33)
ANION GAP SERPL CALCULATED.3IONS-SCNC: 10 MMOL/L (ref 5–15)
AST SERPL-CCNC: 30 U/L (ref 1–32)
BILIRUB SERPL-MCNC: <0.2 MG/DL (ref 0–1.2)
BUN SERPL-MCNC: 16 MG/DL (ref 6–20)
BUN/CREAT SERPL: 27.6 (ref 7–25)
CALCIUM SPEC-SCNC: 10.5 MG/DL (ref 8.6–10.5)
CHLORIDE SERPL-SCNC: 103 MMOL/L (ref 98–107)
CO2 SERPL-SCNC: 26 MMOL/L (ref 22–29)
CREAT SERPL-MCNC: 0.58 MG/DL (ref 0.57–1)
DEPRECATED RDW RBC AUTO: 69.4 FL (ref 37–54)
ERYTHROCYTE [DISTWIDTH] IN BLOOD BY AUTOMATED COUNT: 22.8 % (ref 12.3–15.4)
GFR SERPL CREATININE-BSD FRML MDRD: 111 ML/MIN/1.73
GLOBULIN UR ELPH-MCNC: 3.9 GM/DL
GLUCOSE BLDC GLUCOMTR-MCNC: 175 MG/DL (ref 70–130)
GLUCOSE BLDC GLUCOMTR-MCNC: 187 MG/DL (ref 70–130)
GLUCOSE BLDC GLUCOMTR-MCNC: 188 MG/DL (ref 70–130)
GLUCOSE SERPL-MCNC: 206 MG/DL (ref 65–99)
HCT VFR BLD AUTO: 36.2 % (ref 34–46.6)
HGB BLD-MCNC: 10.4 G/DL (ref 12–15.9)
MAGNESIUM SERPL-MCNC: 2.2 MG/DL (ref 1.6–2.6)
MCH RBC QN AUTO: 24.8 PG (ref 26.6–33)
MCHC RBC AUTO-ENTMCNC: 28.7 G/DL (ref 31.5–35.7)
MCV RBC AUTO: 86.4 FL (ref 79–97)
PHOSPHATE SERPL-MCNC: 3.3 MG/DL (ref 2.5–4.5)
PLATELET # BLD AUTO: 855 10*3/MM3 (ref 140–450)
PMV BLD AUTO: 10.2 FL (ref 6–12)
POTASSIUM SERPL-SCNC: 4.6 MMOL/L (ref 3.5–5.2)
PROT SERPL-MCNC: 8.2 G/DL (ref 6–8.5)
RBC # BLD AUTO: 4.19 10*6/MM3 (ref 3.77–5.28)
SODIUM SERPL-SCNC: 139 MMOL/L (ref 136–145)
WBC # BLD AUTO: 9.23 10*3/MM3 (ref 3.4–10.8)

## 2021-03-07 PROCEDURE — 82962 GLUCOSE BLOOD TEST: CPT

## 2021-03-07 PROCEDURE — 80053 COMPREHEN METABOLIC PANEL: CPT | Performed by: NURSE PRACTITIONER

## 2021-03-07 PROCEDURE — 83735 ASSAY OF MAGNESIUM: CPT | Performed by: NURSE PRACTITIONER

## 2021-03-07 PROCEDURE — 94799 UNLISTED PULMONARY SVC/PX: CPT

## 2021-03-07 PROCEDURE — 25010000002 ENOXAPARIN PER 10 MG: Performed by: INTERNAL MEDICINE

## 2021-03-07 PROCEDURE — 63710000001 INSULIN REGULAR HUMAN PER 5 UNITS: Performed by: INTERNAL MEDICINE

## 2021-03-07 PROCEDURE — 99233 SBSQ HOSP IP/OBS HIGH 50: CPT | Performed by: INTERNAL MEDICINE

## 2021-03-07 PROCEDURE — 84100 ASSAY OF PHOSPHORUS: CPT | Performed by: NURSE PRACTITIONER

## 2021-03-07 PROCEDURE — 85027 COMPLETE CBC AUTOMATED: CPT | Performed by: NURSE PRACTITIONER

## 2021-03-07 RX ORDER — DIAZEPAM 5 MG/1
5 TABLET ORAL EVERY 6 HOURS PRN
Status: DISCONTINUED | OUTPATIENT
Start: 2021-03-07 | End: 2021-03-10

## 2021-03-07 RX ADMIN — ENOXAPARIN SODIUM 40 MG: 40 INJECTION SUBCUTANEOUS at 08:08

## 2021-03-07 RX ADMIN — SODIUM CHLORIDE, PRESERVATIVE FREE 10 ML: 5 INJECTION INTRAVENOUS at 08:10

## 2021-03-07 RX ADMIN — LEVOTHYROXINE SODIUM 88 MCG: 88 TABLET ORAL at 06:21

## 2021-03-07 RX ADMIN — THIAMINE HCL TAB 100 MG 100 MG: 100 TAB at 08:10

## 2021-03-07 RX ADMIN — HYDROCHLOROTHIAZIDE 12.5 MG: 12.5 CAPSULE ORAL at 08:09

## 2021-03-07 RX ADMIN — LANSOPRAZOLE 30 MG: KIT at 06:21

## 2021-03-07 RX ADMIN — DIAZEPAM 15 MG: 5 TABLET ORAL at 08:08

## 2021-03-07 RX ADMIN — Medication 10 MG: at 22:09

## 2021-03-07 RX ADMIN — SENNOSIDES 10 ML: 8.8 LIQUID ORAL at 11:09

## 2021-03-07 RX ADMIN — Medication 1 TABLET: at 09:22

## 2021-03-07 RX ADMIN — POTASSIUM CHLORIDE 30 MEQ: 1.5 POWDER, FOR SOLUTION ORAL at 08:08

## 2021-03-07 RX ADMIN — CARVEDILOL 6.25 MG: 6.25 TABLET, FILM COATED ORAL at 17:11

## 2021-03-07 RX ADMIN — SODIUM CHLORIDE, PRESERVATIVE FREE 10 ML: 5 INJECTION INTRAVENOUS at 22:09

## 2021-03-07 RX ADMIN — DIAZEPAM 5 MG: 5 TABLET ORAL at 18:14

## 2021-03-07 RX ADMIN — FOLIC ACID 1 MG: 1 TABLET ORAL at 08:10

## 2021-03-07 RX ADMIN — INSULIN HUMAN 2 UNITS: 100 INJECTION, SOLUTION PARENTERAL at 18:25

## 2021-03-07 RX ADMIN — INSULIN HUMAN 2 UNITS: 100 INJECTION, SOLUTION PARENTERAL at 06:21

## 2021-03-07 RX ADMIN — SODIUM CHLORIDE, PRESERVATIVE FREE 10 ML: 5 INJECTION INTRAVENOUS at 08:12

## 2021-03-07 RX ADMIN — LANSOPRAZOLE 30 MG: KIT at 17:11

## 2021-03-07 RX ADMIN — SENNOSIDES 10 ML: 8.8 LIQUID ORAL at 22:09

## 2021-03-07 RX ADMIN — CARVEDILOL 6.25 MG: 6.25 TABLET, FILM COATED ORAL at 08:09

## 2021-03-07 RX ADMIN — INSULIN HUMAN 2 UNITS: 100 INJECTION, SOLUTION PARENTERAL at 12:16

## 2021-03-07 NOTE — NURSING NOTE
Pt SR on monitor. RA. BP elevated toward end of shift; evening medication given. Pt has no complaints at this time. Will continue to monitor.

## 2021-03-07 NOTE — PLAN OF CARE
Goal Outcome Evaluation:     Progress: no change  Outcome Summary: VSS. patient anxious, and elevated BP end of shift. BP medication given and resolved hypoertensive episode. RA. UAL. PRN xanax given. continue to POC

## 2021-03-07 NOTE — PROGRESS NOTES
[facility] Medicine Services  PROGRESS NOTE    Patient Name: Elvia Montero  : 1975  MRN: [id number]    Date of Admission: 2021  Primary Care Physician: Provider, No Known    Subjective   Subjective     CC:  Follow-up ICU stepdown, suicide attempt    HPI:  Throat feels less sore, denies breathing difficulty.  Tolerating tube feeds.  Is hopeful that she will pass her swallow evaluation tomorrow.  Denies other acute complaints.  Reiterates plan to transition to the [place]    ROS:  Gen- No fevers, chills  CV- No chest pain, palpitations  Resp- No cough, dyspnea  GI- No N/V/D, abd pain    Objective   Objective     Vital Signs:   Temp:  [96.5 °F (35.8 °C)-97.6 °F (36.4 °C)] 97.6 °F (36.4 °C)  Heart Rate:  [71-87] 82  Resp:  [16-20] 16  BP: (120-177)/() 160/94        Physical Exam:  Constitutional: No acute distress, awake, alert, sitting up in bed  HENT: NCAT, mucous membranes moist, NG tube in nare  Respiratory: Clear to auscultation bilaterally, respiratory effort normal   Cardiovascular: RRR, no murmurs, rubs, or gallops, palpable radial pulse bilaterally  Gastrointestinal: Positive bowel sounds, soft, nontender, nondistended  Musculoskeletal: No bilateral ankle edema  Psychiatric: Appropriate affect, cooperative  Neurologic: Oriented x 3, strength symmetric in all extremities, speech clear    Results Reviewed:  Results from last 7 days   Lab Units 21  0602 21  0423 21  0331   WBC 10*3/mm3 8.05 14.49* 18.38*   HEMOGLOBIN g/dL 10.0* 9.6* 10.2*   HEMATOCRIT % 35.9 34.5 36.9   PLATELETS 10*3/mm3 634* 521* 441     Results from last 7 days   Lab Units 21  0758 21  2211 21  0602 21  0423 21  1159 21  0421   SODIUM mmol/L 137  --  139 137  --  141   POTASSIUM mmol/L 4.1 4.3 3.2* 4.1   < > 3.7   CHLORIDE mmol/L 101  --  99 99   < > 101   CO2 mmol/L 24.0  --  29.0 27.0   < > 27.0   BUN mg/dL 19  --  22* 19   < > 12   CREATININE  mg/dL 0.56*  --  0.64 0.61   < > 0.81   GLUCOSE mg/dL 202*  --  170* 266*   < > 154*   CALCIUM mg/dL 9.9  --  10.2 10.0   < > 10.2   ALT (SGPT) U/L  --   --   --   --   --  53*   AST (SGOT) U/L  --   --   --   --   --  41*    < > = values in this interval not displayed.     Estimated Creatinine Clearance: 145.5 mL/min (A) (by C-G formula based on SCr of 0.56 mg/dL (L)).    Microbiology Results Abnormal     Procedure Component Value - Date/Time    Urine Culture - Urine, Urine, Clean Catch [[id number]]  (Normal) Collected: 02/26/21 1056    Lab Status: Final result Specimen: Urine, Clean Catch Updated: 02/27/21 1207     Urine Culture No growth    MRSA Screen, PCR (Inpatient) - Swab, Nares [[id number]]  (Normal) Collected: 02/24/21 1442    Lab Status: Final result Specimen: Swab from Nares Updated: 02/24/21 1908     MRSA PCR Negative    Narrative:      MRSA Negative    COVID PRE-OP / PRE-PROCEDURE SCREENING ORDER (NO ISOLATION) - Swab, Nasopharynx [[id number]]  (Normal) Collected: 02/21/21 2252    Lab Status: Final result Specimen: Swab from Nasopharynx Updated: 02/21/21 2324    Narrative:      The following orders were created for panel order COVID PRE-OP / PRE-PROCEDURE SCREENING ORDER (NO ISOLATION) - Swab, Nasopharynx.  Procedure                               Abnormality         Status                     ---------                               -----------         ------                     COVID-19 and FLU A/B PCR...[[id number]]  Normal              Final result                 Please view results for these tests on the individual orders.    COVID-19 and FLU A/B PCR - Swab, Nasopharynx [[id number]]  (Normal) Collected: 02/21/21 2252    Lab Status: Final result Specimen: Swab from Nasopharynx Updated: 02/21/21 2324     COVID19 Not Detected     Influenza A PCR Not Detected     Influenza B PCR Not Detected    Narrative:      Fact sheet for providers: https://www.fda.gov/media/063776/download    Fact sheet for patients:  https://www.fda.gov/media/250728/download    Test performed by PCR.          Imaging Results (Last 24 Hours)     ** No results found for the last 24 hours. **              I have reviewed the medications:  Scheduled Meds:carvedilol, 6.25 mg, Nasogastric, BID With Meals  enoxaparin, 40 mg, Subcutaneous, Q24H  folic acid, 1 mg, Nasogastric, Daily  hydroCHLOROthiazide, 12.5 mg, Nasogastric, Daily  insulin regular, 0-7 Units, Subcutaneous, Q6H  lansoprazole, 30 mg, Nasogastric, BID AC  levothyroxine, 88 mcg, Nasogastric, Q AM  melatonin, 10 mg, Nasogastric, Nightly  multivitamin chewable, 1 tablet, Nasogastric, Daily  potassium chloride, 30 mEq, Nasogastric, Daily  sennosides, 10 mL, Nasogastric, BID  sodium chloride, 10 mL, Intravenous, Q12H  thiamine, 100 mg, Nasogastric, Daily      Continuous Infusions:   PRN Meds:.•  acetaminophen  •  diazePAM  •  influenza vaccine  •  magnesium sulfate  •  ondansetron  •  [DISCONTINUED] potassium chloride **OR** potassium chloride **OR** potassium chloride  •  sodium chloride    Assessment/Plan   Assessment & Plan     Active Hospital Problems    Diagnosis  POA   • **Suicide attempt (CMS/[facility]) [T14.91XA]  Yes   • Bipolar 1 disorder (CMS/[facility]) [F31.9]  Yes   • GERD (gastroesophageal reflux disease) [K21.9]  Yes   • Pharyngeal dysphagia [R13.13]  Yes   • Probable Aspiration Pneumonia (CMS/[facility]) [J69.0]  Yes   • Hypothyroidism (acquired) [E03.9]  Yes   • Nephrogenic diabetes insipidus (CMS/[facility]) [N25.1]  No   • Alcohol withdrawal [F10.239]  No   • Refusal of blood transfusions as patient is [employer] [Z53.1]  Not Applicable   • Toxic effect of caustic substance [T54.91XA]  Yes   • Mild Esophagitis w/o injury [K20.90]  Yes   • Obsessive compulsive disorder [F42.9]  Yes      Resolved Hospital Problems   No resolved problems to display.        Brief Hospital Course to date:  Elvia Montero is a 45 y.o. female [employer] with bipolar disorder/OCD admitted 2/21/2021 following  suicide attempt (1/2 cup Mr. John, bottle of wine, Klonopin, Seroquel) seen by GI with EGD 2/22/2021 with esophagitis and minimal injury; subsequently with alcohol withdrawal, treated with Zosyn for aspiration pneumonia; she failed FEES 3/20/2021 with severe pharyngeal dysphagia due to edema/secretions and has been with an NG tube and on tube feeds since that time, also complicated with hyponatremia per nephrology thought to be nephrogenic diabetes insipidus from lithium (post D5, HCTZ, DDAVP) transferred to the PCU 3/4/2021 and subsequently the hospitalist service on 3/7/2021    The following problems new to me today    Assessment/plan    Severe pharyngeal dysphagia  Esophagitis due to toxic injury  -Failed FEES 3/3/2021  -Continue tube feeds via Keofeed  -SLP following, possible reevaluation tomorrow?    Suicide attempt  -Ingestion Mr. John, bottle of wine, Klonopin, Seroquel  -Has sitter, suicide precautions  -Agreeable to discharge to inpatient psych facility, has been at the [facility] previously, addiction team following    Thrombocythemia  -Platelets precipitously increasing, recheck in the a.m., if continues to elevate will consider hematology consultation    S/p alcohol withdrawal  Alcohol abuse with dependence  -Very infrequent need for Valium, decrease dose    S/p nephrogenic diabetes insipidus with hypernatremia  -Felt to be related to chronic lithium use  -S/p DDAVP  -Continue HCTZ, started by nephrology  -Nephrology signed off 3/6/2021    Iron deficiency anemia  -S/p IV iron per nephrology    Hypothyroidism  -Continue Synthroid    [facility], refuses blood products per prior documentation    DVT Prophylaxis: Lovenox      Disposition: I expect the patient to be discharged this week to inpatient psychiatric facility, remaining hospital problems include thrombocythemia which is likely reactive and need for long-term enteral nutrition.    CODE STATUS:   Code Status and Medical Interventions:   Ordered  at: 02/21/21 2046     Code Status:    CPR     Medical Interventions (Level of Support Prior to Arrest):    Full       Fred Langley,   03/07/21

## 2021-03-07 NOTE — PLAN OF CARE
Goal Outcome Evaluation:  NSR. RA. Up with standby assist to the bathroom. SBP between 130s-160s. No complaints throughout shift. Will continue to monitor.

## 2021-03-08 ENCOUNTER — ANCILLARY PROCEDURE (OUTPATIENT)
Dept: SPEECH THERAPY | Facility: HOSPITAL | Age: 46
End: 2021-03-08

## 2021-03-08 LAB
ALBUMIN SERPL-MCNC: 3.7 G/DL (ref 3.5–5.2)
ALBUMIN/GLOB SERPL: 1 G/DL
ALP SERPL-CCNC: 89 U/L (ref 39–117)
ALT SERPL W P-5'-P-CCNC: 52 U/L (ref 1–33)
ANION GAP SERPL CALCULATED.3IONS-SCNC: 14 MMOL/L (ref 5–15)
AST SERPL-CCNC: 31 U/L (ref 1–32)
BASOPHILS # BLD AUTO: 0.07 10*3/MM3 (ref 0–0.2)
BASOPHILS NFR BLD AUTO: 0.9 % (ref 0–1.5)
BILIRUB SERPL-MCNC: <0.2 MG/DL (ref 0–1.2)
BUN SERPL-MCNC: 15 MG/DL (ref 6–20)
BUN/CREAT SERPL: 25.9 (ref 7–25)
CALCIUM SPEC-SCNC: 9.5 MG/DL (ref 8.6–10.5)
CHLORIDE SERPL-SCNC: 102 MMOL/L (ref 98–107)
CO2 SERPL-SCNC: 22 MMOL/L (ref 22–29)
CREAT SERPL-MCNC: 0.58 MG/DL (ref 0.57–1)
CRP SERPL-MCNC: 1.97 MG/DL (ref 0–0.5)
DEPRECATED RDW RBC AUTO: 72.9 FL (ref 37–54)
EOSINOPHIL # BLD AUTO: 0.1 10*3/MM3 (ref 0–0.4)
EOSINOPHIL NFR BLD AUTO: 1.2 % (ref 0.3–6.2)
ERYTHROCYTE [DISTWIDTH] IN BLOOD BY AUTOMATED COUNT: 23.4 % (ref 12.3–15.4)
GFR SERPL CREATININE-BSD FRML MDRD: 111 ML/MIN/1.73
GLOBULIN UR ELPH-MCNC: 3.6 GM/DL
GLUCOSE BLDC GLUCOMTR-MCNC: 137 MG/DL (ref 70–130)
GLUCOSE BLDC GLUCOMTR-MCNC: 148 MG/DL (ref 70–130)
GLUCOSE BLDC GLUCOMTR-MCNC: 194 MG/DL (ref 70–130)
GLUCOSE BLDC GLUCOMTR-MCNC: 196 MG/DL (ref 70–130)
GLUCOSE BLDC GLUCOMTR-MCNC: 201 MG/DL (ref 70–130)
GLUCOSE SERPL-MCNC: 170 MG/DL (ref 65–99)
HCT VFR BLD AUTO: 37 % (ref 34–46.6)
HGB BLD-MCNC: 10.3 G/DL (ref 12–15.9)
IMM GRANULOCYTES # BLD AUTO: 0.11 10*3/MM3 (ref 0–0.05)
IMM GRANULOCYTES NFR BLD AUTO: 1.4 % (ref 0–0.5)
LYMPHOCYTES # BLD AUTO: 1.62 10*3/MM3 (ref 0.7–3.1)
LYMPHOCYTES NFR BLD AUTO: 20 % (ref 19.6–45.3)
MAGNESIUM SERPL-MCNC: 2.1 MG/DL (ref 1.6–2.6)
MCH RBC QN AUTO: 24.5 PG (ref 26.6–33)
MCHC RBC AUTO-ENTMCNC: 27.8 G/DL (ref 31.5–35.7)
MCV RBC AUTO: 88.1 FL (ref 79–97)
MONOCYTES # BLD AUTO: 0.52 10*3/MM3 (ref 0.1–0.9)
MONOCYTES NFR BLD AUTO: 6.4 % (ref 5–12)
NEUTROPHILS NFR BLD AUTO: 5.66 10*3/MM3 (ref 1.7–7)
NEUTROPHILS NFR BLD AUTO: 70.1 % (ref 42.7–76)
NRBC BLD AUTO-RTO: 0 /100 WBC (ref 0–0.2)
PHOSPHATE SERPL-MCNC: 3.6 MG/DL (ref 2.5–4.5)
PLATELET # BLD AUTO: 778 10*3/MM3 (ref 140–450)
PMV BLD AUTO: 10.1 FL (ref 6–12)
POTASSIUM SERPL-SCNC: 3.6 MMOL/L (ref 3.5–5.2)
PREALB SERPL-MCNC: 35.6 MG/DL (ref 20–40)
PROT SERPL-MCNC: 7.3 G/DL (ref 6–8.5)
RBC # BLD AUTO: 4.2 10*6/MM3 (ref 3.77–5.28)
SODIUM SERPL-SCNC: 138 MMOL/L (ref 136–145)
TRIGL SERPL-MCNC: 192 MG/DL (ref 0–150)
WBC # BLD AUTO: 8.08 10*3/MM3 (ref 3.4–10.8)

## 2021-03-08 PROCEDURE — 63710000001 INSULIN REGULAR HUMAN PER 5 UNITS: Performed by: INTERNAL MEDICINE

## 2021-03-08 PROCEDURE — 86140 C-REACTIVE PROTEIN: CPT | Performed by: INTERNAL MEDICINE

## 2021-03-08 PROCEDURE — 84478 ASSAY OF TRIGLYCERIDES: CPT | Performed by: INTERNAL MEDICINE

## 2021-03-08 PROCEDURE — 83735 ASSAY OF MAGNESIUM: CPT | Performed by: INTERNAL MEDICINE

## 2021-03-08 PROCEDURE — 85025 COMPLETE CBC W/AUTO DIFF WBC: CPT | Performed by: INTERNAL MEDICINE

## 2021-03-08 PROCEDURE — 84134 ASSAY OF PREALBUMIN: CPT | Performed by: INTERNAL MEDICINE

## 2021-03-08 PROCEDURE — 80053 COMPREHEN METABOLIC PANEL: CPT | Performed by: INTERNAL MEDICINE

## 2021-03-08 PROCEDURE — 92612 ENDOSCOPY SWALLOW (FEES) VID: CPT

## 2021-03-08 PROCEDURE — 84100 ASSAY OF PHOSPHORUS: CPT | Performed by: INTERNAL MEDICINE

## 2021-03-08 PROCEDURE — 25010000002 ENOXAPARIN PER 10 MG: Performed by: INTERNAL MEDICINE

## 2021-03-08 PROCEDURE — 82962 GLUCOSE BLOOD TEST: CPT

## 2021-03-08 PROCEDURE — 99232 SBSQ HOSP IP/OBS MODERATE 35: CPT | Performed by: NURSE PRACTITIONER

## 2021-03-08 RX ADMIN — ACETAMINOPHEN 649.6 MG: 160 SOLUTION ORAL at 10:51

## 2021-03-08 RX ADMIN — SODIUM CHLORIDE, PRESERVATIVE FREE 10 ML: 5 INJECTION INTRAVENOUS at 10:52

## 2021-03-08 RX ADMIN — LANSOPRAZOLE 30 MG: KIT at 18:15

## 2021-03-08 RX ADMIN — LANSOPRAZOLE 30 MG: KIT at 10:53

## 2021-03-08 RX ADMIN — LEVOTHYROXINE SODIUM 88 MCG: 88 TABLET ORAL at 06:09

## 2021-03-08 RX ADMIN — SENNOSIDES 10 ML: 8.8 LIQUID ORAL at 10:51

## 2021-03-08 RX ADMIN — DIAZEPAM 5 MG: 5 TABLET ORAL at 01:03

## 2021-03-08 RX ADMIN — HYDROCHLOROTHIAZIDE 12.5 MG: 12.5 CAPSULE ORAL at 10:51

## 2021-03-08 RX ADMIN — THIAMINE HCL TAB 100 MG 100 MG: 100 TAB at 10:54

## 2021-03-08 RX ADMIN — CARVEDILOL 6.25 MG: 6.25 TABLET, FILM COATED ORAL at 18:15

## 2021-03-08 RX ADMIN — SODIUM CHLORIDE, PRESERVATIVE FREE 10 ML: 5 INJECTION INTRAVENOUS at 22:44

## 2021-03-08 RX ADMIN — ENOXAPARIN SODIUM 40 MG: 40 INJECTION SUBCUTANEOUS at 10:51

## 2021-03-08 RX ADMIN — Medication 10 MG: at 22:38

## 2021-03-08 RX ADMIN — INSULIN HUMAN 3 UNITS: 100 INJECTION, SOLUTION PARENTERAL at 01:03

## 2021-03-08 RX ADMIN — SENNOSIDES 10 ML: 8.8 LIQUID ORAL at 22:47

## 2021-03-08 RX ADMIN — INSULIN HUMAN 2 UNITS: 100 INJECTION, SOLUTION PARENTERAL at 06:09

## 2021-03-08 RX ADMIN — POTASSIUM CHLORIDE 30 MEQ: 1.5 POWDER, FOR SOLUTION ORAL at 10:52

## 2021-03-08 RX ADMIN — DIAZEPAM 5 MG: 5 TABLET ORAL at 12:29

## 2021-03-08 RX ADMIN — DIAZEPAM 5 MG: 5 TABLET ORAL at 22:38

## 2021-03-08 RX ADMIN — Medication 1 TABLET: at 10:50

## 2021-03-08 RX ADMIN — FOLIC ACID 1 MG: 1 TABLET ORAL at 10:53

## 2021-03-08 RX ADMIN — DIAZEPAM 5 MG: 5 TABLET ORAL at 06:10

## 2021-03-08 RX ADMIN — CARVEDILOL 6.25 MG: 6.25 TABLET, FILM COATED ORAL at 10:51

## 2021-03-08 RX ADMIN — INSULIN HUMAN 2 UNITS: 100 INJECTION, SOLUTION PARENTERAL at 12:27

## 2021-03-08 NOTE — PLAN OF CARE
Goal Outcome Evaluation:  Plan of Care Reviewed With: patient  Progress: improving  Outcome Summary: VSS. passed fees ecam today. Pulled Kiofeed. Diet placed. obtained shower order for patient. RA. cont POC

## 2021-03-08 NOTE — PROGRESS NOTES
[facility] Medicine Services  PROGRESS NOTE    Patient Name: Elvia Montero  : 1975  MRN: [id number]    Date of Admission: 2021  Primary Care Physician: Provider, No Known    Subjective   Subjective     CC:  Follow up ICU stepdown, suicide attempt     HPI:  Patient sitting on side of bed in NAD. She slept well. Moving around ok. Sitter at bedside. She states throat feels better. Patient denies any Suicidal ideation at this time.     ROS:  Gen- No fevers, chills  CV- No chest pain, palpitations  Resp- No cough, dyspnea  GI- No N/V/D, abd pain         Objective   Objective     Vital Signs:   Temp:  [97.6 °F (36.4 °C)-99 °F (37.2 °C)] 98.5 °F (36.9 °C)  Heart Rate:  [65-96] 95  Resp:  [16] 16  BP: (112-170)/() 112/84        Physical Exam:  Constitutional: No acute distress, awake, alert  HENT: NCAT, mucous membranes moist, right nare keofeed   Respiratory: Clear to auscultation bilaterally, respiratory effort normal, room air    Cardiovascular: RRR, no murmurs, rubs, or gallops  Gastrointestinal: Positive bowel sounds, soft, nontender, nondistended  Musculoskeletal: No bilateral ankle edema  Psychiatric: Appropriate affect, cooperative  Neurologic: Oriented x 3, strength symmetric in all extremities, Cranial Nerves grossly intact to confrontation, speech clear  Skin: No rashes      Results Reviewed:  Results from last 7 days   Lab Units 21  0659 21  0855 21  0602   WBC 10*3/mm3 8.08 9.23 8.05   HEMOGLOBIN g/dL 10.3* 10.4* 10.0*   HEMATOCRIT % 37.0 36.2 35.9   PLATELETS 10*3/mm3 778* 855* 634*     Results from last 7 days   Lab Units 21  0659 21  0855 21  0758   SODIUM mmol/L 138 139 137   POTASSIUM mmol/L 3.6 4.6 4.1   CHLORIDE mmol/L 102 103 101   CO2 mmol/L 22.0 26.0 24.0   BUN mg/dL 15 16 19   CREATININE mg/dL 0.58 0.58 0.56*   GLUCOSE mg/dL 170* 206* 202*   CALCIUM mg/dL 9.5 10.5 9.9   ALT (SGPT) U/L 52* 45*  --    AST (SGOT) U/L 31  30  --      Estimated Creatinine Clearance: 139.7 mL/min (by C-G formula based on SCr of 0.58 mg/dL).    Microbiology Results Abnormal     Procedure Component Value - Date/Time    Urine Culture - Urine, Urine, Clean Catch [[id number]]  (Normal) Collected: 02/26/21 1056    Lab Status: Final result Specimen: Urine, Clean Catch Updated: 02/27/21 1207     Urine Culture No growth    MRSA Screen, PCR (Inpatient) - Swab, Nares [[id number]]  (Normal) Collected: 02/24/21 1442    Lab Status: Final result Specimen: Swab from Nares Updated: 02/24/21 1908     MRSA PCR Negative    Narrative:      MRSA Negative    COVID PRE-OP / PRE-PROCEDURE SCREENING ORDER (NO ISOLATION) - Swab, Nasopharynx [[id number]]  (Normal) Collected: 02/21/21 2252    Lab Status: Final result Specimen: Swab from Nasopharynx Updated: 02/21/21 2324    Narrative:      The following orders were created for panel order COVID PRE-OP / PRE-PROCEDURE SCREENING ORDER (NO ISOLATION) - Swab, Nasopharynx.  Procedure                               Abnormality         Status                     ---------                               -----------         ------                     COVID-19 and FLU A/B PCR...[[id number]]  Normal              Final result                 Please view results for these tests on the individual orders.    COVID-19 and FLU A/B PCR - Swab, Nasopharynx [[id number]]  (Normal) Collected: 02/21/21 2252    Lab Status: Final result Specimen: Swab from Nasopharynx Updated: 02/21/21 2324     COVID19 Not Detected     Influenza A PCR Not Detected     Influenza B PCR Not Detected    Narrative:      Fact sheet for providers: https://www.fda.gov/media/690557/download    Fact sheet for patients: https://www.fda.gov/media/495399/download    Test performed by PCR.          Imaging Results (Last 24 Hours)     ** No results found for the last 24 hours. **              I have reviewed the medications:  Scheduled Meds:carvedilol, 6.25 mg, Nasogastric, BID With  Meals  enoxaparin, 40 mg, Subcutaneous, Q24H  folic acid, 1 mg, Nasogastric, Daily  hydroCHLOROthiazide, 12.5 mg, Nasogastric, Daily  insulin regular, 0-7 Units, Subcutaneous, Q6H  lansoprazole, 30 mg, Nasogastric, BID AC  levothyroxine, 88 mcg, Nasogastric, Q AM  melatonin, 10 mg, Nasogastric, Nightly  multivitamin chewable, 1 tablet, Nasogastric, Daily  potassium chloride, 30 mEq, Nasogastric, Daily  sennosides, 10 mL, Nasogastric, BID  sodium chloride, 10 mL, Intravenous, Q12H  thiamine, 100 mg, Nasogastric, Daily      Continuous Infusions:   PRN Meds:.•  acetaminophen  •  diazePAM  •  influenza vaccine  •  magnesium sulfate  •  ondansetron  •  [DISCONTINUED] potassium chloride **OR** potassium chloride **OR** potassium chloride  •  sodium chloride    Assessment/Plan   Assessment & Plan     Active Hospital Problems    Diagnosis  POA   • **Suicide attempt (CMS/[facility]) [T14.91XA]  Yes   • Bipolar 1 disorder (CMS/[facility]) [F31.9]  Yes   • GERD (gastroesophageal reflux disease) [K21.9]  Yes   • Pharyngeal dysphagia [R13.13]  Yes   • Probable Aspiration Pneumonia (CMS/[facility]) [J69.0]  Yes   • Hypothyroidism (acquired) [E03.9]  Yes   • 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        Brief Hospital Course to date:  Elvia Montero is a 45 y.o. female Jewish with bipolar disorder/OCD admitted 2/21/2021 following suicide attempt (1/2 cup Mr. John, bottle of wine, Klonopin, Seroquel) seen by GI with EGD 2/22/2021 with esophagitis and minimal injury; subsequently with alcohol withdrawal, treated with Zosyn for aspiration pneumonia; she failed FEES 3/20/2021 with severe  pharyngeal dysphagia due to edema/secretions and has been with an NG tube and on tube feeds since that time, also complicated with hyponatremia per nephrology thought to be nephrogenic diabetes insipidus from lithium (post D5, HCTZ, DDAVP) transferred to the PCU 3/4/2021 and subsequently the hospitalist service on 3/7/2021     The following problems new to me today     Assessment/plan was partially entered by my partner and I have reviewed and updated as appropriate on 3/8/21     Severe pharyngeal dysphagia  Esophagitis due to toxic injury  -Failed FEES 3/3/2021  -Continue tube feeds via Keofeed  -SLP following, re-evaluated today and placed on a nectar thick diet with pureed and will pull keofeed   -- SW checking if [facility] will take someone on a modified diet      Suicide attempt  -Ingestion Mr. John, bottle of wine, Klonopin, Seroquel  -Has sitter, suicide precautions  -Agreeable to discharge to inpatient psych facility, has been at the [facility] previously, addiction team following     Thrombocythemia  -Platelets precipitously increasing, improved today, if continues to elevate will consider hematology consultation     S/p alcohol withdrawal  Alcohol abuse with dependence  -Very infrequent need for Valium, decrease dose     S/p nephrogenic diabetes insipidus with hypernatremia  -Felt to be related to chronic lithium use  -S/p DDAVP  -Continue HCTZ, started by nephrology  -Nephrology signed off 3/6/2021     Iron deficiency anemia  -S/p IV iron per nephrology     Hypothyroidism  -Continue Synthroid     Yarsani, refuses blood products per prior documentation       DVT Prophylaxis:  lovenox       Disposition: I expect the patient to be discharged 1-2 days to inpatient psychiatric facility once speech re-evaluates for possible long term enteral nutrition     CODE STATUS:   Code Status and Medical Interventions:   Ordered at: 02/21/21 2046     Code Status:    CPR     Medical Interventions (Level of Support  Prior to Arrest):    Full       Marie Tomlinson, APRN  03/08/21

## 2021-03-08 NOTE — MBS/VFSS/FEES
Acute Care - Speech Language Pathology   Swallow Re-Evaluation  [place]   Fiberoptic Endoscopic Evaluation of Swallowing (FEES)     Patient Name: Elvia Montero  : 1975  MRN: [id number]  Today's Date: 3/8/2021               Admit Date: 2021    *FEES image 3/8/2021: continued arytenoid edema & erythema, however swelling   appeared significantly improved compared to prior FEES exam on 3/3/2021.    Visit Dx:     ICD-10-CM ICD-9-CM   1. Suicide attempt (CMS/[facility])  T14.91XA E958.9   2. Toxic effect of caustic substance, intentional self-harm, initial encounter (CMS/[facility])  T54.92XA 983.9     E950.7   3. Esophagitis  K20.90 530.10   4. Alcoholic intoxication without complication (CMS/[facility])  F10.920 305.00   5. Oropharyngeal dysphagia  R13.12 787.22     Patient Active Problem List   Diagnosis   • Suicide attempt (CMS/[facility])   • Lactic acidosis   • Refusal of blood transfusions as patient is Lutheran   • Elevated ETOH level   • Toxic effect of caustic substance   • Mild Esophagitis w/o injury   • Alcohol withdrawal   • Nephrogenic diabetes insipidus (CMS/[facility])   • Bipolar 1 disorder (CMS/[facility])   • Obsessive compulsive disorder   • GERD (gastroesophageal reflux disease)   • Pharyngeal dysphagia   • Probable Aspiration Pneumonia (CMS/[facility])   • Hypothyroidism (acquired)     Past Medical History:   Diagnosis Date   • Anxiety    • Bipolar 1 disorder (CMS/[facility])    • Depression    • Disease of thyroid gland    • Esophagitis    • Gastroparesis    • GERD (gastroesophageal reflux disease)    • Obsessive compulsive disorder      Past Surgical History:   Procedure Laterality Date   • ENDOSCOPY N/A 2021    Procedure: ESOPHAGOGASTRODUODENOSCOPY;  Surgeon: Chaparro Chester MD;  Location: [facility] ENDOSCOPY;  Service: Gastroenterology;  Laterality: N/A;   • NECK SURGERY          SWALLOW EVALUATION (last 72 hours)      SLP Adult Swallow Evaluation     Row Name 21 1410                   Rehab Evaluation     Document Type  re-evaluation;other (see comments) FEES  -RD        Subjective Information  no complaints  -RD        Patient Observations  alert;cooperative;agree to therapy  -RD        Patient/Family/Caregiver Comments/Observations  Sitter present  -RD        Patient Effort  good  -RD           General Information    Patient Profile Reviewed  yes  -RD        Pertinent History Of Current Problem  Adm w/ alcohol withdrawal, attempted suicide, toxic effect of caustic substance, esophageal inflammation, does not accept blood products 2' Jehovah's wittness. Hospitalist team called about swallowing re-evaluation. Silent aspiration and edema on prior FEES. Repeat FEES evaluation today to see if any improvement.   -RD        Current Method of Nutrition  NPO;nasogastric feedings  -RD        Precautions/Limitations, Vision  WFL;for purposes of eval  -RD        Precautions/Limitations, Hearing  WFL;for purposes of eval  -RD        Prior Level of Function-Communication  unknown  -RD        Prior Level of Function-Swallowing  safe, efficient swallowing in all situations  -RD        Plans/Goals Discussed with  patient;agreed upon  -RD        Barriers to Rehab  medically complex  -RD        Patient's Goals for Discharge  eat/drink without coughing/choking;return to regular diet  -RD           Pain    Additional Documentation  Pain Scale: Numbers Pre/Post-Treatment (Group)  -RD           Pain Scale: Numbers Pre/Post-Treatment    Pretreatment Pain Rating  0/10 - no pain  -RD        Posttreatment Pain Rating  0/10 - no pain  -RD           Fiberoptic Endoscopic Evaluation of Swallowing (FEES)    Risks/Benefits Reviewed  risks/benefits explained;patient;agreed to eval  -RD        Nasal Entry  right:  -RD        Scope serial number/identification  [id number]  -RD           Anatomy and Physiology    Anatomic Considerations  edema;erythema;arytenoids  -RD        Comment  Edma improved. Vocal cords were visible during this exam and appeared WNL.  Arytenoid edema was still impacting swallow.   -RD        Velopharyngeal  WFL  -RD        Base of Tongue  symmetrical;range reduced  -RD        Epiglottis  WFL  -RD        Laryngeal Function Breathing  symmetrical  -RD        Laryngeal Function Phonation  symmetrical  -RD        Laryngeal Function to Breath Hold  TVF contact  -RD        Secretion Rating Scale (Shailesh et al. 1996)  0- normal, no visible secretions  -RD        Secretion Description  thin;clear  -RD        Ice Chips  DNA  -RD        Spontaneous Swallow  frequency reduced  -RD        Sensory  reduced sensation  -RD        Utensils Used  Spoon;Cup;Straw  -RD        Consistencies Trialed  thin liquids;nectar-thick liquids;pudding/puree;regular textures;spoon;cup;straw  -RD           FEES Interpretation    Oral Phase  prolonged manipulation;reduced lingual control;with solids only  -RD        Oral Phase, Comment  Mild oral dysphagia  -RD           Initiation of Pharyngeal Swallow    Initiation of Pharyngeal Swallow  bolus in pyriform sinuses  -RD        Pharyngeal Phase  impaired pharyngeal phase of swallowing  -RD        Penetration Before the Swallow  thin liquids;nectar-thick liquids;secondary to delayed swallow initiation or mistiming;other (see comments) nectar-thick via straw  -RD        Aspiration During the Swallow  thin liquids;nectar-thick liquids;secondary to delayed swallow initiation or mistiming;secondary to reduced vestibular closure;other (see comments) nectar-thick via straw  -RD        Penetration After the Swallow  thin liquids;secondary to residue;in pyriform sinuses  -RD        Response to Penetration  no response  -RD        Response to Aspiration  no response, silent aspiration  -RD        Rosenbek's Scale  thin:;nectar:;8-->Level 8;pudding/puree:;1-->Level 1  -RD        Residue  all consistencies tested;base of tongue;valleculae;pyriform sinuses;secondary to reduced base of tongue retraction;secondary to reduced hyolaryngeal  excursion;other (see comments) mild w/ all; moderate-severe w/ solid; edema impacting  -RD        Response to Residue  with spontaneous subsequent swallow;with cued swallow;other (see comments) reduced; couldn't clear solids  -RD        Attempted Compensatory Maneuvers  bolus size;bolus presentation style;additional subsequent swallow;multiple swallows;throat clear after swallow;combination of maneuvers (see comments)  -RD        Response to Attempted Compensatory Maneuvers  prevented aspiration;reduced residue  -RD        Pharyngeal Phase, Comment  Mild-moderate pharyngeal dysphagia. Penetration before w/ aspiration during the swallow w/ thins & nectar-thick liquid via straw 2' delayed initiation and reduced vestibular closure. Penetration/aspiration after the swallow w/ thins as well 2' pyriform sinus residue (edema of arytenoids contributing to residue). Aspiration was silent, pt could not clear aspiration, even w/ cued cough. Penetration after the swallow w/ solids 2' diffuse residue coating keofeed. Pt having difficulty clearing solids thru pharynx 2' arytenoid edema. No penetration/aspiration w/ small sips of nectar-thick liquids via tsp/cup or pudding consistencies. Mild BOT/vallecular and pyriform sinus residue w/ all consistencies (moderate-severe w/ solids) 2' reduced BOT retraction and decr'd excursion (+ edema impacting). Pt able to reduce residue for liquids/pudding w/ cued multiple swallows and sometimes spontaneous secondary swallow. Extensive education provided to patient on dysphagia, modified diet and goals during treatment. Discussed results w/ APRN on hospitalist team and RN. Will continue pharyngeal strengthening during treatment.  -RD           Clinical Impression    SLP Swallowing Diagnosis  mild;oral dysphagia;mild-moderate;pharyngeal dysphagia  -RD        Functional Impact  risk of aspiration/pneumonia  -RD        Rehab Potential/Prognosis, Swallowing  good, to achieve stated therapy goals   -RD        Swallow Criteria for Skilled Therapeutic Interventions Met  demonstrates skilled criteria  -RD           Recommendations    Therapy Frequency (Swallow)  5 days per week  -RD        Predicted Duration Therapy Intervention (Days)  until discharge  -RD        SLP Diet Recommendation  puree with some mashed;nectar thick liquids;ice chips between meals after oral care, with supervision  -RD        Recommended Diagnostics  FEES;other (see comments) completed 3/8/2021  -RD        Recommended Precautions and Strategies  upright posture during/after eating;small bites of food and sips of liquid;no straw;multiple swallows per bite of food;multiple swallows per sip of liquid;general aspiration precautions  -RD        Oral Care Recommendations  Oral Care BID/PRN  -RD        SLP Rec. for Method of Medication Administration  meds crushed;with pudding or applesauce;as tolerated  -RD        Monitor for Signs of Aspiration  yes;notify SLP if any concerns  -RD        Anticipated Discharge Disposition (SLP)  unknown;anticipate therapy at next level of care  -RD          User Key  (r) = Recorded By, (t) = Taken By, (c) = Cosigned By    Initials Name Effective Dates    Sherry Chávez MS CCC-SLP 04/03/18 -           EDUCATION  The patient has been educated in the following areas:   Dysphagia (Swallowing Impairment) Oral Care/Hydration Modified Diet Instruction.    SLP Recommendation and Plan  SLP Swallowing Diagnosis: mild, oral dysphagia, mild-moderate, pharyngeal dysphagia  SLP Diet Recommendation: puree with some mashed, nectar thick liquids, ice chips between meals after oral care, with supervision  Recommended Precautions and Strategies: upright posture during/after eating, small bites of food and sips of liquid, no straw, multiple swallows per bite of food, multiple swallows per sip of liquid, general aspiration precautions  SLP Rec. for Method of Medication Administration: meds crushed, with pudding or  applesauce, as tolerated     Monitor for Signs of Aspiration: yes, notify SLP if any concerns  Recommended Diagnostics: FEES, other (see comments) (completed 3/8/2021)  Swallow Criteria for Skilled Therapeutic Interventions Met: demonstrates skilled criteria  Anticipated Discharge Disposition (SLP): unknown, anticipate therapy at next level of care  Rehab Potential/Prognosis, Swallowing: good, to achieve stated therapy goals  Therapy Frequency (Swallow): 5 days per week  Predicted Duration Therapy Intervention (Days): until discharge                         Plan of Care Reviewed With: patient  Progress: no change    SLP GOALS     Row Name 03/08/21 1410             Oral Nutrition/Hydration Goal 1 (SLP)    Oral Nutrition/Hydration Goal 1, SLP  LTG: Pt will return to regular diet & thin liquids w/ no overt s/s of aspiration w/ 100% accuracy w/o cues  -RD      Time Frame (Oral Nutrition/Hydration Goal 1, SLP)  by discharge  -RD      Progress/Outcomes (Oral Nutrition/Hydration Goal 1, SLP)  goal revised this date;continuing progress toward goal  -RD         Oral Nutrition/Hydration Goal 2 (SLP)    Oral Nutrition/Hydration Goal 2, SLP  Pt will tolerate nectar-thick liquids and Level 3-pureed w/ some mashed diet w/o s/s of aspiration w/ 100% accuracy w/o cues  -RD      Time Frame (Oral Nutrition/Hydration Goal 2, SLP)  short term goal (STG)  -RD      Barriers (Oral Nutrition/Hydration Goal 2, SLP)  Pt showing inconsistent TC and/or hard immediate cough with ice trials. May be indicating improved pharyngeal sensation.  -RD      Progress/Outcomes (Oral Nutrition/Hydration Goal 2, SLP)  goal revised this date  -RD         Lingual Strengthening Goal 1 (SLP)    Activity (Lingual Strengthening Goal 1, SLP)  increase lingual tone/sensation/control/coordination/movement;increase tongue back strength  -RD      Increase Lingual Tone/Sensation/Control/Coordination/Movement  lingual resistance exercises  -RD      [place]/Accuracy  (Lingual Strengthening Goal 1, SLP)  with minimal cues (75-90% accuracy)  -RD      Time Frame (Lingual Strengthening Goal 1, SLP)  short term goal (STG)  -RD      Progress/Outcomes (Lingual Strengthening Goal 1, SLP)  goal revised this date;continuing progress toward goal  -RD         Pharyngeal Strengthening Exercise Goal 1 (SLP)    Activity (Pharyngeal Strengthening Goal 1, SLP)  increase timing;increase anterior movement of the hyolaryngeal complex;increase closure at entrance to airway/closure of airway at glottis;increase tongue base retraction  -RD      Increase Timing  prepping - 3 second prep or suck swallow or 3-step swallow  -RD      Increase Anterior Movement of the Hyolaryngeal Complex  chin tuck against resistance (CTAR)  -RD      Increase Closure at Entrance to Airway/Closure of Airway at Glottis  supraglottic swallow  -RD      Increase Squeeze/Positive Pressure Generation  effortful pitch glide (falsetto + pharyngeal squeeze)  -RD      Increase Tongue Base Retraction  hard effortful swallow;eron  -RD      [place]/Accuracy (Pharyngeal Strengthening Goal 1, SLP)  with minimal cues (75-90% accuracy)  -RD      Time Frame (Pharyngeal Strengthening Goal 1, SLP)  short term goal (STG)  -RD      Progress/Outcomes (Pharyngeal Strengthening Goal 1, SLP)  goal revised this date;continuing progress toward goal  -RD         Swallow Compensatory Strategies Goal 1 (SLP)    Activity (Swallow Compensatory Strategies/Techniques Goal 1, SLP)  compensatory strategies;postural techniques;small bites;small cup sips;extra swallow per bolus  -RD      [place]/Accuracy (Swallow Compensatory Strategies/Techniques Goal 1, SLP)  independently (over 90% accuracy)  -RD      Time Frame (Swallow Compensatory Strategies/Techniques Goal 1, SLP)  short term goal (STG)  -RD        User Key  (r) = Recorded By, (t) = Taken By, (c) = Cosigned By    Initials Name Provider Type    Sherry Chávez MS CCC-SLP Speech and  Language Pathologist             Time Calculation:   Time Calculation- SLP     Row Name 03/08/21 1634             Time Calculation- SLP    SLP Start Time  1410  -RD      SLP Received On  03/08/21  -RD        User Key  (r) = Recorded By, (t) = Taken By, (c) = Cosigned By    Initials Name Provider Type    Sherry Chávez MS CCC-SLP Speech and Language Pathologist          Therapy Charges for Today     Code Description Service Date Service Provider Modifiers Qty    [id number] HC ST FIBEROPTIC ENDO EVAL SWALL 6 3/8/2021 Sherry Wooten MS CCC-SLP GN 1             Patient was not wearing a face mask and did exhibit coughing during this therapy encounter.  Procedure performed was aerosolizing, involved close contact (within 6 feet for at least 15 minutes or longer), and did not involve contact with infectious secretions or specimens.  Therapist used appropriate personal protective equipment including gloves, standard procedure mask, eye protection, gown and N95 mask.  Appropriate PPE was worn during the entire therapy session.  Hand hygiene was completed before and after therapy session.  Isela Castellano, MICHAEL student was also present during this encounter and the aforementioned applies to them, as well.   Sherry Wooten MS CCC-SLP  3/8/2021

## 2021-03-08 NOTE — PROGRESS NOTES
"Continued Stay Note  [facility]     Patient Name: Elvia Montero  MRN: [id number]  Today's Date: 3/8/2021    Admit Date: 2/21/2021    Discharge Plan     Row Name 03/08/21 1221       Plan    Plan  [facility] assessment and AUD cessation treatment resources    Plan Comments  Met with pleasant woman today- she is forthcoming and open to discuss her psychiatric HX.. She has been to [facility] in the past, and is agreeable to this option.  She states that she began increasing her ETOH intake over the past year- she states that she recently went through a divorce, and began drinking wine- she eventually increased her wine consumption to a bottle of wine daily- she realizes that this was interacting with her psych meds, she also states that she feels this \"mixture\" led to her ingesting the MrConnie John.  She is aware she needs psychiatric evaluation and care.  She is agreeable to a [facility] assessment- she also wishes to get hep for her ETOH there as well this will be set up by .  We provided her with a packet of resources for AUD treatment, psych facilities, and outreach.  Provided with support meeting resources and community outreach services with [facility] and she has also been signed up with [facility].  I provided her with a website for additional support meetings.  Thank you very much for this consult.        Discharge Codes    No documentation.       Expected Discharge Date and Time     Expected Discharge Date Expected Discharge Time    Mar 6, 2021             Janis Pittman RN ,BSN   Addiction Coordinator     "

## 2021-03-08 NOTE — PROGRESS NOTES
Continued Stay Note  [facility]     Patient Name: Elvia Montero  MRN: [id number]  Today's Date: 3/8/2021    Admit Date: 2/21/2021    Discharge Plan     Row Name 03/08/21 3005       Plan    Plan  Social work met with the patient for a discharge planning update. The patient will have a [facility] at  discharge and  said she would like some clothes and she will be given this at discharge.    Row Name 03/08/21 1229       Plan    Plan  [facility] assessment and AUD cessation treatment resources    Plan Comments  Met with pleasant woman today- she is forthcoming and open to discuss her psychiatric HX.. She has been to [facility] in the past, and is agreeable to this option.        Discharge Codes    No documentation.       Expected Discharge Date and Time     Expected Discharge Date Expected Discharge Time    Mar 6, 2021             ZARIA Morales

## 2021-03-08 NOTE — DISCHARGE INSTR - DIET
FEES 3/8/2021  Reason for Referral  Patient was referred for a FEES to assess the efficiency of his/her swallow function, rule out aspiration and make recommendations regarding safe dietary consistencies, effective compensatory strategies, and safe eating environment.         Recommendations/Treatment  SLP Swallowing Diagnosis: mild, oral dysphagia, mild-moderate, pharyngeal dysphagia  Functional Impact: risk of aspiration/pneumonia  Rehab Potential/Prognosis, Swallowing: good, to achieve stated therapy goals  Swallow Criteria for Skilled Therapeutic Interventions Met: demonstrates skilled criteria  Therapy Frequency (Swallow): 5 days per week  Predicted Duration Therapy Intervention (Days): until discharge  SLP Diet Recommendation: puree with some mashed, nectar thick liquids, ice chips between meals after oral care, with supervision  Recommended Diagnostics: FEES, other (see comments) (completed 3/8/2021)  Recommended Precautions and Strategies: upright posture during/after eating, small bites of food and sips of liquid, no straw, multiple swallows per bite of food, multiple swallows per sip of liquid, general aspiration precautions  SLP Rec. for Method of Medication Administration: meds crushed, with pudding or applesauce, as tolerated  Monitor for Signs of Aspiration: yes, notify SLP if any concerns  Anticipated Discharge Disposition (SLP): unknown, anticipate therapy at next level of care    Instrumental Set-up  Risks/Benefits Reviewed: risks/benefits explained, patient, agreed to eval  Nasal Entry: right:  Anatomic Considerations: edema, erythema, arytenoids  Comment: Edma improved. Vocal cords were visible during this exam and appeared WNL. Arytenoid edema was still impacting swallow.   Utensils Used: Spoon, Cup, Straw  Consistencies Trialed: thin liquids, nectar-thick liquids, pudding/puree, regular textures, spoon, cup, straw    Oral Preparation/ Oral Phase  Oral Phase: prolonged manipulation, reduced  lingual control, with solids only  Oral Phase, Comment: Mild oral dysphagia    Pharyngeal Phase  Initiation of Pharyngeal Swallow: bolus in pyriform sinuses  Pharyngeal Phase: impaired pharyngeal phase of swallowing  Penetration Before the Swallow: thin liquids, nectar-thick liquids, secondary to delayed swallow initiation or mistiming, other (see comments) (nectar-thick via straw)  Aspiration During the Swallow: thin liquids, nectar-thick liquids, secondary to delayed swallow initiation or mistiming, secondary to reduced vestibular closure, other (see comments) (nectar-thick via straw)  Penetration After the Swallow: thin liquids, secondary to residue, in pyriform sinuses  Response to Aspiration: no response, silent aspiration  Rosenbek's Scale: thin:, nectar:, 8-->Level 8, pudding/puree:, 1-->Level 1  Residue: all consistencies tested, base of tongue, valleculae, pyriform sinuses, secondary to reduced base of tongue retraction, secondary to reduced hyolaryngeal excursion, other (see comments) (mild w/ all; moderate-severe w/ solid; edema impacting)  Response to Residue: with spontaneous subsequent swallow, with cued swallow, other (see comments) (reduced; couldn't clear solids)  Attempted Compensatory Maneuvers: bolus size, bolus presentation style, additional subsequent swallow, multiple swallows, throat clear after swallow, combination of maneuvers (see comments)  Response to Attempted Compensatory Maneuvers: prevented aspiration, reduced residue  Pharyngeal Phase, Comment: Mild-moderate pharyngeal dysphagia. Penetration before w/ aspiration during the swallow w/ thins & nectar-thick liquid via straw 2' delayed initiation and reduced vestibular closure. Penetration/aspiration after the swallow w/ thins as well 2' pyriform sinus residue (edema of arytenoids contributing to residue). Aspiration was silent, pt could not clear aspiration, even w/ cued cough. Penetration after the swallow w/ solids 2' diffuse residue  coating keofeed. Pt having difficulty clearing solids thru pharynx 2' arytenoid edema. No penetration/aspiration w/ small sips of nectar-thick liquids via tsp/cup or pudding consistencies. Mild BOT/vallecular and pyriform sinus residue w/ all consistencies (moderate-severe w/ solids) 2' reduced BOT retraction and decr'd excursion (+ edema impacting). Pt able to reduce residue for liquids/pudding w/ cued multiple swallows and sometimes spontaneous secondary swallow. Extensive education provided to patient on dysphagia, modified diet and goals during treatment. Discussed results w/ APRN on hospitalist team and RN. Will continue pharyngeal strengthening during treatment.           MBS/VFSS Reason for Referral 3/11/2021  Patient was referred for a MBS to assess the efficiency of his/her swallow function, rule out aspiration and make recommendations regarding safe dietary consistencies, effective compensatory strategies, and safe eating environment.             Recommendations/Treatment  SLP Swallowing Diagnosis: functional oral phase, functional pharyngeal phase  Swallow Criteria for Skilled Therapeutic Interventions Met: no problems identified which require skilled intervention  SLP Diet Recommendation: regular textures, thin liquids  Recommended Precautions and Strategies: upright posture during/after eating, general aspiration precautions  SLP Rec. for Method of Medication Administration: meds whole, with thin liquids, with pudding or applesauce, as tolerated    Instrumental Set-up  Utensils Used: spoon, cup, straw  Consistencies Trialed: thin liquids, pudding thick, regular textures    Oral Preparation/ Oral Phase  Oral Prep Phase: WFL  Oral Transit Phase: WFL  Oral Residue: WFL             Pharyngeal Phase  Initiation of Pharyngeal Swallow: WFL  Pharyngeal Phase: functional pharyngeal phase of swallowing  Penetration Before the Swallow: thin liquids, other (see comments) (x1 w/ initial tsp only)  Penetration During  the Swallow: thin liquids, other (see comments) (w/ consecutive drinks only)  Response to Penetration: shallow, transient, other (see comments) (trace amount)  Pharyngeal Residue: other (see comments) (no significant pharyngeal residue)  Pharyngeal Phase, Comment: Pt presented w/ safe and grossly functional oropharyngeal swallow. Timing was adequate. There was intermittent penetration w/ thin liquid; however, was consistently minimal/trace amount and easily expelled laryngeal vestibule w/ completion of the swallow and no evidence of aspiration. No penetration/aspiration w/ pudding or solid.    Cervical Esophageal Phase

## 2021-03-08 NOTE — PROGRESS NOTES
Continued Stay Note  [facility]     Patient Name: Elvia Montero  MRN: [id number]  Today's Date: 3/8/2021    Admit Date: 2/21/2021    Discharge Plan     Row Name 03/08/21 7319       Plan    Plan  Social work called the [facility] and spoke with Jose Carlos in the assessment office and he said they will not accept a patient on a modified diet at this time. The patient would like to go to [facility] because her Psychiatrist is at the [facility]. Kerry [phone]    Row Name 03/08/21 1549       Plan    Plan  Social work met with the patient for a discharge planning update. The patient will have a [facility] Evaluation at  discharge and  said she would like some clothes and she will be given this at discharge.        Discharge Codes    No documentation.       Expected Discharge Date and Time     Expected Discharge Date Expected Discharge Time    Mar 6, 2021             ZARIA Morales

## 2021-03-08 NOTE — PROGRESS NOTES
Clinical Nutrition     Nutrition Support Assessment  Reason for Visit: EN f/u      Patient Name: Elvia Montero  YOB: 1975  MRN: [id number]  Date of Encounter: 03/08/21 12:34 EST  Admission date: 2/21/2021       - Recommend to consider placement of PEG tube if unable to pass swallow eval.     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   PSxH: She  has a past surgical history that includes Neck surgery and Esophagogastroduodenoscopy (N/A, 2/22/2021).     Substance abuse: Etoh    Reported/Observed/Food/Nutrition Related History:     Tube feed running at goal rate. Possible speech re-eval today.  notes possible discharge Tuesday. Patient may need PEG tube placement if unable to pass swallow eval. Continues to have ND tube in place.     Anthropometrics     Height: 67in  Weight:194lb per bed wt on 3/5  BMI: 30.4  IBW:135lb      Labs reviewed     Results from last 7 days   Lab Units 03/08/21  0659 03/07/21  0855 03/06/21  0758   GLUCOSE mg/dL 170* 206* 202*   BUN mg/dL 15 16 19   CREATININE mg/dL 0.58 0.58 0.56*   SODIUM mmol/L 138 139 137   CHLORIDE mmol/L 102 103 101   POTASSIUM mmol/L 3.6 4.6 4.1   PHOSPHORUS mg/dL 3.6 3.3 3.4   MAGNESIUM mg/dL 2.1 2.2 2.2   ALT (SGPT) U/L 52* 45*  --      Results from last 7 days   Lab Units 03/08/21  0659 03/07/21  0855 03/06/21  0758   ALBUMIN g/dL 3.70 4.30 3.90   CRP mg/dL 1.97*  --   --    TRIGLYCERIDES mg/dL 192*  --   --         Results from last 7 days   Lab Units 03/08/21  1215 03/08/21  0554 03/08/21  0058 03/07/21  1813 03/07/21  1204 03/07/21  0553   GLUCOSE mg/dL 196* 194* 201* 175* 188* 187*         Medications reviewed-yes   Hydrochlorothiazide, MVI, KLOR-CON, senokot, Thiamin     Current Nutrition Prescription     PO: NPO Diet     EN: Peptamen AF @ 65ml/h x 22h/d (1430ml/d) with 25ml/h fw x22hd. Rx will provide 1716cal (provides 28 huy/kg IBW and 19 huy/kg actual body wt)/109g pro (provides 1.8g/kg IBW and 1.2g/kg actual body wt)/1158ml TF fw and 1716ml total fw/d.     Pt received avg of 1982ml TF during the past day (138% of goal volume).       Nutrition Diagnosis     2-23-21, 3-3  Problem Inadequate energy intake   Etiology Per Clinical Status   Signs/Symptoms 81% goal volume EN     Status:resolved 3/5.     3/5, 3/8:  Problem--difficulty swallowing/needs alternate route  Etiology-dysphagia per SLP eval  S/S: NPO    Nutrition Intervention/ Rx:     Follow treatment progress, Nutrition support,  Continue TF    - Peptamen AF @ 65ml/h x 22h/d (1430ml/d) with 25ml/h fw x22hd. Rx will provide 1716cal (provides 28 huy/kg IBW and 19 huy/kg actual body wt)/109g pro (provides 1.8g/kg IBW and 1.2g/kg actual body wt)/1158ml TF fw and 1716ml total fw/d.   TF Rx meets 100% estimated needs.     - Recommend to consider placement of PEG tube if unable to pass swallow eval.       Goal:   General: Nutrition support treatment    Adjust EN    Monitoring/Evaluation:   Per protocol    Althea Fleming RD  Time Spent: 30min

## 2021-03-08 NOTE — PAYOR COMM NOTE
"Ref # [id number]  Pauline Moore RN, BSN  Phone # [phone]  Fax # [phone]  Elvia Montero (45 y.o. Female)     Date of Birth Social Security Number Address Home Phone MRN    1975  [id number]  [facility] [id number] [phone] [id number]    Rastafarian Marital Status          Taoism        Admission Date Admission Type Admitting Provider Attending Provider Department, Room/Bed    2/21/21 Emergency Fred Langley, Fred Baum,  [facility] 6B, N625/1    Discharge Date Discharge Disposition Discharge Destination                       Attending Provider: Fred Langley DO    Allergies: No Known Allergies    Isolation: None   Infection: None   Code Status: CPR    Ht: 170.2 cm (67\")   Wt: 92.1 kg (203 lb 1.6 oz)    Admission Cmt: None   Principal Problem: Suicide attempt (CMS/[facility]) [T14.91XA]                 Active Insurance as of 2/21/2021     Primary Coverage     Payor Plan Insurance Group Employer/Plan Group    [place] [id number]     Payor Plan Address Payor Plan Phone Number Payor Plan Fax Number Effective Dates    [employer] [address] [address] [phone]  1/1/2021 - None Entered    [facility] [id number]       Subscriber Name Subscriber Birth Date Member ID       ELVIA MONTERO 1975 [id number]                    History & Physical      Eyad Obrien MD at 02/21/21 2252          Pulmonary/Critical Care History and Physical Exam     LOS: 0 days   Patient Care Team:  Provider, No Known as PCP - General  Provider, No Known as PCP - Family Medicine    Chief Complaint:    Chief Complaint   Patient presents with   • Suicidal       Subjective     HPI: Elvia Montero is a 45 y.o. female who has a PMHX of anxiety/depression who attempted suicide on 2/20 by ingesting a partial bottle of Mr. Clean  & a bottle of wine.  She thinks she was only able to drink about a half a cup of the Mr. Clean.  She states " this was due to being depressed over her divorce.  She has been having nausea & vomiting since then w/ abdominal/esophageal/throat pain.  She has been unable to maintain PO intake.  She sought medical treatment on 2/21.  She denies to me any suicidal ideation currently.      Poison control was contacted by ED staff who recommended supportive care.  Gastroenterology was contacted by the emergency department and felt that the patient did not need an immediate endoscopic procedure.    History taken from: patient    Past Medical History:   Diagnosis Date   • Anxiety    • Depression    • Obsessive compulsive disorder 2020       Past Surgical History:   Procedure Laterality Date   • NECK SURGERY         History reviewed. No pertinent family history.    Social History     Socioeconomic History   • Marital status:      Spouse name: Not on file   • Number of children: Not on file   • Years of education: Not on file   • Highest education level: Not on file   Tobacco Use   • Smoking status: Never Smoker   Substance and Sexual Activity   • Alcohol use: Yes     Alcohol/week: 7.0 standard drinks     Types: 7 Glasses of wine per week   • Drug use: Never       No Known Allergies    PMH/FH/SocH were reviewed by me and updates were made.     Review of Systems:    All systems were reviewed and negative except as noted in subjective.  Gastrointestinal: positive for  difficulty / pain with swallowing, nausea, pain and vomiting  Behavioral/Psych: positive for  suicidal ideations and See HPI    Objective     Vital Signs  Temp:  [98.4 °F (36.9 °C)-99.5 °F (37.5 °C)] 98.4 °F (36.9 °C)  Heart Rate:  [101-117] 105  Resp:  [20] 20  BP: (120-177)/() 159/100    Physical Exam:     General Appearance:    Alert, cooperative, in no acute distress   Head:    Normocephalic, without obvious abnormality, atraumatic   Eyes:            Lids and lashes normal, conjunctivae and sclerae normal,   no icterus, no pallor, corneas clear, PER   ENMT:    Ears appear intact with no abnormalities noted      No oral lesions, no thrush, oral mucosa erythematous.  She has some dry sloughing skin on her lips with some cracking and bleeding.   Neck:   No adenopathy, supple, trachea midline, no thyromegaly, no carotid bruit, no JVD   Lungs/resp:     Normal effort, symmetric chest rise, no crepitus, clear to      auscultation bilaterally, no chest wall tenderness.                  Heart/CV:    Regular rhythm and normal rate, normal S1 and S2, no         murmur   Abdomen/GI:     Normal bowel sounds, no masses, no organomegaly, soft,     nontender, nondistended, no guarding, no rebound                tenderness   G/U:     Deferred   Extremities/MSK:   No clubbing, cyanosis or edema.  No deformities.    Pulses:   Pulses palpable and equal bilaterally   Skin:   No bleeding, bruising or rash   Hem/Lymph:   No cervical or supraclavicular adenopathy.    Neurologic:   Moves all extremities with no obvious focal motor deficit.  Cranial nerves 2 - 12 grossly intact            Psychiatric: Flat affect, oriented x 3.      Results Review:     I reviewed the patient's new clinical results.   Results from last 7 days   Lab Units 02/21/21 1941   SODIUM mmol/L 136   POTASSIUM mmol/L 3.6   CHLORIDE mmol/L 96*   CO2 mmol/L 25.0   BUN mg/dL 6   CREATININE mg/dL 0.75   CALCIUM mg/dL 9.3   BILIRUBIN mg/dL 0.3   ALK PHOS U/L 142*   ALT (SGPT) U/L 42*   AST (SGOT) U/L 53*   GLUCOSE mg/dL 150*     Results from last 7 days   Lab Units 02/21/21 1941   WBC 10*3/mm3 14.63*   HEMOGLOBIN g/dL 11.1*   HEMATOCRIT % 36.5   PLATELETS 10*3/mm3 352           I reviewed the patient's new imaging including images and reports.    CT chest 2/21/2021:  IMPRESSION:  Fluid level is seen in the esophagus which is dilated in the mid chest. There is air that has layered anteriorly. This is thought to be likely contained within the submucosa of the esophagus. However, this may be further evaluated with  water-soluble  contrast and repeat chest CT scan or endoscopy if clinically indicated.    Medication Review:   [START ON 2/22/2021] enoxaparin, 40 mg, Subcutaneous, Q24H  [START ON 2/22/2021] pantoprazole, 40 mg, Intravenous, Q AM  sodium chloride, 10 mL, Intravenous, Q12H      [START ON 2/22/2021] HYDROmorphone HCl-NaCl,   lactated ringers, 125 mL/hr, Last Rate: 125 mL/hr (02/21/21 5443)        Assessment/Plan       Suicide attempt (CMS/[facility])    Lactic acidosis    Refusal of blood transfusions as patient is [place]    Elevated ETOH level    45 y.o. female admitted after a suicide attempt.  She attempted suicide by drinking a partial bottle of Mr. Clean.  She currently reports abdominal pain and throat pain.  Ingestion was approximately 24 hours prior to her presentation.  She also has ingested alcohol.  She reports that she typically drinks about 2 glasses of wine per day.  She is hemodynamically stable currently other than some mild tachycardia.    Plan:    - ICU  - Monitor respiratory status closely with low threshold for intubation/mechanical ventilation if there is any sign of worsening.  - Consult gastroenterology for consideration of endoscopy to assess severity of esophageal injury.  - IV hydration  - NPO  - F/U reflex lactic acid  - [place] evaluation  - supportive care  - limit lab draws/use pediatric tubes as patient is a Temple  - Consult GI in am for ? EGD  - Monitor for evidence of alcohol withdrawal.    Patient is critically ill secondary to tenuous respiratory and gastrointestinal status post ingestion of caustic liquid with risk of respiratory failure, GI perforation, sepsis, and death.  As such, she requires continuous monitoring and frequent reassessment for consideration of adjustment in management.  I personally reassessed her multiple times.    Critical care time : 38 minutes spent by me personally.(This excludes time spent performing separately reportable procedures  "and services). including high complexity decision making to assess, manipulate, and support vital organ system failure in this individual who has impairment of one or more vital organ systems such that there is a high probability of imminent or life threatening deterioration in the patient’s condition.    Electronically signed by:  Eyad Obrien MD  02/21/21  23:53 EST            Electronically signed by Eyad Obrien MD at 02/22/21 0016          Emergency Department Notes      Vasiliy Frazier MD at 02/22/21 0157          Subjective   Pt presents with complaints after a suicide attempt.  Yesterday she decided to attempt suicide by drinking alcohol, overdosing on Seroquel and Klonopin, and drinking Mr. Clean solution.  She denies ingestion of aspirin or Tylenol.  She did not die and today she presents for care.  She has been drinking alcohol today as well - \"a glass of wine.\"  She complains of sore throat, chest discomfort, difficulty swallowing, hoarse voice.  She does not have any dyspnea or abdominal pain.      History provided by:  Patient      Review of Systems   Constitutional: Negative for fever.   HENT: Positive for sore throat, trouble swallowing and voice change.    Respiratory: Negative for cough and shortness of breath.    Cardiovascular: Positive for chest pain.   Gastrointestinal: Negative for abdominal pain, diarrhea and vomiting.   Psychiatric/Behavioral: Positive for self-injury and suicidal ideas.   All other systems reviewed and are negative.      Past Medical History:   Diagnosis Date   • Anxiety    • Depression    • Obsessive compulsive disorder 2020       No Known Allergies    Past Surgical History:   Procedure Laterality Date   • NECK SURGERY         History reviewed. No pertinent family history.    Social History     Socioeconomic History   • Marital status:      Spouse name: Not on file   • Number of children: Not on file   • Years of education: Not on file   • Highest " education level: Not on file   Tobacco Use   • Smoking status: Never Smoker   Substance and Sexual Activity   • Alcohol use: Yes     Alcohol/week: 7.0 standard drinks     Types: 7 Glasses of wine per week   • Drug use: Never           Objective   Physical Exam  Vitals signs and nursing note reviewed.   Constitutional:       General: She is not in acute distress.     Appearance: Normal appearance. She is not ill-appearing.   HENT:      Head: Normocephalic and atraumatic.      Mouth/Throat:      Mouth: Mucous membranes are moist.      Comments: Her tongue and pharynx are beefy red, edematous and coarse appearing.  Uvula is midline.  Her voice is easily understandable and doesn't seem hoarse to me but I don't know her baseline phonation.  Eyes:      General: No scleral icterus.        Right eye: No discharge.         Left eye: No discharge.      Conjunctiva/sclera: Conjunctivae normal.   Neck:      Musculoskeletal: Normal range of motion and neck supple.   Cardiovascular:      Rate and Rhythm: Regular rhythm. Tachycardia present.      Heart sounds: No murmur.   Pulmonary:      Effort: Pulmonary effort is normal. No respiratory distress.      Breath sounds: Normal breath sounds. No wheezing.   Abdominal:      General: Bowel sounds are normal. There is no distension.      Palpations: Abdomen is soft.      Tenderness: There is no abdominal tenderness. There is no guarding or rebound.   Musculoskeletal: Normal range of motion.         General: No swelling.   Skin:     General: Skin is warm and dry.      Findings: No rash.   Neurological:      General: No focal deficit present.      Mental Status: She is alert and oriented to person, place, and time. Mental status is at baseline.   Psychiatric:         Mood and Affect: Mood normal.         Behavior: Behavior normal.         Thought Content: Thought content normal.         Procedures          ED Course         Caustic esophagitis and pharyngitis.  Mr. John makes many  products but most seem to be a dilute sodium hydroxide mixed with surfactants.  The concern would be liquefactive necrosis but at lower concentrations this is less common.  I consulted Dr Bustillo and he recommended against early EGD, citing risk of perforation.  He recommended eval for possible perforation and GI will assess tomorrow.    Labs show alcohol toxicity and lactic acidosis and otherwise are benign.  CT chest shows air fluid levels in the esophagus without clear perforation.  Esophagram is not available at this time on a Sunday evening.    Serial rechecks. She will require admission for evaluation and stabilization of her alkali ingestion before she can be cleared for psychiatric evaluation.                                  MDM  Number of Diagnoses or Management Options  Alcoholic intoxication without complication (CMS/HCC):   Esophagitis:   Suicide attempt (CMS/[facility]):   Toxic effect of caustic substance, intentional self-harm, initial encounter (CMS/[facility]):      Amount and/or Complexity of Data Reviewed  Clinical lab tests: reviewed and ordered  Tests in the radiology section of CPT®: reviewed and ordered  Discuss the patient with other providers: yes  Independent visualization of images, tracings, or specimens: yes        Final diagnoses:   Suicide attempt (CMS/HCC)   Toxic effect of caustic substance, intentional self-harm, initial encounter (CMS/HCC)   Esophagitis   Alcoholic intoxication without complication (CMS/HCC)            Vasiliy Frazier MD  02/22/21 0205      Electronically signed by Vasiliy Frazier MD at 02/22/21 0205         Current Facility-Administered Medications   Medication Dose Route Frequency Provider Last Rate Last Admin   • acetaminophen (TYLENOL) 160 MG/5ML solution 650 mg  650 mg Oral Q6H PRN Ramon Shankar MD   649.6 mg at 03/08/21 1051   • carvedilol (COREG) tablet 6.25 mg  6.25 mg Nasogastric BID With Meals Koby Mcmahon MD   6.25 mg at 03/08/21  1051   • diazePAM (VALIUM) tablet 5 mg  5 mg Nasogastric Q6H PRN Fred Langley DO   5 mg at 03/08/21 0610   • enoxaparin (LOVENOX) syringe 40 mg  40 mg Subcutaneous Q24H Ramon Shankar MD   40 mg at 03/08/21 1051   • folic acid (FOLVITE) tablet 1 mg  1 mg Nasogastric Daily Ramon Shankar MD   1 mg at 03/08/21 1053   • hydroCHLOROthiazide (HYDRODIURIL) oral 12.5 mg  12.5 mg Nasogastric Daily Ramon Shankar MD   12.5 mg at 03/08/21 1051   • influenza vac split quad (FLUZONE,FLUARIX,AFLURIA,FLULAVAL) injection 0.5 mL  0.5 mL Intramuscular During Hospitalization Ramon Shankar MD       • insulin regular (humuLIN R,novoLIN R) injection 0-7 Units  0-7 Units Subcutaneous Q6H Ramon Shankar MD   2 Units at 03/08/21 0609   • lansoprazole (FIRST) oral suspension 30 mg  30 mg Nasogastric BID AC Ramon Shankar MD   30 mg at 03/08/21 1053   • levothyroxine (SYNTHROID, LEVOTHROID) tablet 88 mcg  88 mcg Nasogastric Q AM Ramon Shankar MD   88 mcg at 03/08/21 0609   • magnesium sulfate 4g/100mL (PREMIX) infusion  4 g Intravenous PRN Davion Barnett MD       • melatonin tablet 10 mg  10 mg Nasogastric Nightly Ramon Shankar MD   10 mg at 03/07/21 2209   • multivitamin chewable tablet 1 tablet  1 tablet Nasogastric Daily Ramon Shankar MD   1 tablet at 03/08/21 1050   • ondansetron (ZOFRAN) injection 4 mg  4 mg Intravenous Q6H PRN Ramon Shankar MD   4 mg at 03/05/21 1001   • potassium chloride (KLOR-CON) packet 30 mEq  30 mEq Nasogastric Daily Davion Barnett MD   30 mEq at 03/08/21 1052   • potassium chloride (KLOR-CON) packet 40 mEq  40 mEq Nasogastric PRN Ramon Shankar MD   40 mEq at 03/05/21 1533    Or   • potassium chloride 10 mEq in 100 mL IVPB  10 mEq Intravenous Q1H PRN Ramon Shankar MD       • sennosides (SENOKOT) 8.8 MG/5ML syrup 10 mL  10 mL Nasogastric BID Ramon Shankar MD   10 mL  at 03/08/21 1051   • sodium chloride 0.9 % flush 10 mL  10 mL Intravenous Q12H Ramon Shankar MD   10 mL at 03/08/21 1052   • sodium chloride 0.9 % flush 10 mL  10 mL Intravenous PRN Ramon Shankar MD   10 mL at 03/07/21 0810   • thiamine (VITAMIN B-1) tablet 100 mg  100 mg Nasogastric Daily Ramon Shankar MD   100 mg at 03/08/21 1054       Lab Results (last 24 hours)     Procedure Component Value Units Date/Time    Comprehensive Metabolic Panel [[id number]]  (Abnormal) Collected: 03/08/21 0659    Specimen: Blood Updated: 03/08/21 0817     Glucose 170 mg/dL      BUN 15 mg/dL      Creatinine 0.58 mg/dL      Sodium 138 mmol/L      Potassium 3.6 mmol/L      Chloride 102 mmol/L      CO2 22.0 mmol/L      Calcium 9.5 mg/dL      Total Protein 7.3 g/dL      Albumin 3.70 g/dL      ALT (SGPT) 52 U/L      AST (SGOT) 31 U/L      Alkaline Phosphatase 89 U/L      Total Bilirubin <0.2 mg/dL      eGFR Non African Amer 111 mL/min/1.73      Globulin 3.6 gm/dL      A/G Ratio 1.0 g/dL      BUN/Creatinine Ratio 25.9     Anion Gap 14.0 mmol/L     Narrative:      GFR Normal >60  Chronic Kidney Disease <60  Kidney Failure <15      C-reactive Protein [[id number]]  (Abnormal) Collected: 03/08/21 0659    Specimen: Blood Updated: 03/08/21 0817     C-Reactive Protein 1.97 mg/dL     Triglycerides [[id number]]  (Abnormal) Collected: 03/08/21 0659    Specimen: Blood Updated: 03/08/21 0817     Triglycerides 192 mg/dL      Comment: Falsely depressed results may occur on samples drawn from patients receiving N-Acetylcysteine (NAC) or Metamizole.       Narrative:      Triglyceride Reference Ranges:    Normal           less than 150 mg/dL  Borderline       150-199 mg/dL  High             200-499 mg/dL  Very High        greater than 499 mg/dL    Phosphorus [[id number]]  (Normal) Collected: 03/08/21 0659    Specimen: Blood Updated: 03/08/21 0817     Phosphorus 3.6 mg/dL     Magnesium [[id number]]  (Normal) Collected: 03/08/21  0659    Specimen: Blood Updated: 03/08/21 0817     Magnesium 2.1 mg/dL     CBC & Differential [[id number]]  (Abnormal) Collected: 03/08/21 0659    Specimen: Blood Updated: 03/08/21 0758    Narrative:      The following orders were created for panel order CBC & Differential.  Procedure                               Abnormality         Status                     ---------                               -----------         ------                     Scan Slide[[id number]]                                                                  CBC Auto Differential[[id number]]        Abnormal            Final result                 Please view results for these tests on the individual orders.    CBC Auto Differential [[id number]]  (Abnormal) Collected: 03/08/21 0659    Specimen: Blood Updated: 03/08/21 0758     WBC 8.08 10*3/mm3      RBC 4.20 10*6/mm3      Hemoglobin 10.3 g/dL      Hematocrit 37.0 %      MCV 88.1 fL      MCH 24.5 pg      MCHC 27.8 g/dL      RDW 23.4 %      RDW-SD 72.9 fl      MPV 10.1 fL      Platelets 778 10*3/mm3      Neutrophil % 70.1 %      Lymphocyte % 20.0 %      Monocyte % 6.4 %      Eosinophil % 1.2 %      Basophil % 0.9 %      Immature Grans % 1.4 %      Neutrophils, Absolute 5.66 10*3/mm3      Lymphocytes, Absolute 1.62 10*3/mm3      Monocytes, Absolute 0.52 10*3/mm3      Eosinophils, Absolute 0.10 10*3/mm3      Basophils, Absolute 0.07 10*3/mm3      Immature Grans, Absolute 0.11 10*3/mm3      nRBC 0.0 /100 WBC     Prealbumin [[id number]] Collected: 03/08/21 0659    Specimen: Blood Updated: 03/08/21 0738    POC Glucose Once [[id number]]  (Abnormal) Collected: 03/08/21 0554    Specimen: Blood Updated: 03/08/21 0556     Glucose 194 mg/dL     POC Glucose Once [[id number]]  (Abnormal) Collected: 03/08/21 0058    Specimen: Blood Updated: 03/08/21 0059     Glucose 201 mg/dL     POC Glucose Once [[id number]]  (Abnormal) Collected: 03/07/21 1813    Specimen: Blood Updated: 03/07/21 1814     Glucose 175 mg/dL             Physician Progress Notes (last 48 hours) (Notes from 21 1140 through 21 1140)      Fred Langley, DO at 21 0714              [facility] Medicine Services  PROGRESS NOTE    Patient Name: Elvia Montero  : 1975  MRN: [id number]    Date of Admission: 2021  Primary Care Physician: Provider, No Known    Subjective   Subjective     CC:  Follow-up ICU stepdown, suicide attempt    HPI:  Throat feels less sore, denies breathing difficulty.  Tolerating tube feeds.  Is hopeful that she will pass her swallow evaluation tomorrow.  Denies other acute complaints.  Reiterates plan to transition to the [place]    ROS:  Gen- No fevers, chills  CV- No chest pain, palpitations  Resp- No cough, dyspnea  GI- No N/V/D, abd pain    Objective   Objective     Vital Signs:   Temp:  [96.5 °F (35.8 °C)-97.6 °F (36.4 °C)] 97.6 °F (36.4 °C)  Heart Rate:  [71-87] 82  Resp:  [16-20] 16  BP: (120-177)/() 160/94        Physical Exam:  Constitutional: No acute distress, awake, alert, sitting up in bed  HENT: NCAT, mucous membranes moist, NG tube in nare  Respiratory: Clear to auscultation bilaterally, respiratory effort normal   Cardiovascular: RRR, no murmurs, rubs, or gallops, palpable radial pulse bilaterally  Gastrointestinal: Positive bowel sounds, soft, nontender, nondistended  Musculoskeletal: No bilateral ankle edema  Psychiatric: Appropriate affect, cooperative  Neurologic: Oriented x 3, strength symmetric in all extremities, speech clear    Results Reviewed:  Results from last 7 days   Lab Units 21  0602 21  0423 21  0331   WBC 10*3/mm3 8.05 14.49* 18.38*   HEMOGLOBIN g/dL 10.0* 9.6* 10.2*   HEMATOCRIT % 35.9 34.5 36.9   PLATELETS 10*3/mm3 634* 521* 441     Results from last 7 days   Lab Units 21  0758 21  2211 21  0602 21  0423 21  1159 21  0421   SODIUM mmol/L 137  --  139 137  --  141   POTASSIUM mmol/L 4.1 4.3 3.2*  4.1   < > 3.7   CHLORIDE mmol/L 101  --  99 99   < > 101   CO2 mmol/L 24.0  --  29.0 27.0   < > 27.0   BUN mg/dL 19  --  22* 19   < > 12   CREATININE mg/dL 0.56*  --  0.64 0.61   < > 0.81   GLUCOSE mg/dL 202*  --  170* 266*   < > 154*   CALCIUM mg/dL 9.9  --  10.2 10.0   < > 10.2   ALT (SGPT) U/L  --   --   --   --   --  53*   AST (SGOT) U/L  --   --   --   --   --  41*    < > = values in this interval not displayed.     Estimated Creatinine Clearance: 145.5 mL/min (A) (by C-G formula based on SCr of 0.56 mg/dL (L)).    Microbiology Results Abnormal     Procedure Component Value - Date/Time    Urine Culture - Urine, Urine, Clean Catch [[id number]]  (Normal) Collected: 02/26/21 1056    Lab Status: Final result Specimen: Urine, Clean Catch Updated: 02/27/21 1207     Urine Culture No growth    MRSA Screen, PCR (Inpatient) - Swab, Nares [[id number]]  (Normal) Collected: 02/24/21 1442    Lab Status: Final result Specimen: Swab from Nares Updated: 02/24/21 1908     MRSA PCR Negative    Narrative:      MRSA Negative    COVID PRE-OP / PRE-PROCEDURE SCREENING ORDER (NO ISOLATION) - Swab, Nasopharynx [[id number]]  (Normal) Collected: 02/21/21 2252    Lab Status: Final result Specimen: Swab from Nasopharynx Updated: 02/21/21 7174    Narrative:      The following orders were created for panel order COVID PRE-OP / PRE-PROCEDURE SCREENING ORDER (NO ISOLATION) - Swab, Nasopharynx.  Procedure                               Abnormality         Status                     ---------                               -----------         ------                     COVID-19 and FLU A/B PCR...[[id number]]  Normal              Final result                 Please view results for these tests on the individual orders.    COVID-19 and FLU A/B PCR - Swab, Nasopharynx [[id number]]  (Normal) Collected: 02/21/21 2252    Lab Status: Final result Specimen: Swab from Nasopharynx Updated: 02/21/21 2324     COVID19 Not Detected     Influenza A PCR Not Detected      Influenza B PCR Not Detected    Narrative:      Fact sheet for providers: https://www.fda.gov/media/261179/download    Fact sheet for patients: https://www.fda.gov/media/736812/download    Test performed by PCR.          Imaging Results (Last 24 Hours)     ** No results found for the last 24 hours. **              I have reviewed the medications:  Scheduled Meds:carvedilol, 6.25 mg, Nasogastric, BID With Meals  enoxaparin, 40 mg, Subcutaneous, Q24H  folic acid, 1 mg, Nasogastric, Daily  hydroCHLOROthiazide, 12.5 mg, Nasogastric, Daily  insulin regular, 0-7 Units, Subcutaneous, Q6H  lansoprazole, 30 mg, Nasogastric, BID AC  levothyroxine, 88 mcg, Nasogastric, Q AM  melatonin, 10 mg, Nasogastric, Nightly  multivitamin chewable, 1 tablet, Nasogastric, Daily  potassium chloride, 30 mEq, Nasogastric, Daily  sennosides, 10 mL, Nasogastric, BID  sodium chloride, 10 mL, Intravenous, Q12H  thiamine, 100 mg, Nasogastric, Daily      Continuous Infusions:   PRN Meds:.•  acetaminophen  •  diazePAM  •  influenza vaccine  •  magnesium sulfate  •  ondansetron  •  [DISCONTINUED] potassium chloride **OR** potassium chloride **OR** potassium chloride  •  sodium chloride    Assessment/Plan   Assessment & Plan     Active Hospital Problems    Diagnosis  POA   • **Suicide attempt (CMS/[facility]) [T14.91XA]  Yes   • Bipolar 1 disorder (CMS/[facility]) [F31.9]  Yes   • GERD (gastroesophageal reflux disease) [K21.9]  Yes   • Pharyngeal dysphagia [R13.13]  Yes   • Probable Aspiration Pneumonia (CMS/[facility]) [J69.0]  Yes   • Hypothyroidism (acquired) [E03.9]  Yes   • 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         Brief Hospital Course to date:  Elvia Montero is a 45 y.o. female [place] with bipolar disorder/OCD admitted 2/21/2021 following suicide attempt (1/2 cup Mr. John, bottle of wine, Klonopin, Seroquel) seen by GI with EGD 2/22/2021 with esophagitis and minimal injury; subsequently with alcohol withdrawal, treated with Zosyn for aspiration pneumonia; she failed FEES 3/20/2021 with severe pharyngeal dysphagia due to edema/secretions and has been with an NG tube and on tube feeds since that time, also complicated with hyponatremia per nephrology thought to be nephrogenic diabetes insipidus from lithium (post D5, HCTZ, DDAVP) transferred to the PCU 3/4/2021 and subsequently the hospitalist service on 3/7/2021    The following problems new to me today    Assessment/plan    Severe pharyngeal dysphagia  Esophagitis due to toxic injury  -Failed FEES 3/3/2021  -Continue tube feeds via Keofeed  -SLP following, possible reevaluation tomorrow?    Suicide attempt  -Ingestion Mr. John, bottle of wine, Klonopin, Seroquel  -Has sitter, suicide precautions  -Agreeable to discharge to inpatient psych facility, has been at the [facility] previously, addiction team following    Thrombocythemia  -Platelets precipitously increasing, recheck in the a.m., if continues to elevate will consider hematology consultation    S/p alcohol withdrawal  Alcohol abuse with dependence  -Very infrequent need for Valium, decrease dose    S/p nephrogenic diabetes insipidus with hypernatremia  -Felt to be related to chronic lithium use  -S/p DDAVP  -Continue HCTZ, started by nephrology  -Nephrology signed off 3/6/2021    Iron deficiency anemia  -S/p IV iron per nephrology    Hypothyroidism  -Continue Synthroid    [place], refuses blood products per prior documentation    DVT Prophylaxis: Lovenox      Disposition: I expect the patient to be discharged this week to inpatient psychiatric facility, remaining hospital problems include  thrombocythemia which is likely reactive and need for long-term enteral nutrition.    CODE STATUS:   Code Status and Medical Interventions:   Ordered at: 02/21/21 2046     Code Status:    CPR     Medical Interventions (Level of Support Prior to Arrest):    Full       Fred Langley DO  03/07/21                Electronically signed by Fred Langley DO at 03/07/21 1504       Consult Notes (last 48 hours) (Notes from 03/06/21 1140 through 03/08/21 1140)    No notes of this type exist for this encounter.

## 2021-03-08 NOTE — PROGRESS NOTES
Continued Stay Note  [facility]     Patient Name: Elvia Montero  MRN: [id number]  Today's Date: 3/8/2021    Admit Date: 2/21/2021    Discharge Plan     Row Name 03/08/21 1018       Plan    Plan  Social work spoke with the patients mother Abbie Izquierdo, [phone] and let her know that Thomas will complete an evaluation by phone on the day of discharge possibly Tuesday.        Discharge Codes    No documentation.       Expected Discharge Date and Time     Expected Discharge Date Expected Discharge Time    Mar 6, 2021             ZARIA Morales

## 2021-03-08 NOTE — PLAN OF CARE
Goal Outcome Evaluation:  Plan of Care Reviewed With: patient  Progress: no change   SLP FEES re-evaluation completed. Will continue to address mild oral and mild-moderate pharyngeal dysphagia during treatment. Please see note for further details and recommendations.

## 2021-03-09 LAB
GLUCOSE BLDC GLUCOMTR-MCNC: 106 MG/DL (ref 70–130)
GLUCOSE BLDC GLUCOMTR-MCNC: 156 MG/DL (ref 70–130)
GLUCOSE BLDC GLUCOMTR-MCNC: 177 MG/DL (ref 70–130)

## 2021-03-09 PROCEDURE — 25010000002 HYDRALAZINE PER 20 MG: Performed by: NURSE PRACTITIONER

## 2021-03-09 PROCEDURE — 92526 ORAL FUNCTION THERAPY: CPT

## 2021-03-09 PROCEDURE — 82962 GLUCOSE BLOOD TEST: CPT

## 2021-03-09 PROCEDURE — 99232 SBSQ HOSP IP/OBS MODERATE 35: CPT | Performed by: NURSE PRACTITIONER

## 2021-03-09 PROCEDURE — 25010000002 ENOXAPARIN PER 10 MG: Performed by: INTERNAL MEDICINE

## 2021-03-09 PROCEDURE — 63710000001 INSULIN LISPRO (HUMAN) PER 5 UNITS: Performed by: NURSE PRACTITIONER

## 2021-03-09 RX ORDER — CARVEDILOL 12.5 MG/1
12.5 TABLET ORAL 2 TIMES DAILY WITH MEALS
Status: DISCONTINUED | OUTPATIENT
Start: 2021-03-09 | End: 2021-03-09

## 2021-03-09 RX ORDER — CARVEDILOL 6.25 MG/1
6.25 TABLET ORAL 2 TIMES DAILY WITH MEALS
Status: DISCONTINUED | OUTPATIENT
Start: 2021-03-09 | End: 2021-03-09

## 2021-03-09 RX ORDER — HYDROCHLOROTHIAZIDE 12.5 MG/1
12.5 TABLET ORAL DAILY
Status: DISCONTINUED | OUTPATIENT
Start: 2021-03-09 | End: 2021-03-11 | Stop reason: HOSPADM

## 2021-03-09 RX ORDER — FOLIC ACID 1 MG/1
1 TABLET ORAL DAILY
Status: DISCONTINUED | OUTPATIENT
Start: 2021-03-09 | End: 2021-03-11 | Stop reason: HOSPADM

## 2021-03-09 RX ORDER — POTASSIUM CHLORIDE 1.5 G/1.77G
30 POWDER, FOR SOLUTION ORAL DAILY
Status: DISCONTINUED | OUTPATIENT
Start: 2021-03-09 | End: 2021-03-11 | Stop reason: HOSPADM

## 2021-03-09 RX ORDER — PAROXETINE HYDROCHLORIDE 20 MG/1
20 TABLET, FILM COATED ORAL DAILY
Status: DISCONTINUED | OUTPATIENT
Start: 2021-03-09 | End: 2021-03-11 | Stop reason: HOSPADM

## 2021-03-09 RX ORDER — SENNA PLUS 8.6 MG/1
2 TABLET ORAL 2 TIMES DAILY
Status: DISCONTINUED | OUTPATIENT
Start: 2021-03-09 | End: 2021-03-11 | Stop reason: HOSPADM

## 2021-03-09 RX ORDER — LEVOTHYROXINE SODIUM 88 UG/1
88 TABLET ORAL
Status: DISCONTINUED | OUTPATIENT
Start: 2021-03-10 | End: 2021-03-11 | Stop reason: HOSPADM

## 2021-03-09 RX ORDER — HYDRALAZINE HYDROCHLORIDE 20 MG/ML
10 INJECTION INTRAMUSCULAR; INTRAVENOUS EVERY 6 HOURS PRN
Status: DISCONTINUED | OUTPATIENT
Start: 2021-03-09 | End: 2021-03-11 | Stop reason: HOSPADM

## 2021-03-09 RX ORDER — CHOLECALCIFEROL (VITAMIN D3) 125 MCG
10 CAPSULE ORAL NIGHTLY
Status: DISCONTINUED | OUTPATIENT
Start: 2021-03-09 | End: 2021-03-11 | Stop reason: HOSPADM

## 2021-03-09 RX ORDER — CARVEDILOL 12.5 MG/1
12.5 TABLET ORAL 2 TIMES DAILY WITH MEALS
Status: DISCONTINUED | OUTPATIENT
Start: 2021-03-09 | End: 2021-03-11 | Stop reason: HOSPADM

## 2021-03-09 RX ADMIN — DIAZEPAM 5 MG: 5 TABLET ORAL at 04:38

## 2021-03-09 RX ADMIN — SENNOSIDES 2 TABLET: 8.6 TABLET, FILM COATED ORAL at 20:19

## 2021-03-09 RX ADMIN — SODIUM CHLORIDE, PRESERVATIVE FREE 10 ML: 5 INJECTION INTRAVENOUS at 20:19

## 2021-03-09 RX ADMIN — CARVEDILOL 12.5 MG: 12.5 TABLET, FILM COATED ORAL at 09:11

## 2021-03-09 RX ADMIN — THIAMINE HCL TAB 100 MG 100 MG: 100 TAB at 09:08

## 2021-03-09 RX ADMIN — SENNOSIDES 2 TABLET: 8.6 TABLET, FILM COATED ORAL at 09:07

## 2021-03-09 RX ADMIN — ENOXAPARIN SODIUM 40 MG: 40 INJECTION SUBCUTANEOUS at 09:06

## 2021-03-09 RX ADMIN — INSULIN LISPRO 2 UNITS: 100 INJECTION, SOLUTION INTRAVENOUS; SUBCUTANEOUS at 09:06

## 2021-03-09 RX ADMIN — LANSOPRAZOLE 30 MG: KIT at 09:06

## 2021-03-09 RX ADMIN — POTASSIUM CHLORIDE 30 MEQ: 1.5 POWDER, FOR SOLUTION ORAL at 09:07

## 2021-03-09 RX ADMIN — INSULIN LISPRO 2 UNITS: 100 INJECTION, SOLUTION INTRAVENOUS; SUBCUTANEOUS at 18:07

## 2021-03-09 RX ADMIN — LEVOTHYROXINE SODIUM 88 MCG: 88 TABLET ORAL at 04:39

## 2021-03-09 RX ADMIN — FOLIC ACID 1 MG: 1 TABLET ORAL at 09:08

## 2021-03-09 RX ADMIN — DIAZEPAM 5 MG: 5 TABLET ORAL at 10:39

## 2021-03-09 RX ADMIN — Medication 1 TABLET: at 09:07

## 2021-03-09 RX ADMIN — PAROXETINE HYDROCHLORIDE 20 MG: 20 TABLET, FILM COATED ORAL at 11:56

## 2021-03-09 RX ADMIN — CARVEDILOL 12.5 MG: 12.5 TABLET, FILM COATED ORAL at 18:07

## 2021-03-09 RX ADMIN — DIAZEPAM 5 MG: 5 TABLET ORAL at 18:06

## 2021-03-09 RX ADMIN — SODIUM CHLORIDE, PRESERVATIVE FREE 10 ML: 5 INJECTION INTRAVENOUS at 09:11

## 2021-03-09 RX ADMIN — Medication 10 MG: at 20:19

## 2021-03-09 RX ADMIN — HYDROCHLOROTHIAZIDE 12.5 MG: 12.5 CAPSULE ORAL at 09:07

## 2021-03-09 NOTE — PROGRESS NOTES
[facility] Medicine Services  PROGRESS NOTE    Patient Name: Elvia Montero  : 1975  MRN: [id number]    Date of Admission: 2021  Primary Care Physician: Provider, No Known    Subjective   Subjective     CC:  Follow up ICU stepdown, suicide attempt     HPI:  Patient sitting up in bed in NAD. She feels better today. She says she still feels foggy headed at times. She seems more alert and interactive today and mentally processing things better.  Patient is still agreeable to going to the [facility]     ROS:  Gen- No fevers, chills  CV- No chest pain, palpitations  Resp- No cough, dyspnea  GI- No N/V/D, abd pain         Objective   Objective     Vital Signs:   Temp:  [97.5 °F (36.4 °C)-98.6 °F (37 °C)] 97.5 °F (36.4 °C)  Heart Rate:  [] 82  Resp:  [16] 16  BP: (146-179)/() 158/92        Physical Exam:  Constitutional: No acute distress, awake, alert  HENT: NCAT, mucous membranes moist,   Respiratory: Clear to auscultation bilaterally, respiratory effort normal, room air  96%  Cardiovascular: RRR, no murmurs, rubs, or gallops  Gastrointestinal: Positive bowel sounds, soft, nontender, nondistended  Musculoskeletal: No bilateral ankle edema  Psychiatric: Appropriate affect, cooperative  Neurologic: Oriented x 3, strength symmetric in all extremities, Cranial Nerves grossly intact to confrontation, speech clear  Skin: No rashes      Results Reviewed:  Results from last 7 days   Lab Units 21  0659 21  0855 21  0602   WBC 10*3/mm3 8.08 9.23 8.05   HEMOGLOBIN g/dL 10.3* 10.4* 10.0*   HEMATOCRIT % 37.0 36.2 35.9   PLATELETS 10*3/mm3 778* 855* 634*     Results from last 7 days   Lab Units 21  0659 21  0855 21  0758   SODIUM mmol/L 138 139 137   POTASSIUM mmol/L 3.6 4.6 4.1   CHLORIDE mmol/L 102 103 101   CO2 mmol/L 22.0 26.0 24.0   BUN mg/dL 15 16 19   CREATININE mg/dL 0.58 0.58 0.56*   GLUCOSE mg/dL 170* 206* 202*   CALCIUM mg/dL 9.5 10.5 9.9    ALT (SGPT) U/L 52* 45*  --    AST (SGOT) U/L 31 30  --      Estimated Creatinine Clearance: 139.7 mL/min (by C-G formula based on SCr of 0.58 mg/dL).    Microbiology Results Abnormal     Procedure Component Value - Date/Time    Urine Culture - Urine, Urine, Clean Catch [[id number]]  (Normal) Collected: 02/26/21 1056    Lab Status: Final result Specimen: Urine, Clean Catch Updated: 02/27/21 1207     Urine Culture No growth    MRSA Screen, PCR (Inpatient) - Swab, Nares [[id number]]  (Normal) Collected: 02/24/21 1442    Lab Status: Final result Specimen: Swab from Nares Updated: 02/24/21 1908     MRSA PCR Negative    Narrative:      MRSA Negative    COVID PRE-OP / PRE-PROCEDURE SCREENING ORDER (NO ISOLATION) - Swab, Nasopharynx [[id number]]  (Normal) Collected: 02/21/21 2252    Lab Status: Final result Specimen: Swab from Nasopharynx Updated: 02/21/21 2324    Narrative:      The following orders were created for panel order COVID PRE-OP / PRE-PROCEDURE SCREENING ORDER (NO ISOLATION) - Swab, Nasopharynx.  Procedure                               Abnormality         Status                     ---------                               -----------         ------                     COVID-19 and FLU A/B PCR...[[id number]]  Normal              Final result                 Please view results for these tests on the individual orders.    COVID-19 and FLU A/B PCR - Swab, Nasopharynx [[id number]]  (Normal) Collected: 02/21/21 2252    Lab Status: Final result Specimen: Swab from Nasopharynx Updated: 02/21/21 2324     COVID19 Not Detected     Influenza A PCR Not Detected     Influenza B PCR Not Detected    Narrative:      Fact sheet for providers: https://www.fda.gov/media/156566/download    Fact sheet for patients: https://www.fda.gov/media/435260/download    Test performed by PCR.          Imaging Results (Last 24 Hours)     ** No results found for the last 24 hours. **              I have reviewed the medications:  Scheduled  Meds:carvedilol, 12.5 mg, Oral, BID With Meals  enoxaparin, 40 mg, Subcutaneous, Q24H  folic acid, 1 mg, Oral, Daily  hydroCHLOROthiazide, 12.5 mg, Oral, Daily  insulin lispro, 0-7 Units, Subcutaneous, 4x Daily With Meals & Nightly  lansoprazole, 30 mg, Nasogastric, BID AC  [START ON 3/10/2021] levothyroxine, 88 mcg, Oral, Q AM  melatonin, 10 mg, Oral, Nightly  multivitamin chewable, 1 tablet, Oral, Daily  PARoxetine, 20 mg, Oral, Daily  potassium chloride, 30 mEq, Oral, Daily  senna, 2 tablet, Oral, BID  sodium chloride, 10 mL, Intravenous, Q12H  thiamine, 100 mg, Oral, Daily      Continuous Infusions:   PRN Meds:.•  acetaminophen  •  diazePAM  •  hydrALAZINE  •  influenza vaccine  •  magnesium sulfate  •  ondansetron  •  [DISCONTINUED] potassium chloride **OR** potassium chloride **OR** potassium chloride  •  sodium chloride    Assessment/Plan   Assessment & Plan     Active Hospital Problems    Diagnosis  POA   • **Suicide attempt (CMS/[facility]) [T14.91XA]  Yes   • Bipolar 1 disorder (CMS/[facility]) [F31.9]  Yes   • GERD (gastroesophageal reflux disease) [K21.9]  Yes   • Pharyngeal dysphagia [R13.13]  Yes   • Probable Aspiration Pneumonia (CMS/[facility]) [J69.0]  Yes   • Hypothyroidism (acquired) [E03.9]  Yes   • 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        Brief Hospital Course to date:  Elvia Montero is a 45 y.o. female [place] with bipolar disorder/OCD admitted 2/21/2021 following suicide attempt (1/2 cup Mr. 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 John, bottle of wine, Klonopin, Seroquel  -Has sitter, suicide precautions  -Agreeable to discharge to inpatient psych facility, has been at the [facility] previously, addiction team following    Anxiety  -- lithium stopped due to neurogenic DI  -- restart Paxil today for increased anxiety     HTN  -- coreg and HCTZ started  - increase coreg today for elevated bp  -- hydralazine prn added     Pre-diabetic  -- A1c was 6.1%  -- monitor accu and cont LDSSI     Thrombocythemia  -Platelets precipitously increasing, improved 3/8, if continues to elevate will consider hematology consultation     S/p alcohol withdrawal  Alcohol abuse with dependence  -Very infrequent need for Valium, decrease dose     S/p nephrogenic diabetes insipidus with hypernatremia  -Felt to be related to chronic lithium use  -S/p DDAVP  -Continue HCTZ, started by nephrology  -Nephrology signed off 3/6/2021     Iron deficiency anemia  -S/p IV iron per nephrology     Hypothyroidism  -Continue Synthroid     Tenriism, refuses blood products per prior documentation       DVT Prophylaxis:  lovenox       Disposition: I expect the patient  to be discharged possibly on Thursday to  inpatient psychiatric facility once speech re-evaluates and she can be placed on reg diet. [facility] does not take anyone on a modified diet      CODE STATUS:   Code Status and Medical Interventions:   Ordered at: 02/21/21 2046     Code Status:    CPR     Medical Interventions (Level of Support Prior to Arrest):    Full       Marie Tomlinson, APRN  03/09/21

## 2021-03-09 NOTE — THERAPY TREATMENT NOTE
Acute Care - Speech Language Pathology   Swallow Treatment Note [facility]     Patient Name: Elvia Montero  : 1975  MRN: [id number]  Today's Date: 3/9/2021               Admit Date: 2021    Visit Dx:     ICD-10-CM ICD-9-CM   1. Suicide attempt (CMS/[facility])  T14.91XA E958.9   2. Toxic effect of caustic substance, intentional self-harm, initial encounter (CMS/[facility])  T54.92XA 983.9     E950.7   3. Esophagitis  K20.90 530.10   4. Alcoholic intoxication without complication (CMS/[facility])  F10.920 305.00   5. 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[facility])   • Bipolar 1 disorder (CMS/[facility])   • Obsessive compulsive disorder   • GERD (gastroesophageal reflux disease)   • Pharyngeal dysphagia   • Probable Aspiration Pneumonia (CMS/[facility])   • Hypothyroidism (acquired)     Past Medical History:   Diagnosis Date   • Anxiety    • Bipolar 1 disorder (CMS/[facility])    • Depression    • Disease of thyroid gland    • Esophagitis    • Gastroparesis    • GERD (gastroesophageal reflux disease)    • Obsessive compulsive disorder      Past Surgical History:   Procedure Laterality Date   • ENDOSCOPY N/A 2021    Procedure: ESOPHAGOGASTRODUODENOSCOPY;  Surgeon: Chaparro Chester MD;  Location: [facility];  Service: Gastroenterology;  Laterality: N/A;   • NECK SURGERY          SWALLOW EVALUATION (last 72 hours)      SLP Adult Swallow Evaluation     Row Name 21 1500                Rehab Evaluation    Document Type  therapy note (daily note)  (Pended)   -MT       Subjective Information  no complaints  (Pended)   -MT       Patient Observations  alert;cooperative;agree to therapy  (Pended)   -MT        Patient/Family/Caregiver Comments/Observations  Sitter present   (Pended)   -MT       Care Plan Review  evaluation/treatment results reviewed;care plan/treatment goals reviewed;risks/benefits reviewed;current/potential barriers reviewed;patient/other agree to care plan  (Pended)   -MT       Patient Effort  good  (Pended)   -MT       Symptoms Noted During/After Treatment  none  (Pended)   -MT          General Information    Patient Profile Reviewed  --       Pertinent History Of Current Problem  --       Current Method of Nutrition  --       Precautions/Limitations, Vision  --       Precautions/Limitations, Hearing  --       Prior Level of Function-Communication  --       Prior Level of Function-Swallowing  --       Plans/Goals Discussed with  --       Barriers to Rehab  --       Patient's Goals for Discharge  --          Pain    Additional Documentation  Pain Scale: Numbers Pre/Post-Treatment (Group)  (Pended)   -MT          Pain Scale: Numbers Pre/Post-Treatment    Pretreatment Pain Rating  0/10 - no pain  (Pended)   -MT       Posttreatment Pain Rating  0/10 - no pain  (Pended)   -MT          Fiberoptic Endoscopic Evaluation of Swallowing (FEES)    Risks/Benefits Reviewed  --       Nasal Entry  --       Scope serial number/identification  --          Anatomy and Physiology    Anatomic Considerations  --       Comment  --       Velopharyngeal  --       Base of Tongue  --       Epiglottis  --       Laryngeal Function Breathing  --       Laryngeal Function Phonation  --       Laryngeal Function to Breath Hold  --       Secretion Rating Scale (Shailesh et al. 1996)  --       Secretion Description  --       Ice Chips  --       Spontaneous Swallow  --       Sensory  --       Utensils Used  --       Consistencies Trialed  --          FEES Interpretation    Oral Phase  --       Oral Phase, Comment  --          Initiation of Pharyngeal Swallow    Initiation of Pharyngeal Swallow  --       Pharyngeal Phase  --        Penetration Before the Swallow  --       Aspiration During the Swallow  --       Penetration After the Swallow  --       Response to Penetration  --       Response to Aspiration  --       Rosenbek's Scale  --       Residue  --       Response to Residue  --       Attempted Compensatory Maneuvers  --       Response to Attempted Compensatory Maneuvers  --       Pharyngeal Phase, Comment  --          Clinical Impression    Daily Summary of Progress (SLP)  progress toward functional goals is good  (Pended)   -MT       SLP Swallowing Diagnosis  --       Functional Impact  --       Rehab Potential/Prognosis, Swallowing  --       Swallow Criteria for Skilled Therapeutic Interventions Met  --       Plan for Continued Treatment (SLP)  Reviewed swallowing exercises, handout left at bedside. Good participation. Tolerating current diet. Delayed cough noted following PO trials however pt and NA report this has been happening when she talks due to sore throat from keofeed being pulled. Will continue to monitor diet tolerance closely. Pt has plans to d/c to [facility] if able to upgrade to regular diet. Plan for instrumental on Thursday to reasses dysphagia.   (Pended)   -MT          Recommendations    Therapy Frequency (Swallow)  5 days per week  (Pended)   -MT       Predicted Duration Therapy Intervention (Days)  until discharge  (Pended)   -MT       SLP Diet Recommendation  puree with some mashed;nectar thick liquids;ice chips between meals after oral care, with supervision  (Pended)  No straw   -MT       Recommended Diagnostics  --       Recommended Precautions and Strategies  upright posture during/after eating;small bites of food and sips of liquid;no straw;multiple swallows per bite of food;multiple swallows per sip of liquid;general aspiration precautions  (Pended)   -MT       Oral Care Recommendations  Oral Care BID/PRN  (Pended)   -MT       SLP Rec. for Method of Medication Administration  meds crushed;with pudding or  applesauce;as tolerated  (Pended)   -MT       Monitor for Signs of Aspiration  yes;notify SLP if any concerns  (Pended)   -MT       Anticipated Discharge Disposition (SLP)  unknown;anticipate therapy at next level of care  (Pended)   -MT         User Key  (r) = Recorded By, (t) = Taken By, (c) = Cosigned By    Initials Name Effective Dates    RD Sugar Sherry ANGELES, MS CCC-SLP 04/03/18 -     Isela Nair, Speech Therapy Student 01/14/21 -           EDUCATION  The patient has been educated in the following areas:   Dysphagia (Swallowing Impairment) Oral Care/Hydration Modified Diet Instruction.    SLP Recommendation and Plan     SLP Diet Recommendation: (P) puree with some mashed, nectar thick liquids, ice chips between meals after oral care, with supervision (No straw )  Recommended Precautions and Strategies: (P) upright posture during/after eating, small bites of food and sips of liquid, no straw, multiple swallows per bite of food, multiple swallows per sip of liquid, general aspiration precautions  SLP Rec. for Method of Medication Administration: (P) meds crushed, with pudding or applesauce, as tolerated     Monitor for Signs of Aspiration: (P) yes, notify SLP if any concerns        Anticipated Discharge Disposition (SLP): (P) unknown, anticipate therapy at next level of care     Therapy Frequency (Swallow): (P) 5 days per week  Predicted Duration Therapy Intervention (Days): (P) until discharge     Daily Summary of Progress (SLP): (P) progress toward functional goals is good    Plan for Continued Treatment (SLP): (P) Reviewed swallowing exercises, handout left at bedside. Good participation. Tolerating current diet. Delayed cough noted following PO trials however pt and NA report this has been happening when she talks due to sore throat from keofeed being pulled. Will continue to monitor diet tolerance closely. Pt has plans to d/c to [facility] if able to upgrade to regular diet. Plan for instrumental on  Thursday to reasses dysphagia.               Plan of Care Reviewed With: (P) patient    SLP GOALS     Row Name 03/09/21 1500          Oral Nutrition/Hydration Goal 1 (SLP)    Oral Nutrition/Hydration Goal 1, SLP  LTG: Pt will return to regular diet & thin liquids w/ no overt s/s of aspiration w/ 100% accuracy w/o cues  (Pended)   -MT     Time Frame (Oral Nutrition/Hydration Goal 1, SLP)  by discharge  (Pended)   -MT     Progress/Outcomes (Oral Nutrition/Hydration Goal 1, SLP)  continuing progress toward goal  (Pended)   -MT        Oral Nutrition/Hydration Goal 2 (SLP)    Oral Nutrition/Hydration Goal 2, SLP  Pt will tolerate nectar-thick liquids and Level 3-pureed w/ some mashed diet w/o s/s of aspiration w/ 100% accuracy w/o cues  (Pended)   -MT     Time Frame (Oral Nutrition/Hydration Goal 2, SLP)  short term goal (STG)  (Pended)   -MT     Barriers (Oral Nutrition/Hydration Goal 2, SLP)  Delayed cough following PO trials, NA and Pt state this has been happening when patient talks, suspect not related to swallow.   (Pended)   -MT     Progress/Outcomes (Oral Nutrition/Hydration Goal 2, SLP)  continuing progress toward goal  (Pended)   -MT        Lingual Strengthening Goal 1 (SLP)    Activity (Lingual Strengthening Goal 1, SLP)  increase lingual tone/sensation/control/coordination/movement;increase tongue back strength  (Pended)   -MT     Increase Lingual Tone/Sensation/Control/Coordination/Movement  lingual resistance exercises  (Pended)   -MT     Increase Tongue Back Strength  swallow trials;lingual resistance exercises  (Pended)   -MT     [place]/Accuracy (Lingual Strengthening Goal 1, SLP)  with minimal cues (75-90% accuracy)  (Pended)   -MT     Time Frame (Lingual Strengthening Goal 1, SLP)  short term goal (STG)  (Pended)   -MT     Progress/Outcomes (Lingual Strengthening Goal 1, SLP)  continuing progress toward goal  (Pended)   -MT        Pharyngeal Strengthening Exercise Goal 1 (SLP)    Activity  (Pharyngeal Strengthening Goal 1, SLP)  increase timing;increase anterior movement of the hyolaryngeal complex;increase closure at entrance to airway/closure of airway at glottis;increase tongue base retraction  (Pended)   -MT     Increase Timing  prepping - 3 second prep or suck swallow or 3-step swallow  (Pended)   -MT     Increase Anterior Movement of the Hyolaryngeal Complex  chin tuck against resistance (CTAR)  (Pended)   -MT     Increase Closure at Entrance to Airway/Closure of Airway at Glottis  supraglottic swallow  (Pended)   -MT     Increase Squeeze/Positive Pressure Generation  effortful pitch glide (falsetto + pharyngeal squeeze)  (Pended)   -MT     Increase Tongue Base Retraction  hard effortful swallow;eron  (Pended)   -MT     [place]/Accuracy (Pharyngeal Strengthening Goal 1, SLP)  with minimal cues (75-90% accuracy)  (Pended)   -MT     Time Frame (Pharyngeal Strengthening Goal 1, SLP)  short term goal (STG)  (Pended)   -MT     Barriers (Pharyngeal Strengthening Goal 1, SLP)  Completed all x5, good participation, delayed initiation of swallow w/ three second prep and supraglottic.   (Pended)   -MT     Progress/Outcomes (Pharyngeal Strengthening Goal 1, SLP)  continuing progress toward goal  (Pended)   -MT        Swallow Compensatory Strategies Goal 1 (SLP)    Activity (Swallow Compensatory Strategies/Techniques Goal 1, SLP)  compensatory strategies;postural techniques;small bites;small cup sips;extra swallow per bolus  (Pended)   -MT     [place]/Accuracy (Swallow Compensatory Strategies/Techniques Goal 1, SLP)  independently (over 90% accuracy)  (Pended)   -MT     Time Frame (Swallow Compensatory Strategies/Techniques Goal 1, SLP)  short term goal (STG)  (Pended)   -MT     Barriers (Swallow Compensatory Strategies/Techniques Goal 1, SLP)  Pt reports use of strategies during meals and demonstrated independent use during PO trials.   (Pended)   -MT     Progress/Outcomes (Swallow Compensatory  Strategies/Techniques Goal 1, SLP)  continuing progress toward goal  (Pended)   -MT       User Key  (r) = Recorded By, (t) = Taken By, (c) = Cosigned By    Initials Name Provider Type    Sherry Chávez MS CCC-SLP Speech and Language Pathologist    Isela Nair, Speech Therapy Student Speech Therapy Student             Time Calculation:   Time Calculation- SLP     Row Name 03/09/21 1500             Time Calculation- SLP    SLP Start Time  1500  (Pended)   -MT      SLP Received On  03/09/21  (Pended)   -MT        User Key  (r) = Recorded By, (t) = Taken By, (c) = Cosigned By    Initials Name Provider Type    Isela Nair, Speech Therapy Student Speech Therapy Student          Therapy Charges for Today     Code Description Service Date Service Provider Modifiers Qty    [id number] HC ST TREATMENT SWALLOW 3 3/9/2021 Isela Castellano, Speech Therapy Student GN 1            Patient was not wearing a face mask and did exhibit coughing during this therapy encounter.  Procedure performed was aerosolizing, involved close contact (within 6 feet for at least 15 minutes or longer), and did not involve contact with infectious secretions or specimens.  Therapist used appropriate personal protective equipment including gloves, standard procedure mask and eye protection.  Appropriate PPE was worn during the entire therapy session.  Hand hygiene was completed before and after therapy session.       Isela Castellano Speech Therapy Student  3/9/2021

## 2021-03-09 NOTE — PLAN OF CARE
Goal Outcome Evaluation:  Plan of Care Reviewed With: (P) patient      SLP dysphagia treatment completed. Will continue to address. Please see note for further details and recommendations.

## 2021-03-09 NOTE — PROGRESS NOTES
Continued Stay Note  [facility]     Patient Name: Elvia Montero  MRN: [id number]  Today's Date: 3/9/2021    Admit Date: 2/21/2021    Discharge Plan     Row Name 03/09/21 1107       Plan    Plan  Social work is following the patient and the patient will have a [facility] telephone assessment on Thursday and she will be evaluated again by speech every day. The patient is not able to go to [facility] if she has a modified diet. Her psychiatrist is at the [facility] and she wants to be able to go to [facility] if that is possible.        Discharge Codes    No documentation.       Expected Discharge Date and Time     Expected Discharge Date Expected Discharge Time    Mar 6, 2021             ZARIA Morales

## 2021-03-09 NOTE — PLAN OF CARE
Goal Outcome Evaluation:  Plan of Care Reviewed With: patient  Progress: improving  Outcome Summary: BP improved this afternoon with increased coreg. Pt is doing well with diet. No coughing or trouble swollowing. Sitter at bedside. No suicide ideations. Pt is in good spirits and hopeful to get to [facility] soon when she is on a normal diet again.    Problem: Alcohol Withdrawal  Goal: Alcohol Withdrawal Symptom Control  Outcome: Ongoing, Progressing     Problem: Aspiration (Enteral Nutrition)  Goal: Absence of Aspiration Signs and Symptoms  Outcome: Ongoing, Progressing  Intervention: Minimize Aspiration Risk  Recent Flowsheet Documentation  Taken 3/9/2021 1600 by Cathleen Espino RN  Head of Bed (HOB): HOB at 60 degrees  Taken 3/9/2021 1414 by Cathleen Espino RN  Head of Bed (HOB): HOB at 60-90 degrees  Taken 3/9/2021 1230 by Cathleen Espino RN  Head of Bed (HOB): HOB at 60 degrees  Taken 3/9/2021 1033 by Cathleen Espino RN  Head of Bed (HOB): HOB at 60-90 degrees  Taken 3/9/2021 0842 by Cathleen Espino RN  Head of Bed (HOB): HOB at 60 degrees  Taken 3/9/2021 0704 by Cathleen Espino RN  Head of Bed (HOB): HOB at 15 degrees     Problem: Skin Injury Risk Increased  Goal: Skin Health and Integrity  Outcome: Ongoing, Progressing  Intervention: Optimize Skin Protection  Recent Flowsheet Documentation  Taken 3/9/2021 1600 by Cathleen Espino RN  Head of Bed (HOB): HOB at 60 degrees  Taken 3/9/2021 1414 by Cathleen Espino RN  Head of Bed (HOB): HOB at 60-90 degrees  Taken 3/9/2021 1230 by Cathleen Espino RN  Head of Bed (HOB): HOB at 60 degrees  Taken 3/9/2021 1033 by Cathleen Espino RN  Head of Bed (HOB): HOB at 60-90 degrees  Taken 3/9/2021 0842 by Cathleen Espino RN  Head of Bed (HOB): HOB at 60 degrees  Taken 3/9/2021 0704 by Cathleen Espino RN  Head of Bed (HOB): HOB at 15 degrees

## 2021-03-09 NOTE — PLAN OF CARE
Addendum- Correction.. blood pressures have been elevated throughout the night . 154/94,164/95 and 161/92 HR - 80's-101..

## 2021-03-10 LAB
BASOPHILS # BLD AUTO: 0.04 10*3/MM3 (ref 0–0.2)
BASOPHILS NFR BLD AUTO: 0.9 % (ref 0–1.5)
DEPRECATED RDW RBC AUTO: 71.7 FL (ref 37–54)
EOSINOPHIL # BLD AUTO: 0.11 10*3/MM3 (ref 0–0.4)
EOSINOPHIL NFR BLD AUTO: 2.3 % (ref 0.3–6.2)
ERYTHROCYTE [DISTWIDTH] IN BLOOD BY AUTOMATED COUNT: 23.3 % (ref 12.3–15.4)
GLUCOSE BLDC GLUCOMTR-MCNC: 136 MG/DL (ref 70–130)
GLUCOSE BLDC GLUCOMTR-MCNC: 142 MG/DL (ref 70–130)
HCT VFR BLD AUTO: 39 % (ref 34–46.6)
HGB BLD-MCNC: 11.2 G/DL (ref 12–15.9)
IMM GRANULOCYTES # BLD AUTO: 0.06 10*3/MM3 (ref 0–0.05)
IMM GRANULOCYTES NFR BLD AUTO: 1.3 % (ref 0–0.5)
LYMPHOCYTES # BLD AUTO: 1.22 10*3/MM3 (ref 0.7–3.1)
LYMPHOCYTES NFR BLD AUTO: 26 % (ref 19.6–45.3)
MCH RBC QN AUTO: 24.7 PG (ref 26.6–33)
MCHC RBC AUTO-ENTMCNC: 28.7 G/DL (ref 31.5–35.7)
MCV RBC AUTO: 86.1 FL (ref 79–97)
MONOCYTES # BLD AUTO: 0.54 10*3/MM3 (ref 0.1–0.9)
MONOCYTES NFR BLD AUTO: 11.5 % (ref 5–12)
NEUTROPHILS NFR BLD AUTO: 2.73 10*3/MM3 (ref 1.7–7)
NEUTROPHILS NFR BLD AUTO: 58 % (ref 42.7–76)
NRBC BLD AUTO-RTO: 0 /100 WBC (ref 0–0.2)
PLATELET # BLD AUTO: 754 10*3/MM3 (ref 140–450)
PMV BLD AUTO: 10.1 FL (ref 6–12)
RBC # BLD AUTO: 4.53 10*6/MM3 (ref 3.77–5.28)
WBC # BLD AUTO: 4.7 10*3/MM3 (ref 3.4–10.8)

## 2021-03-10 PROCEDURE — 82962 GLUCOSE BLOOD TEST: CPT

## 2021-03-10 PROCEDURE — 99233 SBSQ HOSP IP/OBS HIGH 50: CPT | Performed by: INTERNAL MEDICINE

## 2021-03-10 PROCEDURE — 92526 ORAL FUNCTION THERAPY: CPT

## 2021-03-10 PROCEDURE — 25010000002 ONDANSETRON PER 1 MG: Performed by: NURSE PRACTITIONER

## 2021-03-10 PROCEDURE — 85025 COMPLETE CBC W/AUTO DIFF WBC: CPT | Performed by: NURSE PRACTITIONER

## 2021-03-10 PROCEDURE — 25010000002 ENOXAPARIN PER 10 MG: Performed by: INTERNAL MEDICINE

## 2021-03-10 RX ORDER — DIAZEPAM 2 MG/1
8 TABLET ORAL EVERY 6 HOURS PRN
Status: DISCONTINUED | OUTPATIENT
Start: 2021-03-10 | End: 2021-03-10

## 2021-03-10 RX ORDER — HYDROXYZINE HYDROCHLORIDE 25 MG/1
25 TABLET, FILM COATED ORAL 3 TIMES DAILY PRN
Status: DISCONTINUED | OUTPATIENT
Start: 2021-03-10 | End: 2021-03-11 | Stop reason: HOSPADM

## 2021-03-10 RX ORDER — DIAZEPAM 2 MG/1
6 TABLET ORAL EVERY 6 HOURS PRN
Status: DISCONTINUED | OUTPATIENT
Start: 2021-03-10 | End: 2021-03-11 | Stop reason: HOSPADM

## 2021-03-10 RX ORDER — LANSOPRAZOLE
30 KIT
Status: DISCONTINUED | OUTPATIENT
Start: 2021-03-10 | End: 2021-03-11 | Stop reason: HOSPADM

## 2021-03-10 RX ORDER — LORAZEPAM 0.5 MG/1
0.5 TABLET ORAL ONCE
Status: COMPLETED | OUTPATIENT
Start: 2021-03-10 | End: 2021-03-10

## 2021-03-10 RX ORDER — LORAZEPAM 0.5 MG/1
0.5 TABLET ORAL NIGHTLY PRN
Status: DISCONTINUED | OUTPATIENT
Start: 2021-03-10 | End: 2021-03-11 | Stop reason: HOSPADM

## 2021-03-10 RX ADMIN — PAROXETINE HYDROCHLORIDE 20 MG: 20 TABLET, FILM COATED ORAL at 08:23

## 2021-03-10 RX ADMIN — SODIUM CHLORIDE, PRESERVATIVE FREE 10 ML: 5 INJECTION INTRAVENOUS at 08:24

## 2021-03-10 RX ADMIN — DIAZEPAM 6 MG: 2 TABLET ORAL at 20:00

## 2021-03-10 RX ADMIN — ENOXAPARIN SODIUM 40 MG: 40 INJECTION SUBCUTANEOUS at 08:24

## 2021-03-10 RX ADMIN — CARVEDILOL 12.5 MG: 12.5 TABLET, FILM COATED ORAL at 16:47

## 2021-03-10 RX ADMIN — LORAZEPAM 0.5 MG: 0.5 TABLET ORAL at 02:54

## 2021-03-10 RX ADMIN — POTASSIUM CHLORIDE 30 MEQ: 1.5 POWDER, FOR SOLUTION ORAL at 08:24

## 2021-03-10 RX ADMIN — FOLIC ACID 1 MG: 1 TABLET ORAL at 08:23

## 2021-03-10 RX ADMIN — LEVOTHYROXINE SODIUM 88 MCG: 88 TABLET ORAL at 05:42

## 2021-03-10 RX ADMIN — LANSOPRAZOLE 30 MG: KIT at 16:48

## 2021-03-10 RX ADMIN — DIAZEPAM 8 MG: 2 TABLET ORAL at 13:02

## 2021-03-10 RX ADMIN — HYDROCHLOROTHIAZIDE 12.5 MG: 12.5 CAPSULE ORAL at 08:23

## 2021-03-10 RX ADMIN — CARVEDILOL 12.5 MG: 12.5 TABLET, FILM COATED ORAL at 08:23

## 2021-03-10 RX ADMIN — HYDROXYZINE HYDROCHLORIDE 25 MG: 25 TABLET, FILM COATED ORAL at 10:22

## 2021-03-10 RX ADMIN — LANSOPRAZOLE 30 MG: KIT at 08:23

## 2021-03-10 RX ADMIN — ONDANSETRON 4 MG: 2 INJECTION INTRAMUSCULAR; INTRAVENOUS at 13:26

## 2021-03-10 RX ADMIN — SENNOSIDES 2 TABLET: 8.6 TABLET, FILM COATED ORAL at 08:23

## 2021-03-10 RX ADMIN — DIAZEPAM 5 MG: 5 TABLET ORAL at 05:42

## 2021-03-10 RX ADMIN — HYDROXYZINE HYDROCHLORIDE 25 MG: 25 TABLET, FILM COATED ORAL at 16:47

## 2021-03-10 RX ADMIN — DIAZEPAM 5 MG: 5 TABLET ORAL at 00:03

## 2021-03-10 RX ADMIN — THIAMINE HCL TAB 100 MG 100 MG: 100 TAB at 08:23

## 2021-03-10 RX ADMIN — LORAZEPAM 0.5 MG: 0.5 TABLET ORAL at 23:15

## 2021-03-10 RX ADMIN — Medication 10 MG: at 23:14

## 2021-03-10 RX ADMIN — Medication 1 TABLET: at 08:23

## 2021-03-10 NOTE — PROGRESS NOTES
[facility] Medicine Services  PROGRESS NOTE    Patient Name: Elvia Montero  : 1975  MRN: [id number]    Date of Admission: 2021  Primary Care Physician: Provider, No Known    Subjective   Subjective     CC:  Follow-up suicide attempt, anxiety    HPI:  Reports having a lot of anxiety last night.  It bothers her most before bedtime when she is trying to lay down and sleep.  Was given dose of Ativan in the middle the night she states helped but made her very sleepy.  Awaiting evaluation by the [facility] and reevaluation by SLP    ROS:  Gen- No fevers, chills  CV- No chest pain, palpitations  Resp- No cough, dyspnea  GI- No N/V/D, abd pain    Objective   Objective     Vital Signs:   Temp:  [97.5 °F (36.4 °C)-98.6 °F (37 °C)] 98.3 °F (36.8 °C)  Heart Rate:  [50-82] 50  Resp:  [16] 16  BP: (153-179)/() 153/94        Physical Exam:  Constitutional: No acute distress, awake, alert, sitting up on edge of bed  HENT: NCAT, mucous membranes moist  Respiratory: Clear to auscultation bilaterally, respiratory effort normal   Cardiovascular: RRR, no murmurs, rubs, or gallops, palpable radial pulses bilaterally  Gastrointestinal: Positive bowel sounds, soft, nontender, nondistended  Musculoskeletal: No bilateral ankle edema  Psychiatric: Appropriate affect, cooperative  Neurologic: Oriented x 3, strength symmetric in all extremities, speech clear    Results Reviewed:  Results from last 7 days   Lab Units 21  0659 21  0855 21  0602   WBC 10*3/mm3 8.08 9.23 8.05   HEMOGLOBIN g/dL 10.3* 10.4* 10.0*   HEMATOCRIT % 37.0 36.2 35.9   PLATELETS 10*3/mm3 778* 855* 634*     Results from last 7 days   Lab Units 21  0659 21  0855 21  0758   SODIUM mmol/L 138 139 137   POTASSIUM mmol/L 3.6 4.6 4.1   CHLORIDE mmol/L 102 103 101   CO2 mmol/L 22.0 26.0 24.0   BUN mg/dL 15 16 19   CREATININE mg/dL 0.58 0.58 0.56*   GLUCOSE mg/dL 170* 206* 202*   CALCIUM mg/dL 9.5 10.5 9.9    ALT (SGPT) U/L 52* 45*  --    AST (SGOT) U/L 31 30  --      Estimated Creatinine Clearance: 138.2 mL/min (by C-G formula based on SCr of 0.58 mg/dL).    Microbiology Results Abnormal     Procedure Component Value - Date/Time    Urine Culture - Urine, Urine, Clean Catch [[id number]]  (Normal) Collected: 02/26/21 1056    Lab Status: Final result Specimen: Urine, Clean Catch Updated: 02/27/21 1207     Urine Culture No growth    MRSA Screen, PCR (Inpatient) - Swab, Nares [[id number]]  (Normal) Collected: 02/24/21 1442    Lab Status: Final result Specimen: Swab from Nares Updated: 02/24/21 1908     MRSA PCR Negative    Narrative:      MRSA Negative    COVID PRE-OP / PRE-PROCEDURE SCREENING ORDER (NO ISOLATION) - Swab, Nasopharynx [[id number]]  (Normal) Collected: 02/21/21 2252    Lab Status: Final result Specimen: Swab from Nasopharynx Updated: 02/21/21 2324    Narrative:      The following orders were created for panel order COVID PRE-OP / PRE-PROCEDURE SCREENING ORDER (NO ISOLATION) - Swab, Nasopharynx.  Procedure                               Abnormality         Status                     ---------                               -----------         ------                     COVID-19 and FLU A/B PCR...[[id number]]  Normal              Final result                 Please view results for these tests on the individual orders.    COVID-19 and FLU A/B PCR - Swab, Nasopharynx [[id number]]  (Normal) Collected: 02/21/21 2252    Lab Status: Final result Specimen: Swab from Nasopharynx Updated: 02/21/21 2324     COVID19 Not Detected     Influenza A PCR Not Detected     Influenza B PCR Not Detected    Narrative:      Fact sheet for providers: https://www.fda.gov/media/447091/download    Fact sheet for patients: https://www.fda.gov/media/060057/download    Test performed by PCR.          Imaging Results (Last 24 Hours)     ** No results found for the last 24 hours. **              I have reviewed the medications:  Scheduled  Meds:carvedilol, 12.5 mg, Oral, BID With Meals  enoxaparin, 40 mg, Subcutaneous, Q24H  folic acid, 1 mg, Oral, Daily  hydroCHLOROthiazide, 12.5 mg, Oral, Daily  insulin lispro, 0-7 Units, Subcutaneous, 4x Daily With Meals & Nightly  lansoprazole, 30 mg, Nasogastric, BID AC  levothyroxine, 88 mcg, Oral, Q AM  melatonin, 10 mg, Oral, Nightly  multivitamin chewable, 1 tablet, Oral, Daily  PARoxetine, 20 mg, Oral, Daily  potassium chloride, 30 mEq, Oral, Daily  senna, 2 tablet, Oral, BID  sodium chloride, 10 mL, Intravenous, Q12H  thiamine, 100 mg, Oral, Daily      Continuous Infusions:   PRN Meds:.•  acetaminophen  •  diazePAM  •  hydrALAZINE  •  influenza vaccine  •  magnesium sulfate  •  ondansetron  •  [DISCONTINUED] potassium chloride **OR** potassium chloride **OR** potassium chloride  •  sodium chloride    Assessment/Plan   Assessment & Plan     Active Hospital Problems    Diagnosis  POA   • **Suicide attempt (CMS/[facility]) [T14.91XA]  Yes   • Bipolar 1 disorder (CMS/[facility]) [F31.9]  Yes   • GERD (gastroesophageal reflux disease) [K21.9]  Yes   • Pharyngeal dysphagia [R13.13]  Yes   • Probable Aspiration Pneumonia (CMS/[facility]) [J69.0]  Yes   • Hypothyroidism (acquired) [E03.9]  Yes   • 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        Brief Hospital Course to date:  Elvia Montero is a 45 y.o. female [place] with bipolar disorder/OCD/anxiety admitted 2/21/2021 following suicide attempt (1/2 cup Mr. John, bottle of wine, Klonopin, Seroquel) seen by GI with EGD 2/22/2021 with esophagitis and minimal injury; subsequently with alcohol withdrawal, treated with Zosyn for  aspiration pneumonia; she failed FEES 3/20/2021 with severe pharyngeal dysphagia due to edema/secretions and required NG tube with tube feeds, also complicated with hyponatremia per nephrology thought to be nephrogenic diabetes insipidus from lithium (post D5, HCTZ, DDAVP) transferred to the PCU 3/4/2021 and subsequently the hospitalist service on 3/7/2021, currently she is on a modified diet with nectar thick liquids and the [facility] where her psychiatrist works will not accept her until she is on a normal diet     Assessment/plan     Severe pharyngeal dysphagia  Esophagitis due to toxic injury  -Failed FEES 3/3/2021  -SLP following, now on modified diet with nectar thick liquids; cannot go to the [facility] until she is on thin liquids, planned reassessment with MBS/FEES tomorrow    Anxiety  -Lithium was stopped due to concerns for nephrogenic DI  -Continue Paxil  -Continue Valium, add Atarax; symptoms worsen bedtime, add as needed bedtime Ativan dose     Suicide attempt  -Ingested Mr. John, bottle of wine, Klonopin, Seroquel  -Has sitter, suicide precautions  -Agreeable to discharge to inpatient psych facility, has been at the [facility] previously, addiction team following     Thrombocythemia  -Suspecting acute phase reactant, stable/slightly trending down, intermittent monitoring; recommend outpatient recheck after psychiatric facility evaluation     S/p alcohol withdrawal  Alcohol abuse with dependence  -Benzodiazepine use for anxiety not withdrawal, at this time     S/p nephrogenic diabetes insipidus with hypernatremia  -Felt to be related to chronic lithium use  -S/p DDAVP  -Continue HCTZ, started by nephrology  -Nephrology signed off 3/6/2021     Iron deficiency anemia  -S/p IV iron per nephrology     Hypothyroidism  -Continue Synthroid    Impaired glucose tolerance  -A1c 6.1%     [employer], refuses blood products per prior documentation     DVT Prophylaxis: Lovenox    Disposition: Patient is medically ready  for discharge to the [facility], unfortunately they will not take her on nectar thick liquids, SLP following and hopeful for discharge to inpatient psychiatric facility once on a normal diet.    CODE STATUS:   Code Status and Medical Interventions:   Ordered at: 02/21/21 2046     Code Status:    CPR     Medical Interventions (Level of Support Prior to Arrest):    Full       Fred Langley, DO  03/10/21

## 2021-03-10 NOTE — PROGRESS NOTES
Clinical Nutrition     Nutrition Support Assessment  Reason for Visit: EN f/u      Patient Name: Elvia Montero  YOB: 1975  MRN: [id number]  Date of Encounter: 03/10/21 11:28 EST  Admission date: 2/21/2021         Nutrition Assessment   Assessment   Suicide Attempt s/p ingestion 1/2 cup Dora Estrella, bottle of wine, klonopin, seroquel  Caustic Esophagitis  Etoh w/d  Hypernatremia- resolved,  suspected due to diabetes insipidus    s/p EGD 2-22-21: inflammation of the uvula, grade c esophagitis, minimal injury, normal stomach, normal duodenum    3/8) EN d/c and tube pulled, started on diet per speech recommendations      PMH: She  has a past medical history of Anxiety, Bipolar 1 disorder (CMS/HCC), Depression, Disease of thyroid gland, Esophagitis, Gastroparesis, GERD (gastroesophageal reflux disease), and Obsessive compulsive disorder (2020).   PSxH: She  has a past surgical history that includes Neck surgery and Esophagogastroduodenoscopy (N/A, 2/22/2021).     Substance abuse: Etoh    Reported/Observed/Food/Nutrition Related History:     Patient reports that appetite has been good. Agreeable to ONS. Noted patients PO preferences. Patient with variable weights during admission, please see wt hx below. No edema currently documented per NSG documentation. 1x BM noted this am per I/O.    Anthropometrics     Height: 67in  Weight:194lb per bed wt on 3/5  BMI: 30.4  IBW:135lb    Weight Weight (kg) Weight (lbs) Weight Method   3/10/2021 90.084 kg 198 lb 9.6 oz -   3/9/2021 92.08 kg 203 lb Bed scale   3/8/2021 92.126 kg 203 lb 1.6 oz Bed scale   3/7/2021 93.033 kg 205 lb 1.6 oz Bed scale   3/6/2021 90.901 kg 200 lb 6.4 oz Bed scale   3/5/2021 88.406 kg 194 lb 14.4 oz Bed scale   3/4/2021 94.348 kg 208 lb -   3/4/2021 94.8 kg 208 lb 15.9 oz Bed scale   2/27/2021 99.2 kg 218 lb 11.1 oz Bed scale   2/26/2021 100.1 kg 220 lb 10.9 oz Bed scale   2/23/2021 97.9 kg 215 lb 13.3 oz Bed scale   2/22/2021  94.6 kg 208 lb 8.9 oz Stated   2/21/2021 81.647 kg 180 lb Stated         Labs reviewed     Results from last 7 days   Lab Units 03/08/21  0659 03/07/21  0855 03/06/21  0758   GLUCOSE mg/dL 170* 206* 202*   BUN mg/dL 15 16 19   CREATININE mg/dL 0.58 0.58 0.56*   SODIUM mmol/L 138 139 137   CHLORIDE mmol/L 102 103 101   POTASSIUM mmol/L 3.6 4.6 4.1   PHOSPHORUS mg/dL 3.6 3.3 3.4   MAGNESIUM mg/dL 2.1 2.2 2.2   ALT (SGPT) U/L 52* 45*  --      Results from last 7 days   Lab Units 03/08/21  0659 03/07/21  0855 03/06/21  0758   ALBUMIN g/dL 3.70 4.30 3.90   PREALBUMIN mg/dL 35.6  --   --    CRP mg/dL 1.97*  --   --    TRIGLYCERIDES mg/dL 192*  --   --        Results from last 7 days   Lab Units 03/10/21  0825 03/09/21 2011 03/09/21  1731 03/09/21  0753 03/08/21  2249 03/08/21  1647   GLUCOSE mg/dL 136* 106 177* 156* 137* 148*         Medications reviewed-yes   Hydrochlorothiazide, MVI, KLOR-CON, senokot, Thiamin     Current Nutrition Prescription     PO: Diet Dysphagia; III - Pureed With Some Mashed; Nectar / Syrup Thick; No Straws     Intake: 25% x 1 meal documented    Nutrition Diagnosis     2-23-21, 3-3  Problem Inadequate energy intake   Etiology Per Clinical Status   Signs/Symptoms EN     Status:resolved 3/5.     3/5, 3/8:  Problem--difficulty swallowing/needs alternate route  Etiology-dysphagia per SLP eval  S/S: NPO    Status: Resolved, Patient advanced to PO diet 3/8    3/10  Problem Inadequate oral intake   Etiology Decreased ability to consume sufficient energy   Signs/Symptoms 25% x 1 meal documented       Nutrition Intervention/ Rx:     Follow treatment progress, Care Plan reveiwed    - Continue diet per speech recommendations  - Boost BID w/ meals (chocolate)       Goal:   General: Nutrition support treatment  PO: Increased PO intake    Monitoring/Evaluation:   Per protocol    Althea Fleming RD  Time Spent: 30min

## 2021-03-10 NOTE — PLAN OF CARE
"Goal Outcome Evaluation:         Patient has remained lucid all night. Sitter at bedside. Patient walked in the hallway with sitter. PRN Valium given at midnight and at 0245 patient stated she was feeling \"panicky\". PA phoned and one-time Ativan order obtained and administered. VSS. No s/s withdrawal  "

## 2021-03-10 NOTE — THERAPY TREATMENT NOTE
Acute Care - Speech Language Pathology   Swallow Treatment Note [facility]     Patient Name: Elvia Montero  : 1975  MRN: [id number]  Today's Date: 3/10/2021               Admit Date: 2021    Visit Dx:     ICD-10-CM ICD-9-CM   1. Suicide attempt (CMS/[facility])  T14.91XA E958.9   2. Toxic effect of caustic substance, intentional self-harm, initial encounter (CMS/[facility])  T54.92XA 983.9     E950.7   3. Esophagitis  K20.90 530.10   4. Alcoholic intoxication without complication (CMS/[facility])  F10.920 305.00   5. 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[facility])   • Bipolar 1 disorder (CMS/[facility])   • Obsessive compulsive disorder   • GERD (gastroesophageal reflux disease)   • Pharyngeal dysphagia   • Probable Aspiration Pneumonia (CMS/[facility])   • Hypothyroidism (acquired)     Past Medical History:   Diagnosis Date   • Anxiety    • Bipolar 1 disorder (CMS/[facility])    • Depression    • Disease of thyroid gland    • Esophagitis    • Gastroparesis    • GERD (gastroesophageal reflux disease)    • Obsessive compulsive disorder      Past Surgical History:   Procedure Laterality Date   • ENDOSCOPY N/A 2021    Procedure: ESOPHAGOGASTRODUODENOSCOPY;  Surgeon: Chaparro Chester MD;  Location: [facility];  Service: Gastroenterology;  Laterality: N/A;   • NECK SURGERY          SWALLOW EVALUATION (last 72 hours)      SLP Adult Swallow Evaluation     Row Name 03/10/21 1015 21 1500 21 1410             Rehab Evaluation    Document Type  therapy note (daily note)  -VO  therapy note (daily note)  -RD (r) MT (t) RD (c)  re-evaluation;other (see comments) FEES  -RD      Subjective Information  no complaints  -VO   no complaints  -RD (r) MT (t) RD (c)  no complaints  -RD      Patient Observations  alert;cooperative  -VO  alert;cooperative;agree to therapy  -RD (r) MT (t) RD (c)  alert;cooperative;agree to therapy  -RD      Patient/Family/Caregiver Comments/Observations  sitter present  -VO  Sitter present   -RD (r) MT (t) RD (c)  Sitter present  -RD      Care Plan Review  care plan/treatment goals reviewed;risks/benefits reviewed  -VO  evaluation/treatment results reviewed;care plan/treatment goals reviewed;risks/benefits reviewed;current/potential barriers reviewed;patient/other agree to care plan  -RD (r) MT (t) RD (c)  --      Patient Effort  excellent  -VO  good  -RD (r) MT (t) RD (c)  good  -RD      Symptoms Noted During/After Treatment  --  none  -RD (r) MT (t) RD (c)  --         General Information    Patient Profile Reviewed  --  --  yes  -RD      Pertinent History Of Current Problem  --  --  Adm w/ alcohol withdrawal, attempted suicide, toxic effect of caustic substance, esophageal inflammation, does not accept blood products 2' Jehovah's wittness. Hospitalist team called about swallowing re-evaluation. Silent aspiration and edema on prior FEES. Repeat FEES evaluation today to see if any improvement.   -RD      Current Method of Nutrition  --  --  NPO;nasogastric feedings  -RD      Precautions/Limitations, Vision  --  --  WFL;for purposes of eval  -RD      Precautions/Limitations, Hearing  --  --  WFL;for purposes of eval  -RD      Prior Level of Function-Communication  --  --  unknown  -RD      Prior Level of Function-Swallowing  --  --  safe, efficient swallowing in all situations  -RD      Plans/Goals Discussed with  --  --  patient;agreed upon  -RD      Barriers to Rehab  --  --  medically complex  -RD      Patient's Goals for Discharge  --  --  eat/drink without coughing/choking;return to regular diet  -RD         Pain    Additional Documentation  --  Pain Scale: Numbers Pre/Post-Treatment (Group)  -RD (r) MT (t)  RD (c)  Pain Scale: Numbers Pre/Post-Treatment (Group)  -RD         Pain Scale: Numbers Pre/Post-Treatment    Pretreatment Pain Rating  0/10 - no pain  -VO  0/10 - no pain  -RD (r) MT (t) RD (c)  0/10 - no pain  -RD      Posttreatment Pain Rating  0/10 - no pain  -VO  0/10 - no pain  -RD (r) MT (t) RD (c)  0/10 - no pain  -RD         Fiberoptic Endoscopic Evaluation of Swallowing (FEES)    Risks/Benefits Reviewed  --  --  risks/benefits explained;patient;agreed to eval  -RD      Nasal Entry  --  --  right:  -RD      Scope serial number/identification  --  --  338  -RD         Anatomy and Physiology    Anatomic Considerations  --  --  edema;erythema;arytenoids  -RD      Comment  --  --  Edma improved. Vocal cords were visible during this exam and appeared WNL. Arytenoid edema was still impacting swallow.   -RD      Velopharyngeal  --  --  WFL  -RD      Base of Tongue  --  --  symmetrical;range reduced  -RD      Epiglottis  --  --  WFL  -RD      Laryngeal Function Breathing  --  --  symmetrical  -RD      Laryngeal Function Phonation  --  --  symmetrical  -RD      Laryngeal Function to Breath Hold  --  --  TVF contact  -RD      Secretion Rating Scale (Shailesh et al. 1996)  --  --  0- normal, no visible secretions  -RD      Secretion Description  --  --  thin;clear  -RD      Ice Chips  --  --  DNA  -RD      Spontaneous Swallow  --  --  frequency reduced  -RD      Sensory  --  --  reduced sensation  -RD      Utensils Used  --  --  Spoon;Cup;Straw  -RD      Consistencies Trialed  --  --  thin liquids;nectar-thick liquids;pudding/puree;regular textures;spoon;cup;straw  -RD         FEES Interpretation    Oral Phase  --  --  prolonged manipulation;reduced lingual control;with solids only  -RD      Oral Phase, Comment  --  --  Mild oral dysphagia  -RD         Initiation of Pharyngeal Swallow    Initiation of Pharyngeal Swallow  --  --  bolus in pyriform sinuses  -RD      Pharyngeal Phase  --  --  impaired pharyngeal phase of  swallowing  -RD      Penetration Before the Swallow  --  --  thin liquids;nectar-thick liquids;secondary to delayed swallow initiation or mistiming;other (see comments) nectar-thick via straw  -RD      Aspiration During the Swallow  --  --  thin liquids;nectar-thick liquids;secondary to delayed swallow initiation or mistiming;secondary to reduced vestibular closure;other (see comments) nectar-thick via straw  -RD      Penetration After the Swallow  --  --  thin liquids;secondary to residue;in pyriform sinuses  -RD      Response to Penetration  --  --  no response  -RD      Response to Aspiration  --  --  no response, silent aspiration  -RD      Rosenbek's Scale  --  --  thin:;nectar:;8-->Level 8;pudding/puree:;1-->Level 1  -RD      Residue  --  --  all consistencies tested;base of tongue;valleculae;pyriform sinuses;secondary to reduced base of tongue retraction;secondary to reduced hyolaryngeal excursion;other (see comments) mild w/ all; moderate-severe w/ solid; edema impacting  -RD      Response to Residue  --  --  with spontaneous subsequent swallow;with cued swallow;other (see comments) reduced; couldn't clear solids  -RD      Attempted Compensatory Maneuvers  --  --  bolus size;bolus presentation style;additional subsequent swallow;multiple swallows;throat clear after swallow;combination of maneuvers (see comments)  -RD      Response to Attempted Compensatory Maneuvers  --  --  prevented aspiration;reduced residue  -RD      Pharyngeal Phase, Comment  --  --  Mild-moderate pharyngeal dysphagia. Penetration before w/ aspiration during the swallow w/ thins & nectar-thick liquid via straw 2' delayed initiation and reduced vestibular closure. Penetration/aspiration after the swallow w/ thins as well 2' pyriform sinus residue (edema of arytenoids contributing to residue). Aspiration was silent, pt could not clear aspiration, even w/ cued cough. Penetration after the swallow w/ solids 2' diffuse residue coating  keofeed. Pt having difficulty clearing solids thru pharynx 2' arytenoid edema. No penetration/aspiration w/ small sips of nectar-thick liquids via tsp/cup or pudding consistencies. Mild BOT/vallecular and pyriform sinus residue w/ all consistencies (moderate-severe w/ solids) 2' reduced BOT retraction and decr'd excursion (+ edema impacting). Pt able to reduce residue for liquids/pudding w/ cued multiple swallows and sometimes spontaneous secondary swallow. Extensive education provided to patient on dysphagia, modified diet and goals during treatment. Discussed results w/ APRN on hospitalist team and RN. Will continue pharyngeal strengthening during treatment.  -RD         Clinical Impression    Daily Summary of Progress (SLP)  progress toward functional goals is good  -VO  progress toward functional goals is good  -RD (r) MT (t) RD (c)  --      SLP Swallowing Diagnosis  --  --  mild;oral dysphagia;mild-moderate;pharyngeal dysphagia  -RD      Functional Impact  --  --  risk of aspiration/pneumonia  -RD      Rehab Potential/Prognosis, Swallowing  --  --  good, to achieve stated therapy goals  -RD      Swallow Criteria for Skilled Therapeutic Interventions Met  --  --  demonstrates skilled criteria  -RD      Plan for Continued Treatment (SLP)  Pt able to independently demonstrate majority of dysphagia exercises and complete several repetitions. Mild cues and demonstration required for eron. No overt s/sxs aspiration w/ thins will plan for repeat instrumental tomorrow for swallow function progress via MBS.  -VO  Reviewed swallowing exercises, handout left at bedside. Good participation. Tolerating current diet. Delayed cough noted following PO trials however pt and NA report this has been happening when she talks due to sore throat from keofeed being pulled. Will continue to monitor diet tolerance closely. Pt has plans to d/c to [facility] if able to upgrade to regular diet. Plan for instrumental on Thursday to  reasses dysphagia.   -RD (r) MT (t) KWADWO (c)  --         Recommendations    Therapy Frequency (Swallow)  5 days per week  -VO  5 days per week  -KWADWO (r) MT (t) KWADWO (c)  5 days per week  -RD      Predicted Duration Therapy Intervention (Days)  until discharge  -VO  until discharge  -KWADWO (r) MT (t) KWADWO (c)  until discharge  -RD      SLP Diet Recommendation  puree with some mashed;nectar thick liquids;ice chips between meals after oral care, with supervision  -VO  puree with some mashed;nectar thick liquids;ice chips between meals after oral care, with supervision No straw   -KWADWO (r) MT (t) RD (c)  puree with some mashed;nectar thick liquids;ice chips between meals after oral care, with supervision  -RD      Recommended Diagnostics  reassess via [facility] ([facility])  -VO  --  FEES;other (see comments) completed 3/8/2021  -RD      Recommended Precautions and Strategies  upright posture during/after eating;small bites of food and sips of liquid;no straw;multiple swallows per bite of food;multiple swallows per sip of liquid;general aspiration precautions  -VO  upright posture during/after eating;small bites of food and sips of liquid;no straw;multiple swallows per bite of food;multiple swallows per sip of liquid;general aspiration precautions  -KWADWO (r) MT (t) KWADWO (c)  upright posture during/after eating;small bites of food and sips of liquid;no straw;multiple swallows per bite of food;multiple swallows per sip of liquid;general aspiration precautions  -RD      Oral Care Recommendations  Oral Care BID/PRN  -VO  Oral Care BID/PRN  -KWADWO (r) MT (t) RD (c)  Oral Care BID/PRN  -RD      SLP Rec. for Method of Medication Administration  meds crushed;with pudding or applesauce;as tolerated  -VO  meds crushed;with pudding or applesauce;as tolerated  -KWADWO (r) MT (t) RD (c)  meds crushed;with pudding or applesauce;as tolerated  -RD      Monitor for Signs of Aspiration  yes;notify SLP if any concerns  -VO  yes;notify SLP if any concerns  -KWADWO (pola) MT (t) KWADWO  (c)  yes;notify SLP if any concerns  -RD      Anticipated Discharge Disposition (SLP)  unknown;anticipate therapy at next level of care  -VO  unknown;anticipate therapy at next level of care  -RD (r) MT (t) RD (c)  unknown;anticipate therapy at next level of care  -RD        User Key  (r) = Recorded By, (t) = Taken By, (c) = Cosigned By    Initials Name Effective Dates    RD Sugar Sherry ANGELES, MS CCC-SLP 04/03/18 -     Bettye Orozco MA,CCC-SLP 08/09/20 -     Isela Nair, Speech Therapy Student 01/14/21 -           EDUCATION  The patient has been educated in the following areas:   Dysphagia (Swallowing Impairment) Oral Care/Hydration Modified Diet Instruction.    SLP Recommendation and Plan     SLP Diet Recommendation: puree with some mashed, nectar thick liquids, ice chips between meals after oral care, with supervision  Recommended Precautions and Strategies: upright posture during/after eating, small bites of food and sips of liquid, no straw, multiple swallows per bite of food, multiple swallows per sip of liquid, general aspiration precautions  SLP Rec. for Method of Medication Administration: meds crushed, with pudding or applesauce, as tolerated     Monitor for Signs of Aspiration: yes, notify SLP if any concerns  Recommended Diagnostics: reassess via VFSS (MBS)     Anticipated Discharge Disposition (SLP): unknown, anticipate therapy at next level of care     Therapy Frequency (Swallow): 5 days per week  Predicted Duration Therapy Intervention (Days): until discharge     Daily Summary of Progress (SLP): progress toward functional goals is good    Plan for Continued Treatment (SLP): Pt able to independently demonstrate majority of dysphagia exercises and complete several repetitions. Mild cues and demonstration required for eron. No overt s/sxs aspiration w/ thins will plan for repeat instrumental tomorrow for swallow function progress via MBS.              Plan of Care Reviewed With:  patient    SLP GOALS     Row Name 03/10/21 1015 03/09/21 1500 03/08/21 1410       Oral Nutrition/Hydration Goal 1 (SLP)    Oral Nutrition/Hydration Goal 1, SLP  LTG: Pt will return to regular diet & thin liquids w/ no overt s/s of aspiration w/ 100% accuracy w/o cues  -VO  LTG: Pt will return to regular diet & thin liquids w/ no overt s/s of aspiration w/ 100% accuracy w/o cues  -RD (r) MT (t) RD (c)  LTG: Pt will return to regular diet & thin liquids w/ no overt s/s of aspiration w/ 100% accuracy w/o cues  -RD    Time Frame (Oral Nutrition/Hydration Goal 1, SLP)  by discharge  -VO  by discharge  -RD (r) MT (t) RD (c)  by discharge  -RD    Progress/Outcomes (Oral Nutrition/Hydration Goal 1, SLP)  continuing progress toward goal  -VO  continuing progress toward goal  -RD (r) MT (t) RD (c)  goal revised this date;continuing progress toward goal  -RD       Oral Nutrition/Hydration Goal 2 (SLP)    Oral Nutrition/Hydration Goal 2, SLP  Pt will tolerate nectar-thick liquids and Level 3-pureed w/ some mashed diet w/o s/s of aspiration w/ 100% accuracy w/o cues  -VO  Pt will tolerate nectar-thick liquids and Level 3-pureed w/ some mashed diet w/o s/s of aspiration w/ 100% accuracy w/o cues  -RD (r) MT (t) RD (c)  Pt will tolerate nectar-thick liquids and Level 3-pureed w/ some mashed diet w/o s/s of aspiration w/ 100% accuracy w/o cues  -RD    Time Frame (Oral Nutrition/Hydration Goal 2, SLP)  short term goal (STG)  -VO  short term goal (STG)  -RD (r) MT (t) RD (c)  short term goal (STG)  -RD    Barriers (Oral Nutrition/Hydration Goal 2, SLP)  --  Delayed cough following PO trials, NA and Pt state this has been happening when patient talks, suspect not related to swallow.   -RD (r) MT (t) RD (c)  Pt showing inconsistent TC and/or hard immediate cough with ice trials. May be indicating improved pharyngeal sensation.  -RD    Progress/Outcomes (Oral Nutrition/Hydration Goal 2, SLP)  continuing progress toward goal  -VO   continuing progress toward goal  -RD (r) MT (t) RD (c)  goal revised this date  -RD       Lingual Strengthening Goal 1 (SLP)    Activity (Lingual Strengthening Goal 1, SLP)  increase lingual tone/sensation/control/coordination/movement;increase tongue back strength  -VO  increase lingual tone/sensation/control/coordination/movement;increase tongue back strength  -RD (r) MT (t) RD (c)  increase lingual tone/sensation/control/coordination/movement;increase tongue back strength  -RD    Increase Lingual Tone/Sensation/Control/Coordination/Movement  lingual resistance exercises  -VO  lingual resistance exercises  -RD (r) MT (t) RD (c)  lingual resistance exercises  -RD    Increase Tongue Back Strength  swallow trials;lingual resistance exercises  -VO  swallow trials;lingual resistance exercises  -RD (r) MT (t) RD (c)  --    [place]/Accuracy (Lingual Strengthening Goal 1, SLP)  with minimal cues (75-90% accuracy)  -VO  with minimal cues (75-90% accuracy)  -RD (r) MT (t) RD (c)  with minimal cues (75-90% accuracy)  -RD    Time Frame (Lingual Strengthening Goal 1, SLP)  short term goal (STG)  -VO  short term goal (STG)  -RD (r) MT (t) RD (c)  short term goal (STG)  -RD    Progress/Outcomes (Lingual Strengthening Goal 1, SLP)  continuing progress toward goal  -VO  continuing progress toward goal  -RD (r) MT (t) RD (c)  goal revised this date;continuing progress toward goal  -RD       Pharyngeal Strengthening Exercise Goal 1 (SLP)    Activity (Pharyngeal Strengthening Goal 1, SLP)  increase timing;increase anterior movement of the hyolaryngeal complex;increase closure at entrance to airway/closure of airway at glottis;increase tongue base retraction  -VO  increase timing;increase anterior movement of the hyolaryngeal complex;increase closure at entrance to airway/closure of airway at glottis;increase tongue base retraction  -RD (r) MT (t) RD (c)  increase timing;increase anterior movement of the hyolaryngeal  complex;increase closure at entrance to airway/closure of airway at glottis;increase tongue base retraction  -RD    Increase Timing  prepping - 3 second prep or suck swallow or 3-step swallow  -VO  prepping - 3 second prep or suck swallow or 3-step swallow  -RD (r) MT (t) RD (c)  prepping - 3 second prep or suck swallow or 3-step swallow  -RD    Increase Anterior Movement of the Hyolaryngeal Complex  chin tuck against resistance (CTAR)  -VO  chin tuck against resistance (CTAR)  -RD (r) MT (t) RD (c)  chin tuck against resistance (CTAR)  -RD    Increase Closure at Entrance to Airway/Closure of Airway at Glottis  supraglottic swallow  -VO  supraglottic swallow  -RD (r) MT (t) RD (c)  supraglottic swallow  -RD    Increase Squeeze/Positive Pressure Generation  effortful pitch glide (falsetto + pharyngeal squeeze)  -VO  effortful pitch glide (falsetto + pharyngeal squeeze)  -RD (r) MT (t) RD (c)  effortful pitch glide (falsetto + pharyngeal squeeze)  -RD    Increase Tongue Base Retraction  hard effortful swallow;eron  -VO  hard effortful swallow;eron  -RD (r) MT (t) RD (c)  hard effortful swallow;eron  -RD    [place]/Accuracy (Pharyngeal Strengthening Goal 1, SLP)  with minimal cues (75-90% accuracy)  -VO  with minimal cues (75-90% accuracy)  -RD (r) MT (t) RD (c)  with minimal cues (75-90% accuracy)  -RD    Time Frame (Pharyngeal Strengthening Goal 1, SLP)  short term goal (STG)  -VO  short term goal (STG)  -RD (r) MT (t) RD (c)  short term goal (STG)  -RD    Barriers (Pharyngeal Strengthening Goal 1, SLP)  able to independently demonstrate most, mild cues needed for eron  -VO  Completed all x5, good participation, delayed initiation of swallow w/ three second prep and supraglottic.   -RD (r) MT (t) RD (c)  --    Progress/Outcomes (Pharyngeal Strengthening Goal 1, SLP)  continuing progress toward goal  -VO  continuing progress toward goal  -RD (r) MT (t) RD (c)  goal revised this date;continuing progress  toward goal  -RD       Swallow Compensatory Strategies Goal 1 (SLP)    Activity (Swallow Compensatory Strategies/Techniques Goal 1, SLP)  compensatory strategies;postural techniques;small bites;small cup sips;extra swallow per bolus  -VO  compensatory strategies;postural techniques;small bites;small cup sips;extra swallow per bolus  -RD (r) MT (t) RD (c)  compensatory strategies;postural techniques;small bites;small cup sips;extra swallow per bolus  -RD    [place]/Accuracy (Swallow Compensatory Strategies/Techniques Goal 1, SLP)  independently (over 90% accuracy)  -VO  independently (over 90% accuracy)  -RD (r) MT (t) RD (c)  independently (over 90% accuracy)  -RD    Time Frame (Swallow Compensatory Strategies/Techniques Goal 1, SLP)  short term goal (STG)  -VO  short term goal (STG)  -RD (r) MT (t) RD (c)  short term goal (STG)  -RD    Barriers (Swallow Compensatory Strategies/Techniques Goal 1, SLP)  --  Pt reports use of strategies during meals and demonstrated independent use during PO trials.   -RD (r) MT (t) RD (c)  --    Progress/Outcomes (Swallow Compensatory Strategies/Techniques Goal 1, SLP)  continuing progress toward goal  -VO  continuing progress toward goal  -RD (r) MT (t) RD (c)  --      User Key  (r) = Recorded By, (t) = Taken By, (c) = Cosigned By    Initials Name Provider Type    Sherry Chávez MS CCC-SLP Speech and Language Pathologist    Bettye Orozco MA,CCC-SLP Speech and Language Pathologist    Isela Nair, Speech Therapy Student Speech Therapy Student             Time Calculation:   Time Calculation- SLP     Row Name 03/10/21 1128             Time Calculation- SLP    SLP Start Time  1015  -VO      SLP Received On  03/10/21  -VO        User Key  (r) = Recorded By, (t) = Taken By, (c) = Cosigned By    Initials Name Provider Type    Bettye Orozco MA,CCC-SLP Speech and Language Pathologist          Therapy Charges for Today     Code Description Service Date  Service Provider Modifiers Qty    [id number] HC ST TREATMENT SWALLOW 3 3/10/2021 Bettye Greene MA,CCC-SLP GN 1               Bettye Greene MA,MARE-SLP  3/10/2021

## 2021-03-10 NOTE — PLAN OF CARE
"  Problem: Adult Inpatient Plan of Care  Goal: Plan of Care Review  Outcome: Ongoing, Progressing   Goal Outcome Evaluation:  Patient has been anxious this shift with added atarax and adjustment of valium she has had improvement.  Seems to do better with something to do or going on frequent walks so that she can't, \"sit with her thoughts\".  Talked to family over the phone and says she feels like she's doing a lot better and that she is ready [facility] that [facility] can be there for her kids.  Afebrile, NSR on tele, room air.    "

## 2021-03-10 NOTE — PLAN OF CARE
Goal Outcome Evaluation:  Plan of Care Reviewed With: patient      SLP treatment completed. Will continue to address dysphagia, plan for MBS tomorrow for swallow function progress. Please see note for further details and recommendations.

## 2021-03-10 NOTE — PROGRESS NOTES
Continued Stay Note  [facility]     Patient Name: Elvia Montero  MRN: [id number]  Today's Date: 3/10/2021    Admit Date: 2/21/2021    Discharge Plan     Row Name 03/10/21 0912       Plan    Plan  Social work met with the patient today and brought some clothing for the patient and social work is continuing to follow the patient and the patient will have a [facility] telephone assessment on Thursday 3/11/2021.  The patient is not able to go to [facility] if she has a modified diet. Her psychiatrist is at the [facility] and she wants to be able to go to the [facility] if that is possible.         Discharge Codes    No documentation.       Expected Discharge Date and Time     Expected Discharge Date Expected Discharge Time    Mar 6, 2021             ZARIA Morales

## 2021-03-11 ENCOUNTER — APPOINTMENT (OUTPATIENT)
Dept: GENERAL RADIOLOGY | Facility: HOSPITAL | Age: 46
End: 2021-03-11

## 2021-03-11 VITALS
OXYGEN SATURATION: 99 % | HEIGHT: 67 IN | BODY MASS INDEX: 31.34 KG/M2 | RESPIRATION RATE: 18 BRPM | WEIGHT: 199.7 LBS | SYSTOLIC BLOOD PRESSURE: 158 MMHG | TEMPERATURE: 98.8 F | DIASTOLIC BLOOD PRESSURE: 93 MMHG | HEART RATE: 77 BPM

## 2021-03-11 LAB
ANION GAP SERPL CALCULATED.3IONS-SCNC: 12 MMOL/L (ref 5–15)
BUN SERPL-MCNC: 11 MG/DL (ref 6–20)
BUN/CREAT SERPL: 16.4 (ref 7–25)
CALCIUM SPEC-SCNC: 10.4 MG/DL (ref 8.6–10.5)
CHLORIDE SERPL-SCNC: 99 MMOL/L (ref 98–107)
CO2 SERPL-SCNC: 25 MMOL/L (ref 22–29)
CREAT SERPL-MCNC: 0.67 MG/DL (ref 0.57–1)
DEPRECATED RDW RBC AUTO: 69.3 FL (ref 37–54)
ERYTHROCYTE [DISTWIDTH] IN BLOOD BY AUTOMATED COUNT: 22.6 % (ref 12.3–15.4)
GFR SERPL CREATININE-BSD FRML MDRD: 94 ML/MIN/1.73
GLUCOSE SERPL-MCNC: 133 MG/DL (ref 65–99)
HCT VFR BLD AUTO: 38.8 % (ref 34–46.6)
HGB BLD-MCNC: 11.2 G/DL (ref 12–15.9)
MAGNESIUM SERPL-MCNC: 2.2 MG/DL (ref 1.6–2.6)
MCH RBC QN AUTO: 25 PG (ref 26.6–33)
MCHC RBC AUTO-ENTMCNC: 28.9 G/DL (ref 31.5–35.7)
MCV RBC AUTO: 86.6 FL (ref 79–97)
PLATELET # BLD AUTO: 755 10*3/MM3 (ref 140–450)
PMV BLD AUTO: 9.8 FL (ref 6–12)
POTASSIUM SERPL-SCNC: 4.1 MMOL/L (ref 3.5–5.2)
RBC # BLD AUTO: 4.48 10*6/MM3 (ref 3.77–5.28)
SODIUM SERPL-SCNC: 136 MMOL/L (ref 136–145)
WBC # BLD AUTO: 5.4 10*3/MM3 (ref 3.4–10.8)

## 2021-03-11 PROCEDURE — 25010000002 ENOXAPARIN PER 10 MG: Performed by: NURSE PRACTITIONER

## 2021-03-11 PROCEDURE — 80048 BASIC METABOLIC PNL TOTAL CA: CPT | Performed by: INTERNAL MEDICINE

## 2021-03-11 PROCEDURE — 85027 COMPLETE CBC AUTOMATED: CPT | Performed by: INTERNAL MEDICINE

## 2021-03-11 PROCEDURE — 92611 MOTION FLUOROSCOPY/SWALLOW: CPT

## 2021-03-11 PROCEDURE — 83735 ASSAY OF MAGNESIUM: CPT | Performed by: INTERNAL MEDICINE

## 2021-03-11 PROCEDURE — 99239 HOSP IP/OBS DSCHRG MGMT >30: CPT | Performed by: NURSE PRACTITIONER

## 2021-03-11 PROCEDURE — 74230 X-RAY XM SWLNG FUNCJ C+: CPT

## 2021-03-11 RX ORDER — CARVEDILOL 12.5 MG/1
12.5 TABLET ORAL 2 TIMES DAILY WITH MEALS
Qty: 60 TABLET | Refills: 1 | Status: ON HOLD | OUTPATIENT
Start: 2021-03-11 | End: 2022-05-14

## 2021-03-11 RX ORDER — FOLIC ACID 1 MG/1
1 TABLET ORAL DAILY
Qty: 30 TABLET | Refills: 1 | Status: ON HOLD | OUTPATIENT
Start: 2021-03-12 | End: 2022-05-14

## 2021-03-11 RX ORDER — PANTOPRAZOLE SODIUM 40 MG/1
40 TABLET, DELAYED RELEASE ORAL 2 TIMES DAILY
Qty: 60 TABLET | Refills: 1 | Status: SHIPPED | OUTPATIENT
Start: 2021-03-11

## 2021-03-11 RX ORDER — HYDROCHLOROTHIAZIDE 12.5 MG/1
12.5 CAPSULE, GELATIN COATED ORAL DAILY
Qty: 30 CAPSULE | Refills: 1 | Status: ON HOLD | OUTPATIENT
Start: 2021-03-12 | End: 2022-05-14

## 2021-03-11 RX ORDER — DIAZEPAM 2 MG/1
6 TABLET ORAL EVERY 6 HOURS PRN
Status: ON HOLD
Start: 2021-03-11 | End: 2022-05-14

## 2021-03-11 RX ORDER — LORAZEPAM 0.5 MG/1
0.5 TABLET ORAL NIGHTLY PRN
Status: ON HOLD
Start: 2021-03-11 | End: 2022-05-14

## 2021-03-11 RX ORDER — ACETAMINOPHEN 160 MG/5ML
650 SOLUTION ORAL EVERY 6 HOURS PRN
Start: 2021-03-11

## 2021-03-11 RX ORDER — POTASSIUM CHLORIDE 1.5 G/1.77G
30 POWDER, FOR SOLUTION ORAL DAILY
Qty: 30 PACKET | Refills: 0 | Status: SHIPPED | OUTPATIENT
Start: 2021-03-12 | End: 2022-05-28 | Stop reason: HOSPADM

## 2021-03-11 RX ORDER — HYDROXYZINE HYDROCHLORIDE 25 MG/1
25 TABLET, FILM COATED ORAL 3 TIMES DAILY PRN
Start: 2021-03-11

## 2021-03-11 RX ORDER — CHOLECALCIFEROL (VITAMIN D3) 125 MCG
10 CAPSULE ORAL NIGHTLY
Status: ON HOLD
Start: 2021-03-11 | End: 2022-05-14

## 2021-03-11 RX ORDER — SENNA PLUS 8.6 MG/1
2 TABLET ORAL 2 TIMES DAILY
Start: 2021-03-11

## 2021-03-11 RX ORDER — LORAZEPAM 0.5 MG/1
0.5 TABLET ORAL ONCE
Status: DISCONTINUED | OUTPATIENT
Start: 2021-03-11 | End: 2021-03-11 | Stop reason: HOSPADM

## 2021-03-11 RX ADMIN — HYDROXYZINE HYDROCHLORIDE 25 MG: 25 TABLET, FILM COATED ORAL at 05:32

## 2021-03-11 RX ADMIN — DIAZEPAM 6 MG: 2 TABLET ORAL at 06:14

## 2021-03-11 RX ADMIN — THIAMINE HCL TAB 100 MG 100 MG: 100 TAB at 08:05

## 2021-03-11 RX ADMIN — DIAZEPAM 6 MG: 2 TABLET ORAL at 12:17

## 2021-03-11 RX ADMIN — ENOXAPARIN SODIUM 40 MG: 40 INJECTION SUBCUTANEOUS at 08:06

## 2021-03-11 RX ADMIN — HYDROCHLOROTHIAZIDE 12.5 MG: 12.5 CAPSULE ORAL at 08:05

## 2021-03-11 RX ADMIN — BARIUM SULFATE 100 ML: 0.81 POWDER, FOR SUSPENSION ORAL at 10:00

## 2021-03-11 RX ADMIN — Medication 1 TABLET: at 08:05

## 2021-03-11 RX ADMIN — LEVOTHYROXINE SODIUM 88 MCG: 88 TABLET ORAL at 05:45

## 2021-03-11 RX ADMIN — BARIUM SULFATE 20 ML: 400 PASTE ORAL at 10:00

## 2021-03-11 RX ADMIN — SODIUM CHLORIDE, PRESERVATIVE FREE 10 ML: 5 INJECTION INTRAVENOUS at 08:06

## 2021-03-11 RX ADMIN — SENNOSIDES 2 TABLET: 8.6 TABLET, FILM COATED ORAL at 08:05

## 2021-03-11 RX ADMIN — POTASSIUM CHLORIDE 30 MEQ: 1.5 POWDER, FOR SOLUTION ORAL at 08:11

## 2021-03-11 RX ADMIN — FOLIC ACID 1 MG: 1 TABLET ORAL at 08:06

## 2021-03-11 RX ADMIN — LANSOPRAZOLE 30 MG: KIT at 08:04

## 2021-03-11 RX ADMIN — HYDROXYZINE HYDROCHLORIDE 25 MG: 25 TABLET, FILM COATED ORAL at 13:38

## 2021-03-11 RX ADMIN — PAROXETINE HYDROCHLORIDE 20 MG: 20 TABLET, FILM COATED ORAL at 08:05

## 2021-03-11 RX ADMIN — CARVEDILOL 12.5 MG: 12.5 TABLET, FILM COATED ORAL at 08:06

## 2021-03-11 NOTE — PLAN OF CARE
Problem: Adult Inpatient Plan of Care  Goal: Plan of Care Review  3/11/2021 1010 by Mar Guidry, MS CCC-SLP  Outcome: Ongoing, Progressing  SLP re-evaluation completed. Will sign-off as MBS revealed functional oropharyngeal swallow. Please see note for further details and recommendations.

## 2021-03-11 NOTE — PROGRESS NOTES
Continued Stay Note  [facility]     Patient Name: Elvia Montero  MRN: [id number]  Today's Date: 3/11/2021    Admit Date: 2/21/2021    Discharge Plan     Row Name 03/11/21 0948       Plan    Plan  Social work called [facility] and they do not have any beds currently on the unit.  Social work call [facility] and they are checking to see if they can accept a patient on a thickened diet. [facility] in [place] said they can not accept a modied diet. [facility] does not have any beds.    Row Name 03/11/21 0913       Plan    Plan  [facility] Admissions Director  will not accept the patient until she passes her swallow study and social work will fax the results to the [facility]. The [facility] will do a telephone assessment today if the patient passes her swallow study.    Row Name 03/11/21 0854       Plan    Plan  Social work was able to get an ambulance for 4:45 pm today to the [facility] as long as the [facility] will accept the patient for inpatient treatment. [facility] has the medical records that were sent to them this morning.    Row Name 03/11/21 0814       Plan    Plan  Social work called [facility] at [phone] option 2  and faxed medical records to [facility] to review today.        Discharge Codes    No documentation.       Expected Discharge Date and Time     Expected Discharge Date Expected Discharge Time    Mar 6, 2021             ZARIA Morales

## 2021-03-11 NOTE — PROGRESS NOTES
Continued Stay Note  [facility]     Patient Name: Elvia Montero  MRN: [id number]  Today's Date: 3/11/2021    Admit Date: 2/21/2021    Discharge Plan     Row Name 03/11/21 0913       Plan    Plan  [facility] Admissions Director  will not accept the patient until she passes her swallow study and social work will fax the results to the [facility]. The [facility] will do a telephone assessment today if the patient passes her swallow study.    Row Name 03/11/21 0854       Plan    Plan  Social work was able to get an ambulance for 4:45 pm today to the [facility] as long as the [facility] will accept the patient for inpatient treatment. [facility] has the medical records that were sent to them this morning.    Row Name 03/11/21 0814       Plan    Plan  Social work called [facility] at [phone] option 2  and faxed medical records to [facility] to review today.        Discharge Codes    No documentation.       Expected Discharge Date and Time     Expected Discharge Date Expected Discharge Time    Mar 6, 2021             ZARIA Morales

## 2021-03-11 NOTE — PROGRESS NOTES
Case Management Discharge Note      Final Note: Met with the patient and let her know that Dr. Barnett the Psychiatrist at the [facility] is accepting her today and she will go to the [facility] at 4:45 pm by ambulance. The patient is agreeable according to her to go as an inpatient to the [facility].  She gave permission for social work to call her mother Abbie Looney and let her know about the plan to discharge to [facility] today at 4:45 pm.    Provided Post Acute Provider List?: Yes  Delivered To: Patient  Method of Delivery: In person    Selected Continued Care - Admitted Since 2/21/2021     Destination    No services have been selected for the patient.              Durable Medical Equipment    No services have been selected for the patient.              Dialysis/Infusion    No services have been selected for the patient.              Home Medical Care    No services have been selected for the patient.              Therapy    No services have been selected for the patient.              Community Resources    No services have been selected for the patient.                  Transportation Services  Ambulance: [facility]    Final Discharge Disposition Code: 65 - psychiatric hospital or unit

## 2021-03-11 NOTE — PROGRESS NOTES
Continued Stay Note  [facility]     Patient Name: Elvia Montero  MRN: [id number]  Today's Date: 3/11/2021    Admit Date: 2/21/2021    Discharge Plan     Row Name 03/11/21 1010       Plan    Plan  [facility] is another option for the patient if the [facility] will not accept her and social work can send a referral to them as well today.    Row Name 03/11/21 0948       Plan    Plan  Social work called [facility] and they do not have any beds currently on the unit.  Social work call [facility] and they are checking to see if they can accept a patient on a thickened diet. [facility] in [place] said they can not accept a modied diet. [facility] [place] does not have any beds.    Row Name 03/11/21 0913       Plan    Plan  [facility] Admissions Director  will not accept the patient until she passes her swallow study and social work will fax the results to the [facility]. The [facility] will do a telephone assessment today if the patient passes her swallow study.    Row Name 03/11/21 0854       Plan    Plan  Social work was able to get an ambulance for 4:45 pm today to the [facility] as long as the [facility] will accept the patient for inpatient treatment. [facility] has the medical records that were sent to them this morning.    Row Name 03/11/21 0814       Plan    Plan  Social work called [facility] at [phone] option 2  and faxed medical records to [facility] to review today.        Discharge Codes    No documentation.       Expected Discharge Date and Time     Expected Discharge Date Expected Discharge Time    Mar 6, 2021             ZARIA Morales

## 2021-03-11 NOTE — PROGRESS NOTES
Continued Stay Note  [facility]     Patient Name: Elvai Montero  MRN: [id number]  Today's Date: 3/11/2021    Admit Date: 2/21/2021    Discharge Plan     Row Name 03/11/21 0814       Plan    Plan  Social work called [facility] at [phone] option 2  and faxed medical records to [facility] to review today.        Discharge Codes    No documentation.       Expected Discharge Date and Time     Expected Discharge Date Expected Discharge Time    Mar 6, 2021             ZARIA Morales

## 2021-03-11 NOTE — PLAN OF CARE
Pt vss, on RA. Pt has walked the halls a few times throughout the shift. Pt has been anxious but scheduled meds seemed to help. Pt was able to finally get some rest last night. Plan for a barium swallow this AM and pt is hopeful to be sent to the [facility] today. Pt has no complaints at this time.

## 2021-03-11 NOTE — PLAN OF CARE
Problem: Adult Inpatient Plan of Care  Goal: Plan of Care Review  Outcome: Adequate for Care Transition  Flowsheets (Taken 3/11/2021 0397)  Progress: improving  Outcome Summary: [facility]   Goal Outcome Evaluation:     Progress: improving  Outcome Summary: [facility]

## 2021-03-11 NOTE — MBS/VFSS/FEES
Acute Care - Speech Language Pathology   Swallow Re-Evaluation [facility]   Modified Barium Swallow Study (MBS)     Patient Name: Elvia Montero  : 1975  MRN: [id number]  Today's Date: 3/11/2021               Admit Date: 2021    Visit Dx:     ICD-10-CM ICD-9-CM   1. Suicide attempt (CMS/[facility])  T14.91XA E958.9   2. Toxic effect of caustic substance, intentional self-harm, initial encounter (CMS/[facility])  T54.92XA 983.9     E950.7   3. Esophagitis  K20.90 530.10   4. 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[facility])   • Bipolar 1 disorder (CMS/[facility])   • Obsessive compulsive disorder   • GERD (gastroesophageal reflux disease)   • Pharyngeal dysphagia   • Probable Aspiration Pneumonia (CMS/[facility])   • Hypothyroidism (acquired)     Past Medical History:   Diagnosis Date   • Anxiety    • Bipolar 1 disorder (CMS/[facility])    • Depression    • Disease of thyroid gland    • Esophagitis    • Gastroparesis    • GERD (gastroesophageal reflux disease)    • Obsessive compulsive disorder      Past Surgical History:   Procedure Laterality Date   • ENDOSCOPY N/A 2021    Procedure: ESOPHAGOGASTRODUODENOSCOPY;  Surgeon: Chaparro Chester MD;  Location: [facility] ENDOSCOPY;  Service: Gastroenterology;  Laterality: N/A;   • NECK SURGERY          SWALLOW EVALUATION (last 72 hours)      SLP Adult Swallow Evaluation     Row Name 21 0940 03/10/21 1015 21 1500 21 1410          Rehab Evaluation    Document Type  re-evaluation  -AC  therapy note (daily note)  -VO  therapy note (daily note)  -RD (r) MT (t) RD (c)  re-evaluation;other (see comments) FEES  -RD     Subjective Information   no complaints  -AC  no complaints  -VO  no complaints  -RD (r) MT (t) RD (c)  no complaints  -RD     Patient Observations  alert;cooperative  -AC  alert;cooperative  -VO  alert;cooperative;agree to therapy  -RD (r) MT (t) RD (c)  alert;cooperative;agree to therapy  -RD     Patient/Family/Caregiver Comments/Observations  Sitter present.  -AC  sitter present  -VO  Sitter present   -RD (r) MT (t) RD (c)  Sitter present  -RD     Care Plan Review  --  care plan/treatment goals reviewed;risks/benefits reviewed  -VO  evaluation/treatment results reviewed;care plan/treatment goals reviewed;risks/benefits reviewed;current/potential barriers reviewed;patient/other agree to care plan  -RD (r) MT (t) RD (c)  --     Patient Effort  good  -AC  excellent  -VO  good  -RD (r) MT (t) RD (c)  good  -RD     Symptoms Noted During/After Treatment  --  --  none  -RD (r) MT (t) RD (c)  --        General Information    Patient Profile Reviewed  yes  -AC  --  --  yes  -RD     Pertinent History Of Current Problem  See previous eval.  -AC  --  --  Adm w/ alcohol withdrawal, attempted suicide, toxic effect of caustic substance, esophageal inflammation, does not accept blood products 2' Jehovah's wittness. Hospitalist team called about swallowing re-evaluation. Silent aspiration and edema on prior FEES. Repeat FEES evaluation today to see if any improvement.   -RD     Current Method of Nutrition  pureed with some mashed;nectar/syrup-thick liquids  -AC  --  --  NPO;nasogastric feedings  -RD     Precautions/Limitations, Vision  --  --  --  WFL;for purposes of eval  -RD     Precautions/Limitations, Hearing  --  --  --  WFL;for purposes of eval  -RD     Prior Level of Function-Communication  --  --  --  unknown  -RD     Prior Level of Function-Swallowing  --  --  --  safe, efficient swallowing in all situations  -RD     Plans/Goals Discussed with  patient;agreed upon  -AC  --  --  patient;agreed upon  -RD     Barriers to Rehab  medically complex   -AC  --  --  medically complex  -RD     Patient's Goals for Discharge  return to regular diet d/c to [place] [facility] (on regular diet)  -AC  --  --  eat/drink without coughing/choking;return to regular diet  -RD        Pain    Additional Documentation  --  --  Pain Scale: Numbers Pre/Post-Treatment (Group)  -RD (r) MT (t) RD (c)  Pain Scale: Numbers Pre/Post-Treatment (Group)  -RD        Pain Scale: Numbers Pre/Post-Treatment    Pretreatment Pain Rating  0/10 - no pain  -AC  0/10 - no pain  -VO  0/10 - no pain  -RD (r) MT (t) RD (c)  0/10 - no pain  -RD     Posttreatment Pain Rating  0/10 - no pain  -AC  0/10 - no pain  -VO  0/10 - no pain  -RD (r) MT (t) RD (c)  0/10 - no pain  -RD        General Eating/Swallowing Observations    Respiratory Support Currently in Use  room air  -AC  --  --  --     Eating/Swallowing Skills  fed by SLP;self-fed;appropriate self-feeding skills observed  -AC  --  --  --     Positioning During Eating  upright 90 degree;upright in chair  -AC  --  --  --        MBS/VFSS    Utensils Used  spoon;cup;straw  -AC  --  --  --     Consistencies Trialed  thin liquids;pudding thick;regular textures  -AC  --  --  --        MBS/VFSS Interpretation    Oral Prep Phase  WFL  -AC  --  --  --     Oral Transit Phase  WFL  -AC  --  --  --     Oral Residue  WFL  -AC  --  --  --        Initiation of Pharyngeal Swallow    Initiation of Pharyngeal Swallow  WFL  -AC  --  --  --     Pharyngeal Phase  functional pharyngeal phase of swallowing  -AC  --  --  --     Penetration Before the Swallow  thin liquids;other (see comments) x1 w/ initial tsp only  -AC  --  --  --     Penetration During the Swallow  thin liquids;other (see comments) w/ consecutive drinks only  -AC  --  --  --     Response to Penetration  shallow;transient;other (see comments) trace amount  -AC  --  --  --     Rosenbek's Scale  thin:;2--->level 2;pudding/puree:;regular textures:;1--->level 1  -AC  --  --  --     Pharyngeal Residue  other (see  comments) no significant pharyngeal residue  -AC  --  --  --     Pharyngeal Phase, Comment  Pt presented w/ safe and grossly functional oropharyngeal swallow. Timing was adequate. There was intermittent penetration w/ thin liquid; however, was consistently minimal/trace amount and easily expelled laryngeal vestibule w/ completion of the swallow and no evidence of aspiration. No penetration/aspiration w/ pudding or solid.  -AC  --  --  --        Fiberoptic Endoscopic Evaluation of Swallowing (FEES)    Risks/Benefits Reviewed  --  --  --  risks/benefits explained;patient;agreed to eval  -RD     Nasal Entry  --  --  --  right:  -RD     Scope serial number/identification  --  --  --  338  -RD        Anatomy and Physiology    Anatomic Considerations  --  --  --  edema;erythema;arytenoids  -RD     Comment  --  --  --  Edma improved. Vocal cords were visible during this exam and appeared WNL. Arytenoid edema was still impacting swallow.   -RD     Velopharyngeal  --  --  --  WFL  -RD     Base of Tongue  --  --  --  symmetrical;range reduced  -RD     Epiglottis  --  --  --  WFL  -RD     Laryngeal Function Breathing  --  --  --  symmetrical  -RD     Laryngeal Function Phonation  --  --  --  symmetrical  -RD     Laryngeal Function to Breath Hold  --  --  --  TVF contact  -RD     Secretion Rating Scale (Shailesh et al. 1996)  --  --  --  0- normal, no visible secretions  -RD     Secretion Description  --  --  --  thin;clear  -RD     Ice Chips  --  --  --  DNA  -RD     Spontaneous Swallow  --  --  --  frequency reduced  -RD     Sensory  --  --  --  reduced sensation  -RD     Utensils Used  --  --  --  Spoon;Cup;Straw  -RD     Consistencies Trialed  --  --  --  thin liquids;nectar-thick liquids;pudding/puree;regular textures;spoon;cup;straw  -RD        FEES Interpretation    Oral Phase  --  --  --  prolonged manipulation;reduced lingual control;with solids only  -RD     Oral Phase, Comment  --  --  --  Mild oral dysphagia  -RD         Initiation of Pharyngeal Swallow    Initiation of Pharyngeal Swallow  --  --  --  bolus in pyriform sinuses  -RD     Pharyngeal Phase  --  --  --  impaired pharyngeal phase of swallowing  -RD     Penetration Before the Swallow  --  --  --  thin liquids;nectar-thick liquids;secondary to delayed swallow initiation or mistiming;other (see comments) nectar-thick via straw  -RD     Aspiration During the Swallow  --  --  --  thin liquids;nectar-thick liquids;secondary to delayed swallow initiation or mistiming;secondary to reduced vestibular closure;other (see comments) nectar-thick via straw  -RD     Penetration After the Swallow  --  --  --  thin liquids;secondary to residue;in pyriform sinuses  -RD     Response to Penetration  --  --  --  no response  -RD     Response to Aspiration  --  --  --  no response, silent aspiration  -RD     Rosenbek's Scale  --  --  --  thin:;nectar:;8-->Level 8;pudding/puree:;1-->Level 1  -RD     Residue  --  --  --  all consistencies tested;base of tongue;valleculae;pyriform sinuses;secondary to reduced base of tongue retraction;secondary to reduced hyolaryngeal excursion;other (see comments) mild w/ all; moderate-severe w/ solid; edema impacting  -RD     Response to Residue  --  --  --  with spontaneous subsequent swallow;with cued swallow;other (see comments) reduced; couldn't clear solids  -RD     Attempted Compensatory Maneuvers  --  --  --  bolus size;bolus presentation style;additional subsequent swallow;multiple swallows;throat clear after swallow;combination of maneuvers (see comments)  -RD     Response to Attempted Compensatory Maneuvers  --  --  --  prevented aspiration;reduced residue  -RD     Pharyngeal Phase, Comment  --  --  --  Mild-moderate pharyngeal dysphagia. Penetration before w/ aspiration during the swallow w/ thins & nectar-thick liquid via straw 2' delayed initiation and reduced vestibular closure. Penetration/aspiration after the swallow w/ thins as well 2'  pyriform sinus residue (edema of arytenoids contributing to residue). Aspiration was silent, pt could not clear aspiration, even w/ cued cough. Penetration after the swallow w/ solids 2' diffuse residue coating keofeed. Pt having difficulty clearing solids thru pharynx 2' arytenoid edema. No penetration/aspiration w/ small sips of nectar-thick liquids via tsp/cup or pudding consistencies. Mild BOT/vallecular and pyriform sinus residue w/ all consistencies (moderate-severe w/ solids) 2' reduced BOT retraction and decr'd excursion (+ edema impacting). Pt able to reduce residue for liquids/pudding w/ cued multiple swallows and sometimes spontaneous secondary swallow. Extensive education provided to patient on dysphagia, modified diet and goals during treatment. Discussed results w/ APRN on hospitalist team and RN. Will continue pharyngeal strengthening during treatment.  -RD        Clinical Impression    Daily Summary of Progress (SLP)  --  progress toward functional goals is good  -VO  progress toward functional goals is good  -RD (r) MT (t) RD (c)  --     SLP Swallowing Diagnosis  functional oral phase;functional pharyngeal phase  -AC  --  --  mild;oral dysphagia;mild-moderate;pharyngeal dysphagia  -RD     Functional Impact  --  --  --  risk of aspiration/pneumonia  -RD     Rehab Potential/Prognosis, Swallowing  --  --  --  good, to achieve stated therapy goals  -RD     Swallow Criteria for Skilled Therapeutic Interventions Met  no problems identified which require skilled intervention  -AC  --  --  demonstrates skilled criteria  -RD     Plan for Continued Treatment (SLP)  --  Pt able to independently demonstrate majority of dysphagia exercises and complete several repetitions. Mild cues and demonstration required for eron. No overt s/sxs aspiration w/ thins will plan for repeat instrumental tomorrow for swallow function progress via MBS.  -VO  Reviewed swallowing exercises, handout left at bedside. Good  participation. Tolerating current diet. Delayed cough noted following PO trials however pt and NA report this has been happening when she talks due to sore throat from keofeed being pulled. Will continue to monitor diet tolerance closely. Pt has plans to d/c to [place] [facility] if able to upgrade to regular diet. Plan for instrumental on Thursday to reasses dysphagia.   -RD (r) MT (t) RD (c)  --        Recommendations    Therapy Frequency (Swallow)  --  5 days per week  -VO  5 days per week  -RD (r) MT (t) RD (c)  5 days per week  -RD     Predicted Duration Therapy Intervention (Days)  --  until discharge  -VO  until discharge  -RD (r) MT (t) RD (c)  until discharge  -RD     SLP Diet Recommendation  regular textures;thin liquids  -AC  puree with some mashed;nectar thick liquids;ice chips between meals after oral care, with supervision  -VO  puree with some mashed;nectar thick liquids;ice chips between meals after oral care, with supervision No straw   -RD (r) MT (t) RD (c)  puree with some mashed;nectar thick liquids;ice chips between meals after oral care, with supervision  -RD     Recommended Diagnostics  --  reassess via VFSS (MBS)  -VO  --  FEES;other (see comments) completed 3/8/2021  -RD     Recommended Precautions and Strategies  upright posture during/after eating;general aspiration precautions  -AC  upright posture during/after eating;small bites of food and sips of liquid;no straw;multiple swallows per bite of food;multiple swallows per sip of liquid;general aspiration precautions  -VO  upright posture during/after eating;small bites of food and sips of liquid;no straw;multiple swallows per bite of food;multiple swallows per sip of liquid;general aspiration precautions  -RD (r) MT (t) RD (c)  upright posture during/after eating;small bites of food and sips of liquid;no straw;multiple swallows per bite of food;multiple swallows per sip of liquid;general aspiration precautions  -RD     Oral Care Recommendations   Oral Care BID/PRN  -AC  Oral Care BID/PRN  -VO  Oral Care BID/PRN  -RD (r) MT (t) RD (c)  Oral Care BID/PRN  -RD     SLP Rec. for Method of Medication Administration  meds whole;with thin liquids;with pudding or applesauce;as tolerated  -AC  meds crushed;with pudding or applesauce;as tolerated  -VO  meds crushed;with pudding or applesauce;as tolerated  -RD (r) MT (t) RD (c)  meds crushed;with pudding or applesauce;as tolerated  -RD     Monitor for Signs of Aspiration  --  yes;notify SLP if any concerns  -VO  yes;notify SLP if any concerns  -RD (r) MT (t) RD (c)  yes;notify SLP if any concerns  -RD     Anticipated Discharge Disposition (SLP)  --  unknown;anticipate therapy at next level of care  -VO  unknown;anticipate therapy at next level of care  -RD (r) MT (t) RD (c)  unknown;anticipate therapy at next level of care  -RD       User Key  (r) = Recorded By, (t) = Taken By, (c) = Cosigned By    Initials Name Effective Dates    AC Mar Guidry, MS CCC-SLP 07/27/17 -     RD Sherry Wooten, MS CCC-SLP 04/03/18 -     VO Bettye Greene MA,CCC-SLP 08/09/20 -     Isela Nair, Speech Therapy Student 01/14/21 -           EDUCATION  The patient has been educated in the following areas:   Oral Care/Hydration.    SLP Recommendation and Plan  SLP Swallowing Diagnosis: functional oral phase, functional pharyngeal phase  SLP Diet Recommendation: regular textures, thin liquids  Recommended Precautions and Strategies: upright posture during/after eating, general aspiration precautions  SLP Rec. for Method of Medication Administration: meds whole, with thin liquids, with pudding or applesauce, as tolerated      Swallow Criteria for Skilled Therapeutic Interventions Met: no problems identified which require skilled intervention           Plan of Care Reviewed With: patient  Progress: improving    SLP GOALS     Row Name 03/11/21 0940 03/10/21 1015 03/09/21 1500       Oral Nutrition/Hydration Goal 1 (SLP)    Oral  Nutrition/Hydration Goal 1, SLP  LTG: Pt will return to regular diet & thin liquids w/ no overt s/s of aspiration w/ 100% accuracy w/o cues  -AC  LTG: Pt will return to regular diet & thin liquids w/ no overt s/s of aspiration w/ 100% accuracy w/o cues  -VO  LTG: Pt will return to regular diet & thin liquids w/ no overt s/s of aspiration w/ 100% accuracy w/o cues  -RD (r) MT (t) RD (c)    Time Frame (Oral Nutrition/Hydration Goal 1, SLP)  by discharge  -AC  by discharge  -VO  by discharge  -RD (r) MT (t) RD (c)    Progress/Outcomes (Oral Nutrition/Hydration Goal 1, SLP)  goal met  -AC  continuing progress toward goal  -VO  continuing progress toward goal  -RD (r) MT (t) RD (c)       Oral Nutrition/Hydration Goal 2 (SLP)    Oral Nutrition/Hydration Goal 2, SLP  Pt will tolerate nectar-thick liquids and Level 3-pureed w/ some mashed diet w/o s/s of aspiration w/ 100% accuracy w/o cues  -AC  Pt will tolerate nectar-thick liquids and Level 3-pureed w/ some mashed diet w/o s/s of aspiration w/ 100% accuracy w/o cues  -VO  Pt will tolerate nectar-thick liquids and Level 3-pureed w/ some mashed diet w/o s/s of aspiration w/ 100% accuracy w/o cues  -RD (r) MT (t) RD (c)    Time Frame (Oral Nutrition/Hydration Goal 2, SLP)  short term goal (STG)  -AC  short term goal (STG)  -VO  short term goal (STG)  -RD (r) MT (t) RD (c)    Barriers (Oral Nutrition/Hydration Goal 2, SLP)  --  --  Delayed cough following PO trials, NA and Pt state this has been happening when patient talks, suspect not related to swallow.   -RD (r) MT (t) RD (c)    Progress/Outcomes (Oral Nutrition/Hydration Goal 2, SLP)  goal no longer appropriate  -AC  continuing progress toward goal  -VO  continuing progress toward goal  -RD (r) MT (t) RD (c)       Lingual Strengthening Goal 1 (SLP)    Activity (Lingual Strengthening Goal 1, SLP)  --  increase lingual tone/sensation/control/coordination/movement;increase tongue back strength  -VO  increase lingual  tone/sensation/control/coordination/movement;increase tongue back strength  -RD (r) MT (t) RD (c)    Increase Lingual Tone/Sensation/Control/Coordination/Movement  lingual resistance exercises  -AC  lingual resistance exercises  -VO  lingual resistance exercises  -RD (r) MT (t) RD (c)    Increase Tongue Back Strength  swallow trials;lingual resistance exercises  -AC  swallow trials;lingual resistance exercises  -VO  swallow trials;lingual resistance exercises  -RD (r) MT (t) RD (c)    Stephenson/Accuracy (Lingual Strengthening Goal 1, SLP)  with minimal cues (75-90% accuracy)  -AC  with minimal cues (75-90% accuracy)  -VO  with minimal cues (75-90% accuracy)  -RD (r) MT (t) RD (c)    Time Frame (Lingual Strengthening Goal 1, SLP)  short term goal (STG)  -AC  short term goal (STG)  -VO  short term goal (STG)  -RD (r) MT (t) RD (c)    Progress/Outcomes (Lingual Strengthening Goal 1, SLP)  goal no longer appropriate  -AC  continuing progress toward goal  -VO  continuing progress toward goal  -RD (r) MT (t) RD (c)       Pharyngeal Strengthening Exercise Goal 1 (SLP)    Activity (Pharyngeal Strengthening Goal 1, SLP)  --  increase timing;increase anterior movement of the hyolaryngeal complex;increase closure at entrance to airway/closure of airway at glottis;increase tongue base retraction  -VO  increase timing;increase anterior movement of the hyolaryngeal complex;increase closure at entrance to airway/closure of airway at glottis;increase tongue base retraction  -RD (r) MT (t) RD (c)    Increase Timing  prepping - 3 second prep or suck swallow or 3-step swallow  -AC  prepping - 3 second prep or suck swallow or 3-step swallow  -VO  prepping - 3 second prep or suck swallow or 3-step swallow  -RD (r) MT (t) RD (c)    Increase Anterior Movement of the Hyolaryngeal Complex  chin tuck against resistance (CTAR)  -AC  chin tuck against resistance (CTAR)  -VO  chin tuck against resistance (CTAR)  -RD (r) MT (t) RD (c)     Increase Closure at Entrance to Airway/Closure of Airway at Glottis  supraglottic swallow  -AC  supraglottic swallow  -VO  supraglottic swallow  -RD (r) MT (t) RD (c)    Increase Squeeze/Positive Pressure Generation  effortful pitch glide (falsetto + pharyngeal squeeze)  -AC  effortful pitch glide (falsetto + pharyngeal squeeze)  -VO  effortful pitch glide (falsetto + pharyngeal squeeze)  -RD (r) MT (t) RD (c)    Increase Tongue Base Retraction  hard effortful swallow;eron  -AC  hard effortful swallow;eron  -VO  hard effortful swallow;eron  -RD (r) MT (t) RD (c)    Hodgeman/Accuracy (Pharyngeal Strengthening Goal 1, SLP)  with minimal cues (75-90% accuracy)  -AC  with minimal cues (75-90% accuracy)  -VO  with minimal cues (75-90% accuracy)  -RD (r) MT (t) RD (c)    Time Frame (Pharyngeal Strengthening Goal 1, SLP)  short term goal (STG)  -AC  short term goal (STG)  -VO  short term goal (STG)  -RD (r) MT (t) RD (c)    Barriers (Pharyngeal Strengthening Goal 1, SLP)  --  able to independently demonstrate most, mild cues needed for eron  -VO  Completed all x5, good participation, delayed initiation of swallow w/ three second prep and supraglottic.   -RD (r) MT (t) RD (c)    Progress/Outcomes (Pharyngeal Strengthening Goal 1, SLP)  goal no longer appropriate  -AC  continuing progress toward goal  -VO  continuing progress toward goal  -RD (r) MT (t) RD (c)       Swallow Compensatory Strategies Goal 1 (SLP)    Activity (Swallow Compensatory Strategies/Techniques Goal 1, SLP)  compensatory strategies;postural techniques;small bites;small cup sips;extra swallow per bolus  -AC  compensatory strategies;postural techniques;small bites;small cup sips;extra swallow per bolus  -VO  compensatory strategies;postural techniques;small bites;small cup sips;extra swallow per bolus  -RD (r) MT (t) RD (c)    Hodgeman/Accuracy (Swallow Compensatory Strategies/Techniques Goal 1, SLP)  independently (over 90% accuracy)  -AC   independently (over 90% accuracy)  -VO  independently (over 90% accuracy)  -RD (r) MT (t) RD (c)    Time Frame (Swallow Compensatory Strategies/Techniques Goal 1, SLP)  short term goal (STG)  -AC  short term goal (STG)  -VO  short term goal (STG)  -RD (r) MT (t) RD (c)    Barriers (Swallow Compensatory Strategies/Techniques Goal 1, SLP)  --  --  Pt reports use of strategies during meals and demonstrated independent use during PO trials.   -RD (r) MT (t) RD (c)    Progress/Outcomes (Swallow Compensatory Strategies/Techniques Goal 1, SLP)  goal no longer appropriate  -AC  continuing progress toward goal  -VO  continuing progress toward goal  -RD (r) MT (t) RD (c)    Row Name 03/08/21 1410             Oral Nutrition/Hydration Goal 1 (SLP)    Oral Nutrition/Hydration Goal 1, SLP  LTG: Pt will return to regular diet & thin liquids w/ no overt s/s of aspiration w/ 100% accuracy w/o cues  -RD      Time Frame (Oral Nutrition/Hydration Goal 1, SLP)  by discharge  -RD      Progress/Outcomes (Oral Nutrition/Hydration Goal 1, SLP)  goal revised this date;continuing progress toward goal  -RD         Oral Nutrition/Hydration Goal 2 (SLP)    Oral Nutrition/Hydration Goal 2, SLP  Pt will tolerate nectar-thick liquids and Level 3-pureed w/ some mashed diet w/o s/s of aspiration w/ 100% accuracy w/o cues  -RD      Time Frame (Oral Nutrition/Hydration Goal 2, SLP)  short term goal (STG)  -RD      Barriers (Oral Nutrition/Hydration Goal 2, SLP)  Pt showing inconsistent TC and/or hard immediate cough with ice trials. May be indicating improved pharyngeal sensation.  -RD      Progress/Outcomes (Oral Nutrition/Hydration Goal 2, SLP)  goal revised this date  -RD         Lingual Strengthening Goal 1 (SLP)    Activity (Lingual Strengthening Goal 1, SLP)  increase lingual tone/sensation/control/coordination/movement;increase tongue back strength  -RD      Increase Lingual Tone/Sensation/Control/Coordination/Movement  lingual resistance  exercises  -RD      Ingham/Accuracy (Lingual Strengthening Goal 1, SLP)  with minimal cues (75-90% accuracy)  -RD      Time Frame (Lingual Strengthening Goal 1, SLP)  short term goal (STG)  -RD      Progress/Outcomes (Lingual Strengthening Goal 1, SLP)  goal revised this date;continuing progress toward goal  -RD         Pharyngeal Strengthening Exercise Goal 1 (SLP)    Activity (Pharyngeal Strengthening Goal 1, SLP)  increase timing;increase anterior movement of the hyolaryngeal complex;increase closure at entrance to airway/closure of airway at glottis;increase tongue base retraction  -RD      Increase Timing  prepping - 3 second prep or suck swallow or 3-step swallow  -RD      Increase Anterior Movement of the Hyolaryngeal Complex  chin tuck against resistance (CTAR)  -RD      Increase Closure at Entrance to Airway/Closure of Airway at Glottis  supraglottic swallow  -RD      Increase Squeeze/Positive Pressure Generation  effortful pitch glide (falsetto + pharyngeal squeeze)  -RD      Increase Tongue Base Retraction  hard effortful swallow;eron  -RD      Ingham/Accuracy (Pharyngeal Strengthening Goal 1, SLP)  with minimal cues (75-90% accuracy)  -RD      Time Frame (Pharyngeal Strengthening Goal 1, SLP)  short term goal (STG)  -RD      Progress/Outcomes (Pharyngeal Strengthening Goal 1, SLP)  goal revised this date;continuing progress toward goal  -RD         Swallow Compensatory Strategies Goal 1 (SLP)    Activity (Swallow Compensatory Strategies/Techniques Goal 1, SLP)  compensatory strategies;postural techniques;small bites;small cup sips;extra swallow per bolus  -RD      Ingham/Accuracy (Swallow Compensatory Strategies/Techniques Goal 1, SLP)  independently (over 90% accuracy)  -RD      Time Frame (Swallow Compensatory Strategies/Techniques Goal 1, SLP)  short term goal (STG)  -RD        User Key  (r) = Recorded By, (t) = Taken By, (c) = Cosigned By    Initials Name Provider Type    JOE Guidry,  Mar GILLESPIE, MS CCC-SLP Speech and Language Pathologist    Sherry Chávez, MS CCC-SLP Speech and Language Pathologist    Bettye Orozco MA,CCC-SLP Speech and Language Pathologist    Isela Nair, Speech Therapy Student Speech Therapy Student             Time Calculation:   Time Calculation- SLP     Row Name 03/11/21 1011             Time Calculation- SLP    SLP Start Time  0940  -      SLP Received On  03/11/21  -        User Key  (r) = Recorded By, (t) = Taken By, (c) = Cosigned By    Initials Name Provider Type     Mar Guidry MS CCC-SLP Speech and Language Pathologist          Therapy Charges for Today     Code Description Service Date Service Provider Modifiers Qty    [id number] HC ST MOTION FLUORO EVAL SWALLOW 3 3/11/2021 Mar Guidry MS CCC-SLP GN 1        Patient was not wearing a face mask and did not exhibit coughing during this therapy encounter.  Procedure performed was aerosolizing, involved close contact (within 6 feet for at least 15 minutes or longer), and did not involve contact with infectious secretions or specimens.  Therapist used appropriate personal protective equipment including gloves, standard procedure mask, eye protection and N95 mask.  Appropriate PPE was worn during the entire therapy session.  Hand hygiene was completed before and after therapy session.          Mar Guidry MS CCC-SLP  3/11/2021

## 2021-03-11 NOTE — DISCHARGE SUMMARY
[facility] Medicine Services  TRANSFER SUMMARY    Patient Name: Elvia Montero  : 1975  MRN: [id number]    Date of Admission: 2021  Date of Discharge: 21  Length of Stay: 18  Primary Care Physician: Provider, No Known    Consults     Date and Time Order Name Status Description    2021  9:49 AM Inpatient Nephrology Consult Completed     2021 12:32 AM Inpatient Gastroenterology Consult Completed           Hospital Course     Presenting Problem:   Suicide attempt (CMS/[facility]) [T14.91XA]    Active Hospital Problems    Diagnosis  POA   • **Suicide attempt (CMS/HCC) [T14.91XA]  Yes   • Bipolar 1 disorder (CMS/HCC) [F31.9]  Yes   • GERD (gastroesophageal reflux disease) [K21.9]  Yes   • Hypothyroidism (acquired) [E03.9]  Yes   • 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 female who is Confucianist with PMH GERD, Hypothyroidism, Gastroparesis, Bipolar DO and OCD who was admitted  with a suicide attempt by drinking 1/2 cup Mr. John, drinking a bottle of wine, ingesting several Klonopin and Seroquel.  She had N/V immediately following ingestion.  GI was consulted for possible esophageal injury.  She underwent EGD  that revealed Grade C Esophagitis with minimal injury and inflamed uvula.  She developed alcohol withdrawal and was placed on CIWA, scheduled Valium, Vitamins and Precedex infusion.  She had c/o pain and was also on Dilaudid PCA initially.  She was also placed on Seroquel, Melatonin and prn Geodon.  She developed a fever and completed a course of Zosyn for possible aspiration.  She failed a FEES 3/3 with severe pharyngeal dysphagia due to diffuse edema w/copious secretions.  NG was changed to [place] and she was placed on TF.  She was eventually transitioned to a modified dysphagia diet but passed her MBS on date of discharge and changed to regular diet.  Nephrology was consulted for Hypernatremia with high urinary output.  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  She was placed on D5, Hydrochlorothiazide and given DDAVP.  She was also started on IV iron for Fe Deficiency Anemia.  Her withdrawal improved and she became more oriented.  She is currently only on Valium/ativan prn and feels her anxiety has recently worsened in anticipation of going to [place].    She was transferred out of the ICU to the PCU 3/4 and hospitalists assumed care on 3/7/21.  She has remained stable and waiting to pass MBS to be able to go the [place].     Severe pharyngeal dysphagia, resolved  Esophagitis due to toxic injury  -Passed FEES 3/11/2021  --continue PPI     Anxiety  -Lithium was stopped due to concerns for nephrogenic DI  -Continue Paxil  -Continue Valium, Atarax; symptoms worsen bedtime, continue as needed bedtime Ativan dose.  Received one time dose of 0.5mg ativan prior to transfer due to increased anxiety     Suicide attempt  -Ingested Mr. John, bottle of wine, Klonopin, Seroquel  -Has sitter, suicide precautions  -Agreeable to discharge to inpatient psych facility, has been at the [place] previously, addiction team following     Thrombocythemia  -Suspecting acute phase reactant, stable/slightly trending down, intermittent monitoring; recommend outpatient recheck after psychiatric facility evaluation     S/p alcohol withdrawal  Alcohol abuse with dependence  -Benzodiazepine use for anxiety not withdrawal, at this time     S/p  nephrogenic diabetes insipidus with hypernatremia  -Felt to be related to chronic lithium use  -S/p DDAVP  -Continue HCTZ, started by nephrology  -Nephrology signed off 3/6/2021     Iron deficiency anemia  -S/p IV iron per nephrology     Hypothyroidism  -Continue Synthroid     Impaired glucose tolerance  -A1c 6.1%     Rastafarian, refuses blood products per prior documentation        Discharge Follow Up Recommendations for outpatient labs/diagnostics:  PCP after dc from [place]    Day of Discharge     HPI:   Feels her anxiety is worse in anticipation of going to [place] for next step in care    Review of Systems  Gen- No fevers, chills  CV- No chest pain, palpitations  Resp- No cough, dyspnea  GI- No N/V/D, abd pain    Vital Signs:   Temp:  [96.8 °F (36 °C)-98.9 °F (37.2 °C)] 98 °F (36.7 °C)  Heart Rate:  [] 66  Resp:  [16-18] 18  BP: (140-161)/(77-99) 154/77     Physical Exam:  Constitutional: No acute distress, awake, alert, ambulating in room getting ready to shower, sitter at bedside  HENT: NCAT, mucous membranes moist  Respiratory: Clear to auscultation bilaterally, respiratory effort normal   Cardiovascular: RRR, no murmurs, rubs, or gallops  Gastrointestinal: Positive bowel sounds, soft, nontender, nondistended, obese  Musculoskeletal: No bilateral ankle edema  Psychiatric: Appropriate affect, cooperative, appears anxious  Neurologic: Oriented x 3, YOU, speech clear  Skin: No rashes noted      Pertinent Results     Results from last 7 days   Lab Units 03/11/21  0753 03/10/21  0510 03/08/21  0659 03/07/21  0855 03/06/21  0758 03/05/21  2211 03/05/21  0602   WBC 10*3/mm3 5.40 4.70 8.08 9.23  --   --  8.05   HEMOGLOBIN g/dL 11.2* 11.2* 10.3* 10.4*  --   --  10.0*   HEMATOCRIT % 38.8 39.0 37.0 36.2  --   --  35.9   PLATELETS 10*3/mm3 755* 754* 778* 855*  --   --  634*   SODIUM mmol/L 136  --  138 139 137  --  139   POTASSIUM mmol/L 4.1  --  3.6 4.6 4.1 4.3 3.2*   CHLORIDE mmol/L 99  --  102 103 101  --   99   CO2 mmol/L 25.0  --  22.0 26.0 24.0  --  29.0   BUN mg/dL 11  --  15 16 19  --  22*   CREATININE mg/dL 0.67  --  0.58 0.58 0.56*  --  0.64   GLUCOSE mg/dL 133*  --  170* 206* 202*  --  170*   CALCIUM mg/dL 10.4  --  9.5 10.5 9.9  --  10.2     Results from last 7 days   Lab Units 03/08/21  0659 03/07/21  0855   BILIRUBIN mg/dL <0.2 <0.2   ALK PHOS U/L 89 100   ALT (SGPT) U/L 52* 45*   AST (SGOT) U/L 31 30     Results from last 7 days   Lab Units 03/08/21  0659   TRIGLYCERIDES mg/dL 192*         Brief Urine Lab Results  (Last result in the past 365 days)      Color   Clarity   Blood   Leuk Est   Nitrite   Protein   CREAT   Urine HCG        02/26/21 1056 Yellow Clear Small (1+) Negative Negative Negative               Microbiology Results Abnormal     Procedure Component Value - Date/Time    Urine Culture - Urine, Urine, Clean Catch [[id number]]  (Normal) Collected: 02/26/21 1056    Lab Status: Final result Specimen: Urine, Clean Catch Updated: 02/27/21 1207     Urine Culture No growth    MRSA Screen, PCR (Inpatient) - Swab, Nares [[id number]]  (Normal) Collected: 02/24/21 1442    Lab Status: Final result Specimen: Swab from Nares Updated: 02/24/21 1908     MRSA PCR Negative    Narrative:      MRSA Negative    COVID PRE-OP / PRE-PROCEDURE SCREENING ORDER (NO ISOLATION) - Swab, Nasopharynx [[id number]]  (Normal) Collected: 02/21/21 2252    Lab Status: Final result Specimen: Swab from Nasopharynx Updated: 02/21/21 2324    Narrative:      The following orders were created for panel order COVID PRE-OP / PRE-PROCEDURE SCREENING ORDER (NO ISOLATION) - Swab, Nasopharynx.  Procedure                               Abnormality         Status                     ---------                               -----------         ------                     COVID-19 and FLU A/B PCR...[[id number]]  Normal              Final result                 Please view results for these tests on the individual orders.    COVID-19 and FLU A/B PCR -  Swab, Nasopharynx [[id number]]  (Normal) Collected: 02/21/21 2252    Lab Status: Final result Specimen: Swab from Nasopharynx Updated: 02/21/21 2324     COVID19 Not Detected     Influenza A PCR Not Detected     Influenza B PCR Not Detected    Narrative:      Fact sheet for providers: https://www.fda.gov/media/342258/download    Fact sheet for patients: https://www.fda.gov/media/265172/download    Test performed by PCR.          Imaging Results (All)     Procedure Component Value Units Date/Time    FL Video Swallow With Speech Single Contrast [[id number]] Resulted: 03/11/21 0934     Updated: 03/11/21 1002    SLP FEES - Fiberoptic Endo Eval Swallow [[id number]] Resulted: 03/08/21 1520     Updated: 03/08/21 1520    Narrative:      This procedure was auto-finalized with no dictation required.    XR Abdomen KUB [[id number]] Collected: 03/03/21 1805     Updated: 03/03/21 2240    Narrative:      EXAMINATION: XR ABDOMEN KUB - 03/03/2021     INDICATION: T14.91XA-Suicide attempt, initial encounter; T54.92XA-Toxic  effect of unspecified corrosive substance, intentional self-harm,  initial encounter; K20.90-Esophagitis, unspecified without bleeding;  F10.920-Alcohol use, unspecified with intoxication, uncomplicated;  R13.10-Dysphagia, unspecified. Evaluate Keofeed tube placement.     COMPARISON: 02/23/2021     FINDINGS: NG tube is no longer seen. Feeding tube has been advanced to  the level of the duodenal bulb. Bowel gas pattern is nonobstructive.       Impression:      Feeding tube tip in the duodenal bulb.     DICTATED:   03/03/2021  EDITED/ls :   03/03/2021         This report was finalized on 3/3/2021 10:37 PM by Dr. Marky Guadarrama MD.       XR Abdomen KUB [[id number]] Collected: 03/03/21 1849     Updated: 03/03/21 2240    Narrative:      EXAMINATION: XR ABDOMEN KUB - 03/03/2021     INDICATION: T14.91XA-Suicide attempt, initial encounter; T54.92XA-Toxic  effect of unspecified corrosive substance, intentional self-harm,  initial  encounter; K20.90-Esophagitis, unspecified without bleeding;  F10.920-Alcohol use, unspecified with intoxication, uncomplicated;  R13.10-Dysphagia, unspecified. Evaluate Keofeed tube placement.     COMPARISON: 5:44 PM exam of same date.     FINDINGS: 06/20/2018 exam shows the feeding tube advanced on into the  distal third or proximal fourth portion of the duodenum.       Impression:      Feeding tube tip now in the distal duodenum.     DICTATED:   03/03/2021  EDITED/ls :   03/03/2021         This report was finalized on 3/3/2021 10:37 PM by Dr. Marky Guadarrama MD.       SLP FEES - Fiberoptic Endo Eval Swallow [[id number]] Resulted: 03/03/21 1143     Updated: 03/03/21 1143    Narrative:      This procedure was auto-finalized with no dictation required.    XR Chest 1 View [[id number]] Collected: 03/02/21 0802     Updated: 03/02/21 1201    Narrative:      EXAMINATION: XR CHEST 1 VW-      INDICATION: Frequent cough; T14.91XA-Suicide attempt, initial encounter;  T54.92XA-Toxic effect of unspecified corrosive substance, intentional  self-harm, initial encounter; K20.90-Esophagitis, unspecified without  bleeding; F10.920-Alcohol use, unspecified with intoxication,  uncomplicated; R13.10-Dysphagia, unspecified.      COMPARISON: Chest x-ray 02/25/2021.     FINDINGS: Esophagogastric tube courses below the diaphragm and out of  the field-of-view. Cardiac size within normal limits. Opacifications in  the perihilar regions right greater than left consistent with  peribronchial inflammatory process versus central predominant airspace  disease similar to prior. No pneumothorax or pleural effusion.           Impression:      No significant interval change.     D:  03/02/2021  E:  03/02/2021     This report was finalized on 3/2/2021 11:57 AM by Dr. Amaury Pulido.       XR Chest 1 View [[id number]] Collected: 02/25/21 0838     Updated: 02/25/21 2204    Narrative:      EXAMINATION: XR CHEST 1 VW- 02/25/2021     INDICATION: f/u;  T14.91XA-Suicide attempt, initial encounter;  T54.92XA-Toxic effect of unspecified corrosive substance, intentional  self-harm, initial encounter; K20.90-Esophagitis, unspecified without  bleeding; F10.920-Alcohol use, unspecified with intoxication,  uncomplicated     COMPARISON: No previous chest radiograph. Chest CT scan 02/21/2021.     FINDINGS: NG tube is seen in the stomach. Heart is upper normal size.  Vasculature appears normal. Lungs are moderately well-expanded and  grossly clear. No edema, effusion or pneumothorax is seen.       Impression:      1. NG tube in the stomach.  2. No acute chest disease is seen.     D:  02/25/2021  E:  02/25/2021         This report was finalized on 2/25/2021 10:01 PM by Dr. Marky Guadarrama MD.       XR Abdomen KUB [[id number]] Collected: 02/23/21 1049     Updated: 02/23/21 1408    Narrative:      EXAMINATION: XR ABDOMEN/KUB-02/23/2021:      INDICATION: NG tube placement; T14.91XA-Suicide attempt, initial  encounter; T54.92XA-Toxic effect of unspecified corrosive substance,  intentional self-harm, initial encounter; K20.90-Esophagitis,  unspecified without bleeding; F10.920-Alcohol use, unspecified with  intoxication, uncomplicated.      COMPARISON: NONE.     FINDINGS: Esophagogastric tube terminates within the mid stomach.       Impression:      Nasogastric tube terminates within the mid stomach.     D:  02/23/2021  E:  02/23/2021     This report was finalized on 2/23/2021 2:05 PM by Dr. Amaury Pulido.       CT Chest Without Contrast Diagnostic [[id number]] Collected: 02/21/21 2100     Updated: 02/21/21 2102    Narrative:      CT Chest WO    INDICATION:   Esophagitis, concern for perforation, caustic ingestion    TECHNIQUE:   CT of the thorax without IV contrast. Coronal and sagittal reconstructions were obtained.  Radiation dose reduction techniques included automated exposure control or exposure modulation based on body size. Count of known CT and cardiac nuc med studies  performed  in previous 12 months: 0.     COMPARISON:   None available.    FINDINGS:  There is bibasilar atelectasis. The lungs are grossly clear. Fluid level is seen in the esophagus which is dilated in the mid chest. There is air that has layered anteriorly. This is thought to be likely contained within the submucosa of the esophagus.  Mediastinum and cardiac structures appear within normal limits. Abdominal structures are unremarkable apart from hepatic steatosis.      Impression:      Fluid level is seen in the esophagus which is dilated in the mid chest. There is air that has layered anteriorly. This is thought to be likely contained within the submucosa of the esophagus. However, this may be further evaluated with water-soluble  contrast and repeat chest CT scan or endoscopy if clinically indicated.    Signer Name: Thea Montero MD   Signed: 2/21/2021 9:00 PM   Workstation Name: [id number]    [facility]                  Discharge Details        Discharge Medications      New Medications      Instructions Start Date   acetaminophen 160 MG/5ML solution  Commonly known as: TYLENOL   650 mg, Oral, Every 6 Hours PRN      carvedilol 12.5 MG tablet  Commonly known as: COREG   12.5 mg, Oral, 2 Times Daily With Meals      diazePAM 2 MG tablet  Commonly known as: VALIUM   6 mg, Oral, Every 6 Hours PRN      folic acid 1 MG tablet  Commonly known as: FOLVITE   1 mg, Oral, Daily   Start Date: March 12, 2021     hydroCHLOROthiazide 12.5 MG capsule  Commonly known as: MICROZIDE   12.5 mg, Oral, Daily   Start Date: March 12, 2021     hydrOXYzine 25 MG tablet  Commonly known as: ATARAX   25 mg, Oral, 3 Times Daily PRN      LORazepam 0.5 MG tablet  Commonly known as: ATIVAN   0.5 mg, Oral, Nightly PRN      melatonin 5 MG tablet tablet   10 mg, Oral, Nightly      multivitamin chewable chewable tablet   1 tablet, Oral, Daily   Start Date: March 12, 2021     pantoprazole 40 MG EC tablet  Commonly known as: Protonix   40  mg, Oral, 2 Times Daily      potassium chloride 20 MEQ packet  Commonly known as: KLOR-CON   30 mEq, Oral, Daily   Start Date: March 12, 2021     senna 8.6 MG tablet  Commonly known as: SENOKOT   2 tablets, Oral, 2 Times Daily      thiamine 100 MG tablet  tablet  Commonly known as: VITAMIN B-1   100 mg, Oral, Daily   Start Date: March 12, 2021        Continue These Medications      Instructions Start Date   levothyroxine 88 MCG tablet  Commonly known as: SYNTHROID, LEVOTHROID   88 mcg, Oral, Daily      PARoxetine 20 MG tablet  Commonly known as: PAXIL   60 mg, Oral, Every Morning         Stop These Medications    clomiPRAMINE 75 MG capsule  Commonly known as: ANAFRANIL     clonazePAM 1 MG tablet  Commonly known as: KlonoPIN     gabapentin 300 MG capsule  Commonly known as: NEURONTIN     gabapentin 600 MG tablet  Commonly known as: NEURONTIN     lithium 600 MG capsule     QUEtiapine 200 MG tablet  Commonly known as: SEROquel            No Known Allergies      Discharge Disposition:  Psychiatric Hospital or Unit (DC - External)    Discharge Diet:  Diet Order   Procedures   • Diet Regular; Thin       Discharge Activity:  Activity Instructions     Activity as Tolerated              CODE STATUS:    Code Status and Medical Interventions:   Ordered at: 02/21/21 2046     Code Status:    CPR     Medical Interventions (Level of Support Prior to Arrest):    Full         No future appointments.    Additional Instructions for the Follow-ups that You Need to Schedule     Discharge Follow-up with PCP   As directed       Currently Documented PCP:    Provider, No Known    PCP Phone Number:    None     Follow Up Details: 1 week         Discharge Follow-up with Specified Provider: [facility] [place]   As directed      To: [facility] [place]                 Time Spent on Discharge: I spent 45 minutes on this discharge activity which included: face-to-face encounter with the patient, reviewing the data in the system, coordination of  the care with the nursing staff as well as consultants, documentation, and entering orders.

## 2022-05-13 ENCOUNTER — APPOINTMENT (OUTPATIENT)
Dept: MRI IMAGING | Facility: HOSPITAL | Age: 47
End: 2022-05-13

## 2022-05-13 ENCOUNTER — HOSPITAL ENCOUNTER (INPATIENT)
Facility: HOSPITAL | Age: 47
LOS: 15 days | Discharge: NURSING, REHAB OR LONG-TERM CARE | End: 2022-05-28
Attending: EMERGENCY MEDICINE | Admitting: HOSPITALIST

## 2022-05-13 ENCOUNTER — APPOINTMENT (OUTPATIENT)
Dept: NEUROLOGY | Facility: HOSPITAL | Age: 47
End: 2022-05-13

## 2022-05-13 ENCOUNTER — APPOINTMENT (OUTPATIENT)
Dept: GENERAL RADIOLOGY | Facility: HOSPITAL | Age: 47
End: 2022-05-13

## 2022-05-13 DIAGNOSIS — I10 ELEVATED BLOOD PRESSURE READING WITH DIAGNOSIS OF HYPERTENSION: Primary | ICD-10-CM

## 2022-05-13 DIAGNOSIS — R13.12 OROPHARYNGEAL DYSPHAGIA: ICD-10-CM

## 2022-05-13 DIAGNOSIS — R53.1 GENERALIZED WEAKNESS: ICD-10-CM

## 2022-05-13 DIAGNOSIS — R41.841 COGNITIVE COMMUNICATION DISORDER: ICD-10-CM

## 2022-05-13 LAB
ALBUMIN SERPL-MCNC: 4.8 G/DL (ref 3.5–5.2)
ALBUMIN/GLOB SERPL: 1.5 G/DL
ALP SERPL-CCNC: 87 U/L (ref 39–117)
ALT SERPL W P-5'-P-CCNC: 25 U/L (ref 1–33)
AMPHET+METHAMPHET UR QL: NEGATIVE
AMPHETAMINES UR QL: NEGATIVE
ANION GAP SERPL CALCULATED.3IONS-SCNC: 12 MMOL/L (ref 5–15)
AST SERPL-CCNC: 23 U/L (ref 1–32)
B-HCG UR QL: NEGATIVE
BARBITURATES UR QL SCN: NEGATIVE
BASOPHILS # BLD AUTO: 0.03 10*3/MM3 (ref 0–0.2)
BASOPHILS NFR BLD AUTO: 0.3 % (ref 0–1.5)
BENZODIAZ UR QL SCN: NEGATIVE
BILIRUB SERPL-MCNC: 0.4 MG/DL (ref 0–1.2)
BILIRUB UR QL STRIP: NEGATIVE
BUN SERPL-MCNC: 6 MG/DL (ref 6–20)
BUN/CREAT SERPL: 6.5 (ref 7–25)
BUPRENORPHINE SERPL-MCNC: NEGATIVE NG/ML
CALCIUM SPEC-SCNC: 10.5 MG/DL (ref 8.6–10.5)
CANNABINOIDS SERPL QL: NEGATIVE
CHLORIDE SERPL-SCNC: 102 MMOL/L (ref 98–107)
CK SERPL-CCNC: 259 U/L (ref 20–180)
CLARITY UR: CLEAR
CO2 SERPL-SCNC: 25 MMOL/L (ref 22–29)
COCAINE UR QL: NEGATIVE
COLOR UR: YELLOW
CREAT SERPL-MCNC: 0.93 MG/DL (ref 0.57–1)
CRP SERPL-MCNC: <0.3 MG/DL (ref 0–0.5)
DEPRECATED RDW RBC AUTO: 51.5 FL (ref 37–54)
EGFRCR SERPLBLD CKD-EPI 2021: 75.5 ML/MIN/1.73
EOSINOPHIL # BLD AUTO: 0.07 10*3/MM3 (ref 0–0.4)
EOSINOPHIL NFR BLD AUTO: 0.7 % (ref 0.3–6.2)
ERYTHROCYTE [DISTWIDTH] IN BLOOD BY AUTOMATED COUNT: 16.2 % (ref 12.3–15.4)
ERYTHROCYTE [SEDIMENTATION RATE] IN BLOOD: 16 MM/HR (ref 0–20)
EXPIRATION DATE: NORMAL
GLOBULIN UR ELPH-MCNC: 3.2 GM/DL
GLUCOSE SERPL-MCNC: 144 MG/DL (ref 65–99)
GLUCOSE UR STRIP-MCNC: NEGATIVE MG/DL
HCT VFR BLD AUTO: 44.3 % (ref 34–46.6)
HGB BLD-MCNC: 13.9 G/DL (ref 12–15.9)
HGB UR QL STRIP.AUTO: NEGATIVE
HOLD SPECIMEN: NORMAL
IMM GRANULOCYTES # BLD AUTO: 0.04 10*3/MM3 (ref 0–0.05)
IMM GRANULOCYTES NFR BLD AUTO: 0.4 % (ref 0–0.5)
INTERNAL NEGATIVE CONTROL: NEGATIVE
INTERNAL POSITIVE CONTROL: POSITIVE
KETONES UR QL STRIP: ABNORMAL
LEUKOCYTE ESTERASE UR QL STRIP.AUTO: NEGATIVE
LITHIUM SERPL-SCNC: 0.9 MMOL/L (ref 0.6–1.2)
LYMPHOCYTES # BLD AUTO: 1.2 10*3/MM3 (ref 0.7–3.1)
LYMPHOCYTES NFR BLD AUTO: 12.4 % (ref 19.6–45.3)
Lab: NORMAL
MAGNESIUM SERPL-MCNC: 2.3 MG/DL (ref 1.6–2.6)
MCH RBC QN AUTO: 27.1 PG (ref 26.6–33)
MCHC RBC AUTO-ENTMCNC: 31.4 G/DL (ref 31.5–35.7)
MCV RBC AUTO: 86.4 FL (ref 79–97)
METHADONE UR QL SCN: NEGATIVE
MONOCYTES # BLD AUTO: 0.77 10*3/MM3 (ref 0.1–0.9)
MONOCYTES NFR BLD AUTO: 7.9 % (ref 5–12)
NEUTROPHILS NFR BLD AUTO: 7.6 10*3/MM3 (ref 1.7–7)
NEUTROPHILS NFR BLD AUTO: 78.3 % (ref 42.7–76)
NITRITE UR QL STRIP: NEGATIVE
NRBC BLD AUTO-RTO: 0 /100 WBC (ref 0–0.2)
OPIATES UR QL: NEGATIVE
OXYCODONE UR QL SCN: NEGATIVE
PCP UR QL SCN: NEGATIVE
PH UR STRIP.AUTO: 7.5 [PH] (ref 5–8)
PLATELET # BLD AUTO: 391 10*3/MM3 (ref 140–450)
PMV BLD AUTO: 10.6 FL (ref 6–12)
POTASSIUM SERPL-SCNC: 3.7 MMOL/L (ref 3.5–5.2)
PROPOXYPH UR QL: NEGATIVE
PROT SERPL-MCNC: 8 G/DL (ref 6–8.5)
PROT UR QL STRIP: NEGATIVE
RBC # BLD AUTO: 5.13 10*6/MM3 (ref 3.77–5.28)
SARS-COV-2 RNA PNL SPEC NAA+PROBE: NOT DETECTED
SODIUM SERPL-SCNC: 139 MMOL/L (ref 136–145)
SP GR UR STRIP: 1.01 (ref 1–1.03)
TRICYCLICS UR QL SCN: NEGATIVE
TROPONIN T SERPL-MCNC: <0.01 NG/ML (ref 0–0.03)
TSH SERPL DL<=0.05 MIU/L-ACNC: 0.82 UIU/ML (ref 0.27–4.2)
UROBILINOGEN UR QL STRIP: ABNORMAL
WBC NRBC COR # BLD: 9.71 10*3/MM3 (ref 3.4–10.8)
WHOLE BLOOD HOLD COAG: NORMAL
WHOLE BLOOD HOLD SPECIMEN: NORMAL

## 2022-05-13 PROCEDURE — 99223 1ST HOSP IP/OBS HIGH 75: CPT | Performed by: PSYCHIATRY & NEUROLOGY

## 2022-05-13 PROCEDURE — 70551 MRI BRAIN STEM W/O DYE: CPT

## 2022-05-13 PROCEDURE — 83735 ASSAY OF MAGNESIUM: CPT | Performed by: EMERGENCY MEDICINE

## 2022-05-13 PROCEDURE — 25010000002 ENOXAPARIN PER 10 MG: Performed by: INTERNAL MEDICINE

## 2022-05-13 PROCEDURE — 93005 ELECTROCARDIOGRAM TRACING: CPT

## 2022-05-13 PROCEDURE — 82550 ASSAY OF CK (CPK): CPT | Performed by: EMERGENCY MEDICINE

## 2022-05-13 PROCEDURE — 86038 ANTINUCLEAR ANTIBODIES: CPT | Performed by: PSYCHIATRY & NEUROLOGY

## 2022-05-13 PROCEDURE — 72141 MRI NECK SPINE W/O DYE: CPT

## 2022-05-13 PROCEDURE — 95912 NRV CNDJ TEST 11-12 STUDIES: CPT

## 2022-05-13 PROCEDURE — G0378 HOSPITAL OBSERVATION PER HR: HCPCS

## 2022-05-13 PROCEDURE — 99222 1ST HOSP IP/OBS MODERATE 55: CPT | Performed by: INTERNAL MEDICINE

## 2022-05-13 PROCEDURE — 95886 MUSC TEST DONE W/N TEST COMP: CPT

## 2022-05-13 PROCEDURE — 85652 RBC SED RATE AUTOMATED: CPT | Performed by: PSYCHIATRY & NEUROLOGY

## 2022-05-13 PROCEDURE — 93005 ELECTROCARDIOGRAM TRACING: CPT | Performed by: EMERGENCY MEDICINE

## 2022-05-13 PROCEDURE — 81025 URINE PREGNANCY TEST: CPT | Performed by: EMERGENCY MEDICINE

## 2022-05-13 PROCEDURE — 84484 ASSAY OF TROPONIN QUANT: CPT | Performed by: EMERGENCY MEDICINE

## 2022-05-13 PROCEDURE — 85025 COMPLETE CBC W/AUTO DIFF WBC: CPT | Performed by: EMERGENCY MEDICINE

## 2022-05-13 PROCEDURE — 80306 DRUG TEST PRSMV INSTRMNT: CPT | Performed by: EMERGENCY MEDICINE

## 2022-05-13 PROCEDURE — 72146 MRI CHEST SPINE W/O DYE: CPT

## 2022-05-13 PROCEDURE — 84443 ASSAY THYROID STIM HORMONE: CPT | Performed by: EMERGENCY MEDICINE

## 2022-05-13 PROCEDURE — 97165 OT EVAL LOW COMPLEX 30 MIN: CPT

## 2022-05-13 PROCEDURE — 97535 SELF CARE MNGMENT TRAINING: CPT

## 2022-05-13 PROCEDURE — 86140 C-REACTIVE PROTEIN: CPT | Performed by: PSYCHIATRY & NEUROLOGY

## 2022-05-13 PROCEDURE — 80053 COMPREHEN METABOLIC PANEL: CPT | Performed by: EMERGENCY MEDICINE

## 2022-05-13 PROCEDURE — 99284 EMERGENCY DEPT VISIT MOD MDM: CPT

## 2022-05-13 PROCEDURE — 25010000002 HYDRALAZINE PER 20 MG: Performed by: NURSE PRACTITIONER

## 2022-05-13 PROCEDURE — U0004 COV-19 TEST NON-CDC HGH THRU: HCPCS | Performed by: INTERNAL MEDICINE

## 2022-05-13 PROCEDURE — 81003 URINALYSIS AUTO W/O SCOPE: CPT | Performed by: EMERGENCY MEDICINE

## 2022-05-13 PROCEDURE — 80178 ASSAY OF LITHIUM: CPT | Performed by: EMERGENCY MEDICINE

## 2022-05-13 PROCEDURE — 36415 COLL VENOUS BLD VENIPUNCTURE: CPT

## 2022-05-13 PROCEDURE — 71045 X-RAY EXAM CHEST 1 VIEW: CPT

## 2022-05-13 RX ORDER — CLONAZEPAM 1 MG/1
1 TABLET ORAL 2 TIMES DAILY PRN
Status: DISCONTINUED | OUTPATIENT
Start: 2022-05-13 | End: 2022-05-13

## 2022-05-13 RX ORDER — PAROXETINE HYDROCHLORIDE 20 MG/1
60 TABLET, FILM COATED ORAL DAILY
Status: DISCONTINUED | OUTPATIENT
Start: 2022-05-13 | End: 2022-05-13

## 2022-05-13 RX ORDER — ACETAMINOPHEN 160 MG/5ML
650 SOLUTION ORAL EVERY 4 HOURS PRN
Status: DISCONTINUED | OUTPATIENT
Start: 2022-05-13 | End: 2022-05-19

## 2022-05-13 RX ORDER — POLYETHYLENE GLYCOL 3350 17 G/17G
17 POWDER, FOR SOLUTION ORAL DAILY PRN
Status: DISCONTINUED | OUTPATIENT
Start: 2022-05-13 | End: 2022-05-19

## 2022-05-13 RX ORDER — PERPHENAZINE 2 MG/1
4 TABLET ORAL EVERY 8 HOURS SCHEDULED
Status: DISCONTINUED | OUTPATIENT
Start: 2022-05-13 | End: 2022-05-14

## 2022-05-13 RX ORDER — LITHIUM CARBONATE 450 MG
450 TABLET, EXTENDED RELEASE ORAL DAILY
Status: DISCONTINUED | OUTPATIENT
Start: 2022-05-13 | End: 2022-05-18

## 2022-05-13 RX ORDER — DULOXETIN HYDROCHLORIDE 60 MG/1
60 CAPSULE, DELAYED RELEASE ORAL DAILY
Status: ON HOLD | COMMUNITY
End: 2022-05-14

## 2022-05-13 RX ORDER — ONDANSETRON 4 MG/1
4 TABLET, FILM COATED ORAL EVERY 6 HOURS PRN
Status: DISCONTINUED | OUTPATIENT
Start: 2022-05-13 | End: 2022-05-19

## 2022-05-13 RX ORDER — HYDROCHLOROTHIAZIDE 12.5 MG/1
12.5 CAPSULE, GELATIN COATED ORAL DAILY
Status: DISCONTINUED | OUTPATIENT
Start: 2022-05-13 | End: 2022-05-15

## 2022-05-13 RX ORDER — ACETAMINOPHEN 325 MG/1
650 TABLET ORAL EVERY 4 HOURS PRN
Status: DISCONTINUED | OUTPATIENT
Start: 2022-05-13 | End: 2022-05-19

## 2022-05-13 RX ORDER — ACETAMINOPHEN 650 MG/1
650 SUPPOSITORY RECTAL EVERY 4 HOURS PRN
Status: DISCONTINUED | OUTPATIENT
Start: 2022-05-13 | End: 2022-05-19

## 2022-05-13 RX ORDER — AMOXICILLIN 250 MG
2 CAPSULE ORAL 2 TIMES DAILY
Status: DISCONTINUED | OUTPATIENT
Start: 2022-05-13 | End: 2022-05-19

## 2022-05-13 RX ORDER — ONDANSETRON 2 MG/ML
4 INJECTION INTRAMUSCULAR; INTRAVENOUS EVERY 6 HOURS PRN
Status: DISCONTINUED | OUTPATIENT
Start: 2022-05-13 | End: 2022-05-19

## 2022-05-13 RX ORDER — CHOLECALCIFEROL (VITAMIN D3) 125 MCG
10 CAPSULE ORAL NIGHTLY
Status: DISCONTINUED | OUTPATIENT
Start: 2022-05-13 | End: 2022-05-19

## 2022-05-13 RX ORDER — CLONAZEPAM 1 MG/1
1 TABLET ORAL 2 TIMES DAILY PRN
COMMUNITY

## 2022-05-13 RX ORDER — HYDRALAZINE HYDROCHLORIDE 20 MG/ML
10 INJECTION INTRAMUSCULAR; INTRAVENOUS ONCE
Status: COMPLETED | OUTPATIENT
Start: 2022-05-13 | End: 2022-05-13

## 2022-05-13 RX ORDER — CLONAZEPAM 0.5 MG/1
1 TABLET ORAL EVERY 8 HOURS SCHEDULED
Status: DISCONTINUED | OUTPATIENT
Start: 2022-05-13 | End: 2022-05-18

## 2022-05-13 RX ORDER — BISACODYL 5 MG/1
5 TABLET, DELAYED RELEASE ORAL DAILY PRN
Status: DISCONTINUED | OUTPATIENT
Start: 2022-05-13 | End: 2022-05-19

## 2022-05-13 RX ORDER — ENOXAPARIN SODIUM 100 MG/ML
40 INJECTION SUBCUTANEOUS EVERY 24 HOURS
Status: DISCONTINUED | OUTPATIENT
Start: 2022-05-13 | End: 2022-05-15

## 2022-05-13 RX ORDER — SODIUM CHLORIDE 0.9 % (FLUSH) 0.9 %
10 SYRINGE (ML) INJECTION AS NEEDED
Status: DISCONTINUED | OUTPATIENT
Start: 2022-05-13 | End: 2022-05-16

## 2022-05-13 RX ORDER — CARVEDILOL 12.5 MG/1
12.5 TABLET ORAL 2 TIMES DAILY WITH MEALS
Status: DISCONTINUED | OUTPATIENT
Start: 2022-05-13 | End: 2022-05-16

## 2022-05-13 RX ORDER — PERPHENAZINE 8 MG/1
8 TABLET ORAL 3 TIMES DAILY
COMMUNITY

## 2022-05-13 RX ORDER — LEVOTHYROXINE SODIUM 88 UG/1
88 TABLET ORAL
Status: DISCONTINUED | OUTPATIENT
Start: 2022-05-14 | End: 2022-05-19

## 2022-05-13 RX ORDER — DULOXETIN HYDROCHLORIDE 60 MG/1
60 CAPSULE, DELAYED RELEASE ORAL DAILY
Status: DISCONTINUED | OUTPATIENT
Start: 2022-05-13 | End: 2022-05-28 | Stop reason: HOSPADM

## 2022-05-13 RX ORDER — LITHIUM CARBONATE 300 MG/1
300 TABLET, FILM COATED, EXTENDED RELEASE ORAL
Status: DISCONTINUED | OUTPATIENT
Start: 2022-05-13 | End: 2022-05-18

## 2022-05-13 RX ORDER — CLONAZEPAM 0.5 MG/1
1 TABLET ORAL 2 TIMES DAILY PRN
Status: DISCONTINUED | OUTPATIENT
Start: 2022-05-13 | End: 2022-05-19

## 2022-05-13 RX ORDER — PANTOPRAZOLE SODIUM 40 MG/1
40 TABLET, DELAYED RELEASE ORAL
Status: DISCONTINUED | OUTPATIENT
Start: 2022-05-13 | End: 2022-05-20

## 2022-05-13 RX ORDER — BISACODYL 10 MG
10 SUPPOSITORY, RECTAL RECTAL DAILY PRN
Status: DISCONTINUED | OUTPATIENT
Start: 2022-05-13 | End: 2022-05-19

## 2022-05-13 RX ORDER — SODIUM CHLORIDE 0.9 % (FLUSH) 0.9 %
10 SYRINGE (ML) INJECTION AS NEEDED
Status: DISCONTINUED | OUTPATIENT
Start: 2022-05-13 | End: 2022-05-24

## 2022-05-13 RX ORDER — SODIUM CHLORIDE 0.9 % (FLUSH) 0.9 %
10 SYRINGE (ML) INJECTION EVERY 12 HOURS SCHEDULED
Status: DISCONTINUED | OUTPATIENT
Start: 2022-05-13 | End: 2022-05-24

## 2022-05-13 RX ADMIN — SENNOSIDES AND DOCUSATE SODIUM 2 TABLET: 50; 8.6 TABLET ORAL at 10:19

## 2022-05-13 RX ADMIN — PERPHENAZINE 4 MG: 2 TABLET, FILM COATED ORAL at 13:05

## 2022-05-13 RX ADMIN — Medication 10 ML: at 10:22

## 2022-05-13 RX ADMIN — ENOXAPARIN SODIUM 40 MG: 40 INJECTION SUBCUTANEOUS at 10:19

## 2022-05-13 RX ADMIN — ACETAMINOPHEN 650 MG: 325 TABLET ORAL at 22:41

## 2022-05-13 RX ADMIN — CLONAZEPAM 1 MG: 0.5 TABLET ORAL at 21:33

## 2022-05-13 RX ADMIN — HYDROCHLOROTHIAZIDE 12.5 MG: 12.5 CAPSULE ORAL at 10:19

## 2022-05-13 RX ADMIN — CLONAZEPAM 1 MG: 0.5 TABLET ORAL at 17:17

## 2022-05-13 RX ADMIN — CARVEDILOL 12.5 MG: 12.5 TABLET, FILM COATED ORAL at 10:19

## 2022-05-13 RX ADMIN — PANTOPRAZOLE SODIUM 40 MG: 40 TABLET, DELAYED RELEASE ORAL at 10:19

## 2022-05-13 RX ADMIN — Medication 10 MG: at 21:33

## 2022-05-13 RX ADMIN — Medication 10 ML: at 21:36

## 2022-05-13 RX ADMIN — HYDRALAZINE HYDROCHLORIDE 10 MG: 20 INJECTION INTRAMUSCULAR; INTRAVENOUS at 22:19

## 2022-05-13 RX ADMIN — CLONAZEPAM 1 MG: 0.5 TABLET ORAL at 13:32

## 2022-05-13 RX ADMIN — PERPHENAZINE 4 MG: 2 TABLET, FILM COATED ORAL at 21:32

## 2022-05-13 RX ADMIN — LITHIUM CARBONATE 300 MG: 300 TABLET, FILM COATED, EXTENDED RELEASE ORAL at 17:18

## 2022-05-13 RX ADMIN — LITHIUM CARBONATE 450 MG: 450 TABLET, EXTENDED RELEASE ORAL at 13:06

## 2022-05-13 RX ADMIN — PANTOPRAZOLE SODIUM 40 MG: 40 TABLET, DELAYED RELEASE ORAL at 17:17

## 2022-05-13 RX ADMIN — CARVEDILOL 12.5 MG: 12.5 TABLET, FILM COATED ORAL at 17:17

## 2022-05-13 RX ADMIN — SENNOSIDES AND DOCUSATE SODIUM 2 TABLET: 50; 8.6 TABLET ORAL at 21:33

## 2022-05-13 RX ADMIN — DULOXETINE HYDROCHLORIDE 60 MG: 60 CAPSULE, DELAYED RELEASE ORAL at 13:06

## 2022-05-14 LAB
ALBUMIN SERPL-MCNC: 4.3 G/DL (ref 3.5–5.2)
ALBUMIN/GLOB SERPL: 1.4 G/DL
ALP SERPL-CCNC: 75 U/L (ref 39–117)
ALT SERPL W P-5'-P-CCNC: 21 U/L (ref 1–33)
ANION GAP SERPL CALCULATED.3IONS-SCNC: 13 MMOL/L (ref 5–15)
AST SERPL-CCNC: 21 U/L (ref 1–32)
BILIRUB SERPL-MCNC: 0.4 MG/DL (ref 0–1.2)
BUN SERPL-MCNC: 10 MG/DL (ref 6–20)
BUN/CREAT SERPL: 12.3 (ref 7–25)
CALCIUM SPEC-SCNC: 9.9 MG/DL (ref 8.6–10.5)
CHLORIDE SERPL-SCNC: 97 MMOL/L (ref 98–107)
CO2 SERPL-SCNC: 23 MMOL/L (ref 22–29)
CREAT SERPL-MCNC: 0.81 MG/DL (ref 0.57–1)
DEPRECATED RDW RBC AUTO: 47.5 FL (ref 37–54)
EGFRCR SERPLBLD CKD-EPI 2021: 89.1 ML/MIN/1.73
ERYTHROCYTE [DISTWIDTH] IN BLOOD BY AUTOMATED COUNT: 15.6 % (ref 12.3–15.4)
GLOBULIN UR ELPH-MCNC: 3 GM/DL
GLUCOSE BLDC GLUCOMTR-MCNC: 114 MG/DL (ref 70–130)
GLUCOSE SERPL-MCNC: 164 MG/DL (ref 65–99)
HCT VFR BLD AUTO: 42.2 % (ref 34–46.6)
HGB BLD-MCNC: 13.9 G/DL (ref 12–15.9)
INR PPP: 1.02 (ref 0.84–1.13)
LITHIUM SERPL-SCNC: 1.1 MMOL/L (ref 0.6–1.2)
MAGNESIUM SERPL-MCNC: 2.1 MG/DL (ref 1.6–2.6)
MCH RBC QN AUTO: 27.1 PG (ref 26.6–33)
MCHC RBC AUTO-ENTMCNC: 32.9 G/DL (ref 31.5–35.7)
MCV RBC AUTO: 82.4 FL (ref 79–97)
PLATELET # BLD AUTO: 418 10*3/MM3 (ref 140–450)
PMV BLD AUTO: 10.7 FL (ref 6–12)
POTASSIUM SERPL-SCNC: 3.2 MMOL/L (ref 3.5–5.2)
PROT SERPL-MCNC: 7.3 G/DL (ref 6–8.5)
PROTHROMBIN TIME: 13.3 SECONDS (ref 11.4–14.4)
QT INTERVAL: 360 MS
QTC INTERVAL: 452 MS
RBC # BLD AUTO: 5.12 10*6/MM3 (ref 3.77–5.28)
SODIUM SERPL-SCNC: 133 MMOL/L (ref 136–145)
VIT B12 BLD-MCNC: 568 PG/ML (ref 211–946)
WBC NRBC COR # BLD: 8.71 10*3/MM3 (ref 3.4–10.8)

## 2022-05-14 PROCEDURE — G0378 HOSPITAL OBSERVATION PER HR: HCPCS

## 2022-05-14 PROCEDURE — 82607 VITAMIN B-12: CPT | Performed by: PSYCHIATRY & NEUROLOGY

## 2022-05-14 PROCEDURE — 80053 COMPREHEN METABOLIC PANEL: CPT | Performed by: INTERNAL MEDICINE

## 2022-05-14 PROCEDURE — 83735 ASSAY OF MAGNESIUM: CPT | Performed by: INTERNAL MEDICINE

## 2022-05-14 PROCEDURE — 99232 SBSQ HOSP IP/OBS MODERATE 35: CPT | Performed by: PSYCHIATRY & NEUROLOGY

## 2022-05-14 PROCEDURE — 85610 PROTHROMBIN TIME: CPT | Performed by: INTERNAL MEDICINE

## 2022-05-14 PROCEDURE — 25010000002 ENOXAPARIN PER 10 MG: Performed by: INTERNAL MEDICINE

## 2022-05-14 PROCEDURE — 82525 ASSAY OF COPPER: CPT | Performed by: PSYCHIATRY & NEUROLOGY

## 2022-05-14 PROCEDURE — 85027 COMPLETE CBC AUTOMATED: CPT | Performed by: INTERNAL MEDICINE

## 2022-05-14 PROCEDURE — 99233 SBSQ HOSP IP/OBS HIGH 50: CPT | Performed by: INTERNAL MEDICINE

## 2022-05-14 PROCEDURE — 84446 ASSAY OF VITAMIN E: CPT | Performed by: PSYCHIATRY & NEUROLOGY

## 2022-05-14 PROCEDURE — 82962 GLUCOSE BLOOD TEST: CPT

## 2022-05-14 PROCEDURE — 97162 PT EVAL MOD COMPLEX 30 MIN: CPT

## 2022-05-14 PROCEDURE — 80178 ASSAY OF LITHIUM: CPT | Performed by: INTERNAL MEDICINE

## 2022-05-14 RX ORDER — CYCLOBENZAPRINE HCL 10 MG
5 TABLET ORAL ONCE
Status: COMPLETED | OUTPATIENT
Start: 2022-05-14 | End: 2022-05-14

## 2022-05-14 RX ORDER — MAGNESIUM SULFATE HEPTAHYDRATE 40 MG/ML
2 INJECTION, SOLUTION INTRAVENOUS AS NEEDED
Status: DISCONTINUED | OUTPATIENT
Start: 2022-05-14 | End: 2022-05-28 | Stop reason: HOSPADM

## 2022-05-14 RX ORDER — HYDRALAZINE HYDROCHLORIDE 10 MG/1
10 TABLET, FILM COATED ORAL EVERY 6 HOURS PRN
Status: DISCONTINUED | OUTPATIENT
Start: 2022-05-14 | End: 2022-05-19

## 2022-05-14 RX ORDER — CARBIDOPA/LEVODOPA 25MG-250MG
0.5 TABLET ORAL 3 TIMES DAILY
Status: DISCONTINUED | OUTPATIENT
Start: 2022-05-14 | End: 2022-05-18

## 2022-05-14 RX ORDER — POTASSIUM CHLORIDE 7.45 MG/ML
10 INJECTION INTRAVENOUS
Status: DISCONTINUED | OUTPATIENT
Start: 2022-05-14 | End: 2022-05-19

## 2022-05-14 RX ORDER — MAGNESIUM SULFATE 1 G/100ML
1 INJECTION INTRAVENOUS AS NEEDED
Status: DISCONTINUED | OUTPATIENT
Start: 2022-05-14 | End: 2022-05-28 | Stop reason: HOSPADM

## 2022-05-14 RX ORDER — MIRTAZAPINE 30 MG/1
30 TABLET, FILM COATED ORAL NIGHTLY
COMMUNITY

## 2022-05-14 RX ORDER — AMLODIPINE BESYLATE 5 MG/1
5 TABLET ORAL
Status: DISCONTINUED | OUTPATIENT
Start: 2022-05-14 | End: 2022-05-15

## 2022-05-14 RX ORDER — CARBIDOPA/LEVODOPA 25MG-250MG
1 TABLET ORAL 3 TIMES DAILY
Status: DISCONTINUED | OUTPATIENT
Start: 2022-05-14 | End: 2022-05-14

## 2022-05-14 RX ORDER — FLUVOXAMINE MALEATE 50 MG/1
50 TABLET, COATED ORAL NIGHTLY
COMMUNITY
End: 2022-05-28 | Stop reason: HOSPADM

## 2022-05-14 RX ORDER — MAGNESIUM SULFATE HEPTAHYDRATE 40 MG/ML
4 INJECTION, SOLUTION INTRAVENOUS AS NEEDED
Status: DISCONTINUED | OUTPATIENT
Start: 2022-05-14 | End: 2022-05-28 | Stop reason: HOSPADM

## 2022-05-14 RX ORDER — POTASSIUM CHLORIDE 750 MG/1
40 CAPSULE, EXTENDED RELEASE ORAL AS NEEDED
Status: DISCONTINUED | OUTPATIENT
Start: 2022-05-14 | End: 2022-05-19

## 2022-05-14 RX ORDER — POTASSIUM CHLORIDE 1.5 G/1.77G
40 POWDER, FOR SOLUTION ORAL AS NEEDED
Status: DISCONTINUED | OUTPATIENT
Start: 2022-05-14 | End: 2022-05-19

## 2022-05-14 RX ADMIN — CYCLOBENZAPRINE 5 MG: 10 TABLET, FILM COATED ORAL at 03:56

## 2022-05-14 RX ADMIN — CLONAZEPAM 1 MG: 0.5 TABLET ORAL at 13:03

## 2022-05-14 RX ADMIN — THIAMINE HCL TAB 100 MG 100 MG: 100 TAB at 13:03

## 2022-05-14 RX ADMIN — ACETAMINOPHEN 650 MG: 325 TABLET ORAL at 05:19

## 2022-05-14 RX ADMIN — LEVOTHYROXINE SODIUM 88 MCG: 88 TABLET ORAL at 05:19

## 2022-05-14 RX ADMIN — LITHIUM CARBONATE 300 MG: 300 TABLET, FILM COATED, EXTENDED RELEASE ORAL at 17:50

## 2022-05-14 RX ADMIN — ACETAMINOPHEN 650 MG: 325 TABLET ORAL at 13:03

## 2022-05-14 RX ADMIN — CARBIDOPA AND LEVODOPA 0.5 TABLET: 25; 250 TABLET ORAL at 21:43

## 2022-05-14 RX ADMIN — PANTOPRAZOLE SODIUM 40 MG: 40 TABLET, DELAYED RELEASE ORAL at 17:37

## 2022-05-14 RX ADMIN — CARBIDOPA AND LEVODOPA 0.5 TABLET: 25; 250 TABLET ORAL at 17:38

## 2022-05-14 RX ADMIN — HYDRALAZINE HYDROCHLORIDE 10 MG: 10 TABLET ORAL at 21:44

## 2022-05-14 RX ADMIN — PERPHENAZINE 4 MG: 2 TABLET, FILM COATED ORAL at 05:19

## 2022-05-14 RX ADMIN — HYDROCHLOROTHIAZIDE 12.5 MG: 12.5 CAPSULE ORAL at 08:15

## 2022-05-14 RX ADMIN — PANTOPRAZOLE SODIUM 40 MG: 40 TABLET, DELAYED RELEASE ORAL at 08:15

## 2022-05-14 RX ADMIN — CARVEDILOL 12.5 MG: 12.5 TABLET, FILM COATED ORAL at 08:15

## 2022-05-14 RX ADMIN — Medication 10 ML: at 21:44

## 2022-05-14 RX ADMIN — CLONAZEPAM 1 MG: 0.5 TABLET ORAL at 05:19

## 2022-05-14 RX ADMIN — CLONAZEPAM 1 MG: 0.5 TABLET ORAL at 21:43

## 2022-05-14 RX ADMIN — Medication 10 ML: at 08:15

## 2022-05-14 RX ADMIN — ENOXAPARIN SODIUM 40 MG: 40 INJECTION SUBCUTANEOUS at 11:10

## 2022-05-14 RX ADMIN — DULOXETINE HYDROCHLORIDE 60 MG: 60 CAPSULE, DELAYED RELEASE ORAL at 08:15

## 2022-05-14 RX ADMIN — Medication 10 MG: at 21:43

## 2022-05-14 RX ADMIN — LITHIUM CARBONATE 450 MG: 450 TABLET, EXTENDED RELEASE ORAL at 08:15

## 2022-05-14 RX ADMIN — SENNOSIDES AND DOCUSATE SODIUM 2 TABLET: 50; 8.6 TABLET ORAL at 21:43

## 2022-05-14 RX ADMIN — SENNOSIDES AND DOCUSATE SODIUM 2 TABLET: 50; 8.6 TABLET ORAL at 08:15

## 2022-05-14 RX ADMIN — AMLODIPINE BESYLATE 5 MG: 5 TABLET ORAL at 17:38

## 2022-05-14 RX ADMIN — CARVEDILOL 12.5 MG: 12.5 TABLET, FILM COATED ORAL at 17:38

## 2022-05-15 LAB
ANION GAP SERPL CALCULATED.3IONS-SCNC: 15 MMOL/L (ref 5–15)
BACTERIA UR QL AUTO: ABNORMAL /HPF
BILIRUB UR QL STRIP: NEGATIVE
BUN SERPL-MCNC: 13 MG/DL (ref 6–20)
BUN/CREAT SERPL: 17.8 (ref 7–25)
CALCIUM SPEC-SCNC: 10.3 MG/DL (ref 8.6–10.5)
CHLORIDE SERPL-SCNC: 96 MMOL/L (ref 98–107)
CLARITY UR: ABNORMAL
CO2 SERPL-SCNC: 22 MMOL/L (ref 22–29)
COLOR UR: YELLOW
CREAT SERPL-MCNC: 0.73 MG/DL (ref 0.57–1)
DEPRECATED RDW RBC AUTO: 47.5 FL (ref 37–54)
EGFRCR SERPLBLD CKD-EPI 2021: 101 ML/MIN/1.73
ERYTHROCYTE [DISTWIDTH] IN BLOOD BY AUTOMATED COUNT: 15.9 % (ref 12.3–15.4)
GLUCOSE SERPL-MCNC: 115 MG/DL (ref 65–99)
GLUCOSE UR STRIP-MCNC: NEGATIVE MG/DL
HCT VFR BLD AUTO: 43.2 % (ref 34–46.6)
HGB BLD-MCNC: 14.3 G/DL (ref 12–15.9)
HGB UR QL STRIP.AUTO: NEGATIVE
HYALINE CASTS UR QL AUTO: ABNORMAL /LPF
KETONES UR QL STRIP: ABNORMAL
LEUKOCYTE ESTERASE UR QL STRIP.AUTO: ABNORMAL
LITHIUM SERPL-SCNC: 1.4 MMOL/L (ref 0.6–1.2)
MAGNESIUM SERPL-MCNC: 2.1 MG/DL (ref 1.6–2.6)
MCH RBC QN AUTO: 27.1 PG (ref 26.6–33)
MCHC RBC AUTO-ENTMCNC: 33.1 G/DL (ref 31.5–35.7)
MCV RBC AUTO: 82 FL (ref 79–97)
NITRITE UR QL STRIP: NEGATIVE
PH UR STRIP.AUTO: 6 [PH] (ref 5–8)
PHOSPHATE SERPL-MCNC: 4.6 MG/DL (ref 2.5–4.5)
PLATELET # BLD AUTO: 387 10*3/MM3 (ref 140–450)
PMV BLD AUTO: 11.6 FL (ref 6–12)
POTASSIUM SERPL-SCNC: 3.4 MMOL/L (ref 3.5–5.2)
PROT UR QL STRIP: NEGATIVE
RBC # BLD AUTO: 5.27 10*6/MM3 (ref 3.77–5.28)
RBC # UR STRIP: ABNORMAL /HPF
REF LAB TEST METHOD: ABNORMAL
SODIUM SERPL-SCNC: 133 MMOL/L (ref 136–145)
SP GR UR STRIP: 1.02 (ref 1–1.03)
SQUAMOUS #/AREA URNS HPF: ABNORMAL /HPF
UROBILINOGEN UR QL STRIP: ABNORMAL
WBC # UR STRIP: ABNORMAL /HPF
WBC NRBC COR # BLD: 10.2 10*3/MM3 (ref 3.4–10.8)

## 2022-05-15 PROCEDURE — 80048 BASIC METABOLIC PNL TOTAL CA: CPT | Performed by: INTERNAL MEDICINE

## 2022-05-15 PROCEDURE — 86341 ISLET CELL ANTIBODY: CPT | Performed by: PSYCHIATRY & NEUROLOGY

## 2022-05-15 PROCEDURE — 86255 FLUORESCENT ANTIBODY SCREEN: CPT | Performed by: PSYCHIATRY & NEUROLOGY

## 2022-05-15 PROCEDURE — 87086 URINE CULTURE/COLONY COUNT: CPT | Performed by: STUDENT IN AN ORGANIZED HEALTH CARE EDUCATION/TRAINING PROGRAM

## 2022-05-15 PROCEDURE — 83735 ASSAY OF MAGNESIUM: CPT | Performed by: INTERNAL MEDICINE

## 2022-05-15 PROCEDURE — G0378 HOSPITAL OBSERVATION PER HR: HCPCS

## 2022-05-15 PROCEDURE — 85027 COMPLETE CBC AUTOMATED: CPT | Performed by: INTERNAL MEDICINE

## 2022-05-15 PROCEDURE — 99232 SBSQ HOSP IP/OBS MODERATE 35: CPT | Performed by: PSYCHIATRY & NEUROLOGY

## 2022-05-15 PROCEDURE — 84100 ASSAY OF PHOSPHORUS: CPT | Performed by: INTERNAL MEDICINE

## 2022-05-15 PROCEDURE — 81001 URINALYSIS AUTO W/SCOPE: CPT | Performed by: STUDENT IN AN ORGANIZED HEALTH CARE EDUCATION/TRAINING PROGRAM

## 2022-05-15 PROCEDURE — 99232 SBSQ HOSP IP/OBS MODERATE 35: CPT | Performed by: STUDENT IN AN ORGANIZED HEALTH CARE EDUCATION/TRAINING PROGRAM

## 2022-05-15 PROCEDURE — 80178 ASSAY OF LITHIUM: CPT

## 2022-05-15 RX ORDER — LABETALOL HYDROCHLORIDE 5 MG/ML
10 INJECTION, SOLUTION INTRAVENOUS ONCE
Status: COMPLETED | OUTPATIENT
Start: 2022-05-15 | End: 2022-05-15

## 2022-05-15 RX ORDER — HYDROCODONE BITARTRATE AND ACETAMINOPHEN 5; 325 MG/1; MG/1
1 TABLET ORAL EVERY 6 HOURS PRN
Status: DISCONTINUED | OUTPATIENT
Start: 2022-05-15 | End: 2022-05-19

## 2022-05-15 RX ORDER — GABAPENTIN 100 MG/1
100 CAPSULE ORAL 3 TIMES DAILY
Status: DISCONTINUED | OUTPATIENT
Start: 2022-05-15 | End: 2022-05-15

## 2022-05-15 RX ORDER — TEMAZEPAM 15 MG/1
15 CAPSULE ORAL NIGHTLY PRN
Status: DISPENSED | OUTPATIENT
Start: 2022-05-15 | End: 2022-05-17

## 2022-05-15 RX ORDER — AMLODIPINE BESYLATE 10 MG/1
10 TABLET ORAL
Status: DISCONTINUED | OUTPATIENT
Start: 2022-05-16 | End: 2022-05-19

## 2022-05-15 RX ORDER — LITHIUM CARBONATE 450 MG
450 TABLET, EXTENDED RELEASE ORAL 2 TIMES DAILY
COMMUNITY
End: 2022-05-28 | Stop reason: HOSPADM

## 2022-05-15 RX ORDER — HYDROCODONE BITARTRATE AND ACETAMINOPHEN 5; 325 MG/1; MG/1
1 TABLET ORAL ONCE AS NEEDED
Status: COMPLETED | OUTPATIENT
Start: 2022-05-15 | End: 2022-05-15

## 2022-05-15 RX ADMIN — CARVEDILOL 12.5 MG: 12.5 TABLET, FILM COATED ORAL at 09:12

## 2022-05-15 RX ADMIN — PANTOPRAZOLE SODIUM 40 MG: 40 TABLET, DELAYED RELEASE ORAL at 05:19

## 2022-05-15 RX ADMIN — HYDROCODONE BITARTRATE AND ACETAMINOPHEN 1 TABLET: 5; 325 TABLET ORAL at 16:45

## 2022-05-15 RX ADMIN — Medication 10 ML: at 21:43

## 2022-05-15 RX ADMIN — LEVOTHYROXINE SODIUM 88 MCG: 88 TABLET ORAL at 05:19

## 2022-05-15 RX ADMIN — DULOXETINE HYDROCHLORIDE 60 MG: 60 CAPSULE, DELAYED RELEASE ORAL at 09:13

## 2022-05-15 RX ADMIN — CARBIDOPA AND LEVODOPA 0.5 TABLET: 25; 250 TABLET ORAL at 21:42

## 2022-05-15 RX ADMIN — LITHIUM CARBONATE 450 MG: 450 TABLET, EXTENDED RELEASE ORAL at 09:14

## 2022-05-15 RX ADMIN — HYDROCHLOROTHIAZIDE 12.5 MG: 12.5 CAPSULE ORAL at 09:12

## 2022-05-15 RX ADMIN — LABETALOL 20 MG/4 ML (5 MG/ML) INTRAVENOUS SYRINGE 10 MG: at 04:36

## 2022-05-15 RX ADMIN — CLONAZEPAM 1 MG: 0.5 TABLET ORAL at 09:12

## 2022-05-15 RX ADMIN — THIAMINE HCL TAB 100 MG 100 MG: 100 TAB at 09:13

## 2022-05-15 RX ADMIN — HYDRALAZINE HYDROCHLORIDE 10 MG: 10 TABLET ORAL at 09:12

## 2022-05-15 RX ADMIN — CLONAZEPAM 1 MG: 0.5 TABLET ORAL at 14:47

## 2022-05-15 RX ADMIN — CLONAZEPAM 1 MG: 0.5 TABLET ORAL at 21:43

## 2022-05-15 RX ADMIN — CLONAZEPAM 1 MG: 0.5 TABLET ORAL at 05:18

## 2022-05-15 RX ADMIN — CARBIDOPA AND LEVODOPA 0.5 TABLET: 25; 250 TABLET ORAL at 14:47

## 2022-05-15 RX ADMIN — SENNOSIDES AND DOCUSATE SODIUM 2 TABLET: 50; 8.6 TABLET ORAL at 21:42

## 2022-05-15 RX ADMIN — ACETAMINOPHEN 650 MG: 325 TABLET ORAL at 09:12

## 2022-05-15 RX ADMIN — Medication 10 MG: at 21:43

## 2022-05-15 RX ADMIN — AMLODIPINE BESYLATE 5 MG: 5 TABLET ORAL at 09:13

## 2022-05-15 RX ADMIN — TEMAZEPAM 15 MG: 15 CAPSULE ORAL at 21:46

## 2022-05-15 RX ADMIN — LITHIUM CARBONATE 300 MG: 300 TABLET, FILM COATED, EXTENDED RELEASE ORAL at 16:46

## 2022-05-15 RX ADMIN — CARVEDILOL 12.5 MG: 12.5 TABLET, FILM COATED ORAL at 16:45

## 2022-05-15 RX ADMIN — CARBIDOPA AND LEVODOPA 0.5 TABLET: 25; 250 TABLET ORAL at 09:13

## 2022-05-15 RX ADMIN — PANTOPRAZOLE SODIUM 40 MG: 40 TABLET, DELAYED RELEASE ORAL at 16:45

## 2022-05-15 RX ADMIN — Medication 10 ML: at 09:14

## 2022-05-15 RX ADMIN — HYDROCODONE BITARTRATE AND ACETAMINOPHEN 1 TABLET: 5; 325 TABLET ORAL at 02:49

## 2022-05-15 RX ADMIN — GABAPENTIN 100 MG: 100 CAPSULE ORAL at 14:47

## 2022-05-15 RX ADMIN — HYDRALAZINE HYDROCHLORIDE 10 MG: 10 TABLET ORAL at 15:40

## 2022-05-15 RX ADMIN — SENNOSIDES AND DOCUSATE SODIUM 2 TABLET: 50; 8.6 TABLET ORAL at 09:12

## 2022-05-16 ENCOUNTER — APPOINTMENT (OUTPATIENT)
Dept: GENERAL RADIOLOGY | Facility: HOSPITAL | Age: 47
End: 2022-05-16

## 2022-05-16 ENCOUNTER — APPOINTMENT (OUTPATIENT)
Dept: MRI IMAGING | Facility: HOSPITAL | Age: 47
End: 2022-05-16

## 2022-05-16 LAB
ANA SER QL: NEGATIVE
ANION GAP SERPL CALCULATED.3IONS-SCNC: 13 MMOL/L (ref 5–15)
BACTERIA SPEC AEROBE CULT: NORMAL
BUN SERPL-MCNC: 13 MG/DL (ref 6–20)
BUN/CREAT SERPL: 18.6 (ref 7–25)
CALCIUM SPEC-SCNC: 10.3 MG/DL (ref 8.6–10.5)
CHLORIDE SERPL-SCNC: 95 MMOL/L (ref 98–107)
CO2 SERPL-SCNC: 25 MMOL/L (ref 22–29)
CREAT SERPL-MCNC: 0.7 MG/DL (ref 0.57–1)
EGFRCR SERPLBLD CKD-EPI 2021: 106.2 ML/MIN/1.73
GLUCOSE SERPL-MCNC: 90 MG/DL (ref 65–99)
POTASSIUM SERPL-SCNC: 3.2 MMOL/L (ref 3.5–5.2)
SODIUM SERPL-SCNC: 133 MMOL/L (ref 136–145)

## 2022-05-16 PROCEDURE — 80048 BASIC METABOLIC PNL TOTAL CA: CPT | Performed by: STUDENT IN AN ORGANIZED HEALTH CARE EDUCATION/TRAINING PROGRAM

## 2022-05-16 PROCEDURE — 99232 SBSQ HOSP IP/OBS MODERATE 35: CPT | Performed by: STUDENT IN AN ORGANIZED HEALTH CARE EDUCATION/TRAINING PROGRAM

## 2022-05-16 PROCEDURE — 99232 SBSQ HOSP IP/OBS MODERATE 35: CPT | Performed by: PSYCHIATRY & NEUROLOGY

## 2022-05-16 PROCEDURE — 99222 1ST HOSP IP/OBS MODERATE 55: CPT | Performed by: PHYSICIAN ASSISTANT

## 2022-05-16 PROCEDURE — 72157 MRI CHEST SPINE W/O & W/DYE: CPT

## 2022-05-16 PROCEDURE — 0 GADOBENATE DIMEGLUMINE 529 MG/ML SOLUTION: Performed by: STUDENT IN AN ORGANIZED HEALTH CARE EDUCATION/TRAINING PROGRAM

## 2022-05-16 PROCEDURE — 72148 MRI LUMBAR SPINE W/O DYE: CPT

## 2022-05-16 PROCEDURE — A9577 INJ MULTIHANCE: HCPCS | Performed by: STUDENT IN AN ORGANIZED HEALTH CARE EDUCATION/TRAINING PROGRAM

## 2022-05-16 PROCEDURE — 63710000001 ONDANSETRON PER 8 MG: Performed by: INTERNAL MEDICINE

## 2022-05-16 RX ORDER — CARVEDILOL 12.5 MG/1
25 TABLET ORAL 2 TIMES DAILY WITH MEALS
Status: DISCONTINUED | OUTPATIENT
Start: 2022-05-16 | End: 2022-05-18

## 2022-05-16 RX ORDER — PERPHENAZINE 2 MG/1
4 TABLET ORAL EVERY 8 HOURS SCHEDULED
Status: DISCONTINUED | OUTPATIENT
Start: 2022-05-16 | End: 2022-05-19

## 2022-05-16 RX ORDER — CLONAZEPAM 1 MG/1
1 TABLET ORAL ONCE
Status: DISCONTINUED | OUTPATIENT
Start: 2022-05-16 | End: 2022-05-17

## 2022-05-16 RX ADMIN — CARBIDOPA AND LEVODOPA 0.5 TABLET: 25; 250 TABLET ORAL at 16:59

## 2022-05-16 RX ADMIN — Medication 10 ML: at 08:18

## 2022-05-16 RX ADMIN — CARVEDILOL 25 MG: 12.5 TABLET, FILM COATED ORAL at 17:01

## 2022-05-16 RX ADMIN — HYDROCODONE BITARTRATE AND ACETAMINOPHEN 1 TABLET: 5; 325 TABLET ORAL at 02:24

## 2022-05-16 RX ADMIN — LEVOTHYROXINE SODIUM 88 MCG: 88 TABLET ORAL at 05:35

## 2022-05-16 RX ADMIN — HYDRALAZINE HYDROCHLORIDE 10 MG: 10 TABLET ORAL at 10:58

## 2022-05-16 RX ADMIN — AMLODIPINE BESYLATE 10 MG: 10 TABLET ORAL at 08:16

## 2022-05-16 RX ADMIN — GADOBENATE DIMEGLUMINE 17 ML: 529 INJECTION, SOLUTION INTRAVENOUS at 22:41

## 2022-05-16 RX ADMIN — HYDRALAZINE HYDROCHLORIDE 10 MG: 10 TABLET ORAL at 23:22

## 2022-05-16 RX ADMIN — HYDROCODONE BITARTRATE AND ACETAMINOPHEN 1 TABLET: 5; 325 TABLET ORAL at 17:00

## 2022-05-16 RX ADMIN — CARBIDOPA AND LEVODOPA 0.5 TABLET: 25; 250 TABLET ORAL at 08:16

## 2022-05-16 RX ADMIN — PERPHENAZINE 4 MG: 2 TABLET, FILM COATED ORAL at 13:03

## 2022-05-16 RX ADMIN — CARVEDILOL 12.5 MG: 12.5 TABLET, FILM COATED ORAL at 08:17

## 2022-05-16 RX ADMIN — HYDROCODONE BITARTRATE AND ACETAMINOPHEN 1 TABLET: 5; 325 TABLET ORAL at 09:59

## 2022-05-16 RX ADMIN — LITHIUM CARBONATE 300 MG: 300 TABLET, FILM COATED, EXTENDED RELEASE ORAL at 17:01

## 2022-05-16 RX ADMIN — LITHIUM CARBONATE 450 MG: 450 TABLET, EXTENDED RELEASE ORAL at 08:17

## 2022-05-16 RX ADMIN — CLONAZEPAM 1 MG: 0.5 TABLET ORAL at 13:03

## 2022-05-16 RX ADMIN — HYDRALAZINE HYDROCHLORIDE 10 MG: 10 TABLET ORAL at 17:01

## 2022-05-16 RX ADMIN — TEMAZEPAM 15 MG: 15 CAPSULE ORAL at 23:22

## 2022-05-16 RX ADMIN — PANTOPRAZOLE SODIUM 40 MG: 40 TABLET, DELAYED RELEASE ORAL at 08:17

## 2022-05-16 RX ADMIN — PERPHENAZINE 4 MG: 2 TABLET, FILM COATED ORAL at 20:42

## 2022-05-16 RX ADMIN — SENNOSIDES AND DOCUSATE SODIUM 2 TABLET: 50; 8.6 TABLET ORAL at 20:42

## 2022-05-16 RX ADMIN — THIAMINE HCL TAB 100 MG 100 MG: 100 TAB at 08:17

## 2022-05-16 RX ADMIN — DULOXETINE HYDROCHLORIDE 60 MG: 60 CAPSULE, DELAYED RELEASE ORAL at 08:17

## 2022-05-16 RX ADMIN — Medication 10 MG: at 20:42

## 2022-05-16 RX ADMIN — PANTOPRAZOLE SODIUM 40 MG: 40 TABLET, DELAYED RELEASE ORAL at 17:01

## 2022-05-16 RX ADMIN — HYDROCODONE BITARTRATE AND ACETAMINOPHEN 1 TABLET: 5; 325 TABLET ORAL at 23:13

## 2022-05-16 RX ADMIN — CARBIDOPA AND LEVODOPA 0.5 TABLET: 25; 250 TABLET ORAL at 20:41

## 2022-05-16 RX ADMIN — Medication 10 ML: at 20:43

## 2022-05-16 RX ADMIN — ONDANSETRON HYDROCHLORIDE 4 MG: 4 TABLET, FILM COATED ORAL at 23:13

## 2022-05-16 RX ADMIN — CLONAZEPAM 1 MG: 0.5 TABLET ORAL at 20:42

## 2022-05-16 RX ADMIN — CLONAZEPAM 1 MG: 0.5 TABLET ORAL at 05:35

## 2022-05-16 RX ADMIN — SENNOSIDES AND DOCUSATE SODIUM 2 TABLET: 50; 8.6 TABLET ORAL at 08:16

## 2022-05-17 LAB
APPEARANCE CSF: CLEAR
APPEARANCE CSF: CLEAR
C GATTII+NEOFOR DNA CSF QL NAA+NON-PROBE: NOT DETECTED
CERULOPLASMIN SERPL-MCNC: 28 MG/DL (ref 19–39)
CMV DNA CSF QL NAA+PROBE: NOT DETECTED
COLOR CSF: COLORLESS
COLOR CSF: COLORLESS
COLOR SPUN CSF: COLORLESS
COLOR SPUN CSF: COLORLESS
E COLI K1 DNA CSF QL NAA+NON-PROBE: NOT DETECTED
EV RNA CSF QL NAA+PROBE: NOT DETECTED
GLUCOSE CSF-MCNC: 91 MG/DL (ref 40–70)
GP B STREP DNA SPEC QL NAA+PROBE: NOT DETECTED
HAEM INFLU SEROTYP DNA SPEC NAA+PROBE: NOT DETECTED
HHV6 DNA CSF QL NAA+PROBE: NOT DETECTED
HSV1 DNA CSF QL NAA+PROBE: NOT DETECTED
HSV2 DNA CSF QL NAA+PROBE: DETECTED
L MONOCYTOG RRNA SPEC QL PROBE: NOT DETECTED
LITHIUM SERPL-SCNC: 1.3 MMOL/L (ref 0.6–1.2)
N MEN DNA SPEC QL NAA+PROBE: NOT DETECTED
PARECHOVIRUS A RNA CSF QL NAA+NON-PROBE: NOT DETECTED
PROT CSF-MCNC: 43.1 MG/DL (ref 15–45)
RBC # CSF MANUAL: 0 /MM3 (ref 0–5)
RBC # CSF MANUAL: 0 /MM3 (ref 0–5)
S PNEUM DNA CSF QL NAA+NON-PROBE: NOT DETECTED
SPECIMEN VOL CSF: 7 ML
SPECIMEN VOL CSF: 7 ML
TUBE # CSF: 1
TUBE # CSF: 4
VZV DNA CSF QL NAA+PROBE: NOT DETECTED
WBC # CSF MANUAL: 1 /MM3 (ref 0–5)
WBC # CSF MANUAL: 1 /MM3 (ref 0–5)

## 2022-05-17 PROCEDURE — 86592 SYPHILIS TEST NON-TREP QUAL: CPT | Performed by: PSYCHIATRY & NEUROLOGY

## 2022-05-17 PROCEDURE — 87075 CULTR BACTERIA EXCEPT BLOOD: CPT | Performed by: PSYCHIATRY & NEUROLOGY

## 2022-05-17 PROCEDURE — 82525 ASSAY OF COPPER: CPT | Performed by: PSYCHIATRY & NEUROLOGY

## 2022-05-17 PROCEDURE — 82042 OTHER SOURCE ALBUMIN QUAN EA: CPT | Performed by: PSYCHIATRY & NEUROLOGY

## 2022-05-17 PROCEDURE — 82040 ASSAY OF SERUM ALBUMIN: CPT | Performed by: PSYCHIATRY & NEUROLOGY

## 2022-05-17 PROCEDURE — 80178 ASSAY OF LITHIUM: CPT

## 2022-05-17 PROCEDURE — 83873 ASSAY OF CSF PROTEIN: CPT | Performed by: PSYCHIATRY & NEUROLOGY

## 2022-05-17 PROCEDURE — 99232 SBSQ HOSP IP/OBS MODERATE 35: CPT | Performed by: STUDENT IN AN ORGANIZED HEALTH CARE EDUCATION/TRAINING PROGRAM

## 2022-05-17 PROCEDURE — 87483 CNS DNA AMP PROBE TYPE 12-25: CPT | Performed by: PSYCHIATRY & NEUROLOGY

## 2022-05-17 PROCEDURE — 84630 ASSAY OF ZINC: CPT | Performed by: PSYCHIATRY & NEUROLOGY

## 2022-05-17 PROCEDURE — 25010000002 ACYCLOVIR PER 5 MG: Performed by: STUDENT IN AN ORGANIZED HEALTH CARE EDUCATION/TRAINING PROGRAM

## 2022-05-17 PROCEDURE — 87206 SMEAR FLUORESCENT/ACID STAI: CPT | Performed by: PSYCHIATRY & NEUROLOGY

## 2022-05-17 PROCEDURE — 89050 BODY FLUID CELL COUNT: CPT | Performed by: PSYCHIATRY & NEUROLOGY

## 2022-05-17 PROCEDURE — 82390 ASSAY OF CERULOPLASMIN: CPT | Performed by: PSYCHIATRY & NEUROLOGY

## 2022-05-17 PROCEDURE — 99232 SBSQ HOSP IP/OBS MODERATE 35: CPT | Performed by: PSYCHIATRY & NEUROLOGY

## 2022-05-17 PROCEDURE — 97110 THERAPEUTIC EXERCISES: CPT

## 2022-05-17 PROCEDURE — 99232 SBSQ HOSP IP/OBS MODERATE 35: CPT | Performed by: NEUROLOGICAL SURGERY

## 2022-05-17 PROCEDURE — 82784 ASSAY IGA/IGD/IGG/IGM EACH: CPT | Performed by: PSYCHIATRY & NEUROLOGY

## 2022-05-17 PROCEDURE — 87070 CULTURE OTHR SPECIMN AEROBIC: CPT | Performed by: PSYCHIATRY & NEUROLOGY

## 2022-05-17 PROCEDURE — 82945 GLUCOSE OTHER FLUID: CPT | Performed by: PSYCHIATRY & NEUROLOGY

## 2022-05-17 PROCEDURE — 87116 MYCOBACTERIA CULTURE: CPT | Performed by: PSYCHIATRY & NEUROLOGY

## 2022-05-17 PROCEDURE — 86362 MOG-IGG1 ANTB CBA EACH: CPT | Performed by: PSYCHIATRY & NEUROLOGY

## 2022-05-17 PROCEDURE — 87205 SMEAR GRAM STAIN: CPT | Performed by: PSYCHIATRY & NEUROLOGY

## 2022-05-17 PROCEDURE — 009U3ZX DRAINAGE OF SPINAL CANAL, PERCUTANEOUS APPROACH, DIAGNOSTIC: ICD-10-PCS | Performed by: PSYCHIATRY & NEUROLOGY

## 2022-05-17 PROCEDURE — 83916 OLIGOCLONAL BANDS: CPT | Performed by: PSYCHIATRY & NEUROLOGY

## 2022-05-17 PROCEDURE — 87327 CRYPTOCOCCUS NEOFORM AG IA: CPT | Performed by: PSYCHIATRY & NEUROLOGY

## 2022-05-17 PROCEDURE — 87015 SPECIMEN INFECT AGNT CONCNTJ: CPT | Performed by: PSYCHIATRY & NEUROLOGY

## 2022-05-17 PROCEDURE — 84157 ASSAY OF PROTEIN OTHER: CPT | Performed by: PSYCHIATRY & NEUROLOGY

## 2022-05-17 PROCEDURE — 82164 ANGIOTENSIN I ENZYME TEST: CPT | Performed by: PSYCHIATRY & NEUROLOGY

## 2022-05-17 RX ORDER — TAMSULOSIN HYDROCHLORIDE 0.4 MG/1
0.4 CAPSULE ORAL DAILY
Status: DISCONTINUED | OUTPATIENT
Start: 2022-05-17 | End: 2022-05-18

## 2022-05-17 RX ORDER — METOPROLOL TARTRATE 5 MG/5ML
2.5 INJECTION INTRAVENOUS ONCE
Status: COMPLETED | OUTPATIENT
Start: 2022-05-17 | End: 2022-05-17

## 2022-05-17 RX ORDER — HYDRALAZINE HYDROCHLORIDE 25 MG/1
25 TABLET, FILM COATED ORAL EVERY 8 HOURS SCHEDULED
Status: DISCONTINUED | OUTPATIENT
Start: 2022-05-17 | End: 2022-05-19

## 2022-05-17 RX ADMIN — ACYCLOVIR SODIUM 790 MG: 500 INJECTION, SOLUTION INTRAVENOUS at 18:06

## 2022-05-17 RX ADMIN — HYDROCODONE BITARTRATE AND ACETAMINOPHEN 1 TABLET: 5; 325 TABLET ORAL at 05:25

## 2022-05-17 RX ADMIN — CARVEDILOL 25 MG: 12.5 TABLET, FILM COATED ORAL at 08:27

## 2022-05-17 RX ADMIN — HYDROCODONE BITARTRATE AND ACETAMINOPHEN 1 TABLET: 5; 325 TABLET ORAL at 14:46

## 2022-05-17 RX ADMIN — HYDRALAZINE HYDROCHLORIDE 10 MG: 10 TABLET ORAL at 05:25

## 2022-05-17 RX ADMIN — AMLODIPINE BESYLATE 10 MG: 10 TABLET ORAL at 08:35

## 2022-05-17 RX ADMIN — HYDRALAZINE HYDROCHLORIDE 25 MG: 25 TABLET, FILM COATED ORAL at 21:20

## 2022-05-17 RX ADMIN — PERPHENAZINE 4 MG: 2 TABLET, FILM COATED ORAL at 05:25

## 2022-05-17 RX ADMIN — PANTOPRAZOLE SODIUM 40 MG: 40 TABLET, DELAYED RELEASE ORAL at 16:40

## 2022-05-17 RX ADMIN — DULOXETINE HYDROCHLORIDE 60 MG: 60 CAPSULE, DELAYED RELEASE ORAL at 08:37

## 2022-05-17 RX ADMIN — METOPROLOL TARTRATE 2.5 MG: 5 INJECTION INTRAVENOUS at 04:39

## 2022-05-17 RX ADMIN — SENNOSIDES AND DOCUSATE SODIUM 2 TABLET: 50; 8.6 TABLET ORAL at 08:34

## 2022-05-17 RX ADMIN — CARVEDILOL 25 MG: 12.5 TABLET, FILM COATED ORAL at 17:58

## 2022-05-17 RX ADMIN — CARBIDOPA AND LEVODOPA 0.5 TABLET: 25; 250 TABLET ORAL at 21:20

## 2022-05-17 RX ADMIN — PERPHENAZINE 4 MG: 2 TABLET, FILM COATED ORAL at 21:20

## 2022-05-17 RX ADMIN — CLONAZEPAM 1 MG: 0.5 TABLET ORAL at 14:46

## 2022-05-17 RX ADMIN — CLONAZEPAM 1 MG: 0.5 TABLET ORAL at 05:25

## 2022-05-17 RX ADMIN — CLONAZEPAM 1 MG: 0.5 TABLET ORAL at 21:20

## 2022-05-17 RX ADMIN — PANTOPRAZOLE SODIUM 40 MG: 40 TABLET, DELAYED RELEASE ORAL at 08:27

## 2022-05-17 RX ADMIN — Medication 10 MG: at 21:20

## 2022-05-17 RX ADMIN — Medication 10 ML: at 08:37

## 2022-05-17 RX ADMIN — THIAMINE HCL TAB 100 MG 100 MG: 100 TAB at 08:36

## 2022-05-17 RX ADMIN — Medication 10 ML: at 21:21

## 2022-05-17 RX ADMIN — SENNOSIDES AND DOCUSATE SODIUM 2 TABLET: 50; 8.6 TABLET ORAL at 21:20

## 2022-05-17 RX ADMIN — CARBIDOPA AND LEVODOPA 0.5 TABLET: 25; 250 TABLET ORAL at 08:35

## 2022-05-17 RX ADMIN — LITHIUM CARBONATE 450 MG: 450 TABLET, EXTENDED RELEASE ORAL at 08:39

## 2022-05-17 RX ADMIN — PERPHENAZINE 4 MG: 2 TABLET, FILM COATED ORAL at 14:46

## 2022-05-17 RX ADMIN — TAMSULOSIN HYDROCHLORIDE 0.4 MG: 0.4 CAPSULE ORAL at 11:48

## 2022-05-17 RX ADMIN — CARBIDOPA AND LEVODOPA 0.5 TABLET: 25; 250 TABLET ORAL at 16:40

## 2022-05-17 RX ADMIN — LITHIUM CARBONATE 300 MG: 300 TABLET, FILM COATED, EXTENDED RELEASE ORAL at 17:57

## 2022-05-17 RX ADMIN — HYDRALAZINE HYDROCHLORIDE 25 MG: 25 TABLET, FILM COATED ORAL at 11:48

## 2022-05-17 RX ADMIN — LEVOTHYROXINE SODIUM 88 MCG: 88 TABLET ORAL at 05:25

## 2022-05-18 ENCOUNTER — APPOINTMENT (OUTPATIENT)
Dept: CARDIOLOGY | Facility: HOSPITAL | Age: 47
End: 2022-05-18

## 2022-05-18 ENCOUNTER — TELEPHONE (OUTPATIENT)
Dept: PSYCHIATRY | Facility: CLINIC | Age: 47
End: 2022-05-18

## 2022-05-18 LAB
ANION GAP SERPL CALCULATED.3IONS-SCNC: 13 MMOL/L (ref 5–15)
BH CV ECHO MEAS - AO MAX PG: 6.2 MMHG
BH CV ECHO MEAS - AO MEAN PG: 3.6 MMHG
BH CV ECHO MEAS - AO ROOT DIAM: 3.1 CM
BH CV ECHO MEAS - AO V2 MAX: 124.9 CM/SEC
BH CV ECHO MEAS - AO V2 VTI: 13 CM
BH CV ECHO MEAS - AVA(I,D): 2.8 CM2
BH CV ECHO MEAS - EDV(CUBED): 144.1 ML
BH CV ECHO MEAS - EDV(MOD-SP2): 55 ML
BH CV ECHO MEAS - EDV(MOD-SP4): 102 ML
BH CV ECHO MEAS - EF(MOD-BP): 62 %
BH CV ECHO MEAS - EF(MOD-SP2): 60 %
BH CV ECHO MEAS - EF(MOD-SP4): 60.8 %
BH CV ECHO MEAS - ESV(CUBED): 26.5 ML
BH CV ECHO MEAS - ESV(MOD-SP2): 22 ML
BH CV ECHO MEAS - ESV(MOD-SP4): 40 ML
BH CV ECHO MEAS - FS: 43.2 %
BH CV ECHO MEAS - IVS/LVPW: 0.94 CM
BH CV ECHO MEAS - IVSD: 0.71 CM
BH CV ECHO MEAS - LA DIMENSION: 3.2 CM
BH CV ECHO MEAS - LAT PEAK E' VEL: 9 CM/SEC
BH CV ECHO MEAS - LV MASS(C)D: 132 GRAMS
BH CV ECHO MEAS - LV MAX PG: 3.4 MMHG
BH CV ECHO MEAS - LV MEAN PG: 1.74 MMHG
BH CV ECHO MEAS - LV V1 MAX: 92.3 CM/SEC
BH CV ECHO MEAS - LV V1 VTI: 11.7 CM
BH CV ECHO MEAS - LVIDD: 5.2 CM
BH CV ECHO MEAS - LVIDS: 3 CM
BH CV ECHO MEAS - LVOT AREA: 3.1 CM2
BH CV ECHO MEAS - LVOT DIAM: 1.99 CM
BH CV ECHO MEAS - LVPWD: 0.75 CM
BH CV ECHO MEAS - MED PEAK E' VEL: 4.5 CM/SEC
BH CV ECHO MEAS - MV A MAX VEL: 80.9 CM/SEC
BH CV ECHO MEAS - MV DEC TIME: 0.17 MSEC
BH CV ECHO MEAS - MV E MAX VEL: 48.4 CM/SEC
BH CV ECHO MEAS - MV E/A: 0.6
BH CV ECHO MEAS - PA ACC SLOPE: 887.8 CM/SEC2
BH CV ECHO MEAS - PA ACC TIME: 0.08 SEC
BH CV ECHO MEAS - PA PR(ACCEL): 41 MMHG
BH CV ECHO MEAS - RAP SYSTOLE: 3 MMHG
BH CV ECHO MEAS - RVSP: 34 MMHG
BH CV ECHO MEAS - SV(LVOT): 36.1 ML
BH CV ECHO MEAS - SV(MOD-SP2): 33 ML
BH CV ECHO MEAS - SV(MOD-SP4): 62 ML
BH CV ECHO MEAS - TAPSE (>1.6): 1.9 CM
BH CV ECHO MEAS - TR MAX PG: 31.3 MMHG
BH CV ECHO MEAS - TR MAX VEL: 279.6 CM/SEC
BH CV ECHO MEASUREMENTS AVERAGE E/E' RATIO: 7.17
BH CV XLRA - RV BASE: 3.4 CM
BH CV XLRA - RV LENGTH: 6.1 CM
BH CV XLRA - RV MID: 2.1 CM
BH CV XLRA - TDI S': 24.3 CM/SEC
BUN SERPL-MCNC: 15 MG/DL (ref 6–20)
BUN/CREAT SERPL: 17.9 (ref 7–25)
CALCIUM SPEC-SCNC: 10.3 MG/DL (ref 8.6–10.5)
CHLORIDE SERPL-SCNC: 96 MMOL/L (ref 98–107)
CO2 SERPL-SCNC: 26 MMOL/L (ref 22–29)
COPPER SERPL-MCNC: 105 UG/DL (ref 80–158)
CREAT SERPL-MCNC: 0.84 MG/DL (ref 0.57–1)
CRYPTOC AG CSF QL LA: NEGATIVE
EGFRCR SERPLBLD CKD-EPI 2021: 85.3 ML/MIN/1.73
GAD65 AB SER IA-ACNC: <5 U/ML (ref 0–5)
GLUCOSE SERPL-MCNC: 120 MG/DL (ref 65–99)
LEFT ATRIUM VOLUME INDEX: 14.7 ML/M2
MAXIMAL PREDICTED HEART RATE: 171 BPM
POTASSIUM SERPL-SCNC: 3 MMOL/L (ref 3.5–5.2)
SODIUM SERPL-SCNC: 135 MMOL/L (ref 136–145)
STRESS TARGET HR: 145 BPM

## 2022-05-18 PROCEDURE — 97535 SELF CARE MNGMENT TRAINING: CPT

## 2022-05-18 PROCEDURE — 86051 AQUAPORIN-4 ANTB ELISA: CPT | Performed by: PSYCHIATRY & NEUROLOGY

## 2022-05-18 PROCEDURE — 93306 TTE W/DOPPLER COMPLETE: CPT

## 2022-05-18 PROCEDURE — 99222 1ST HOSP IP/OBS MODERATE 55: CPT | Performed by: INTERNAL MEDICINE

## 2022-05-18 PROCEDURE — 97110 THERAPEUTIC EXERCISES: CPT

## 2022-05-18 PROCEDURE — 99233 SBSQ HOSP IP/OBS HIGH 50: CPT | Performed by: PSYCHIATRY & NEUROLOGY

## 2022-05-18 PROCEDURE — 80048 BASIC METABOLIC PNL TOTAL CA: CPT | Performed by: STUDENT IN AN ORGANIZED HEALTH CARE EDUCATION/TRAINING PROGRAM

## 2022-05-18 PROCEDURE — 99232 SBSQ HOSP IP/OBS MODERATE 35: CPT | Performed by: STUDENT IN AN ORGANIZED HEALTH CARE EDUCATION/TRAINING PROGRAM

## 2022-05-18 PROCEDURE — 93306 TTE W/DOPPLER COMPLETE: CPT | Performed by: INTERNAL MEDICINE

## 2022-05-18 PROCEDURE — 25010000002 ENOXAPARIN PER 10 MG: Performed by: STUDENT IN AN ORGANIZED HEALTH CARE EDUCATION/TRAINING PROGRAM

## 2022-05-18 PROCEDURE — 25010000002 ACYCLOVIR PER 5 MG: Performed by: STUDENT IN AN ORGANIZED HEALTH CARE EDUCATION/TRAINING PROGRAM

## 2022-05-18 RX ORDER — ENOXAPARIN SODIUM 100 MG/ML
40 INJECTION SUBCUTANEOUS DAILY
Status: DISCONTINUED | OUTPATIENT
Start: 2022-05-18 | End: 2022-05-28 | Stop reason: HOSPADM

## 2022-05-18 RX ORDER — LITHIUM CARBONATE 300 MG/1
300 TABLET, FILM COATED, EXTENDED RELEASE ORAL DAILY
Status: DISCONTINUED | OUTPATIENT
Start: 2022-05-19 | End: 2022-05-18

## 2022-05-18 RX ORDER — CLONAZEPAM 0.5 MG/1
1 TABLET ORAL EVERY 8 HOURS SCHEDULED
Status: DISCONTINUED | OUTPATIENT
Start: 2022-05-18 | End: 2022-05-19

## 2022-05-18 RX ORDER — VALACYCLOVIR HYDROCHLORIDE 500 MG/1
1000 TABLET, FILM COATED ORAL EVERY 8 HOURS SCHEDULED
Status: DISCONTINUED | OUTPATIENT
Start: 2022-05-18 | End: 2022-05-19

## 2022-05-18 RX ORDER — CLONIDINE HYDROCHLORIDE 0.1 MG/1
0.1 TABLET ORAL
Status: DISCONTINUED | OUTPATIENT
Start: 2022-05-18 | End: 2022-05-19

## 2022-05-18 RX ORDER — TAMSULOSIN HYDROCHLORIDE 0.4 MG/1
0.4 CAPSULE ORAL ONCE
Status: COMPLETED | OUTPATIENT
Start: 2022-05-18 | End: 2022-05-18

## 2022-05-18 RX ORDER — TAMSULOSIN HYDROCHLORIDE 0.4 MG/1
0.8 CAPSULE ORAL DAILY
Status: DISCONTINUED | OUTPATIENT
Start: 2022-05-19 | End: 2022-05-20

## 2022-05-18 RX ORDER — LITHIUM CARBONATE 300 MG/1
300 TABLET, FILM COATED, EXTENDED RELEASE ORAL EVERY 12 HOURS SCHEDULED
Status: DISCONTINUED | OUTPATIENT
Start: 2022-05-19 | End: 2022-05-20

## 2022-05-18 RX ADMIN — Medication 10 MG: at 21:47

## 2022-05-18 RX ADMIN — TAMSULOSIN HYDROCHLORIDE 0.4 MG: 0.4 CAPSULE ORAL at 08:33

## 2022-05-18 RX ADMIN — LEVOTHYROXINE SODIUM 88 MCG: 88 TABLET ORAL at 05:39

## 2022-05-18 RX ADMIN — BISACODYL 5 MG: 5 TABLET, COATED ORAL at 21:47

## 2022-05-18 RX ADMIN — PERPHENAZINE 4 MG: 2 TABLET, FILM COATED ORAL at 05:38

## 2022-05-18 RX ADMIN — PERPHENAZINE 4 MG: 2 TABLET, FILM COATED ORAL at 21:47

## 2022-05-18 RX ADMIN — POTASSIUM CHLORIDE 40 MEQ: 10 CAPSULE, COATED, EXTENDED RELEASE ORAL at 13:02

## 2022-05-18 RX ADMIN — CLONAZEPAM 1 MG: 0.5 TABLET ORAL at 13:01

## 2022-05-18 RX ADMIN — ACYCLOVIR SODIUM 790 MG: 500 INJECTION, SOLUTION INTRAVENOUS at 01:55

## 2022-05-18 RX ADMIN — HYDRALAZINE HYDROCHLORIDE 25 MG: 25 TABLET, FILM COATED ORAL at 05:38

## 2022-05-18 RX ADMIN — SENNOSIDES AND DOCUSATE SODIUM 2 TABLET: 50; 8.6 TABLET ORAL at 08:33

## 2022-05-18 RX ADMIN — ACYCLOVIR SODIUM 790 MG: 500 INJECTION, SOLUTION INTRAVENOUS at 09:11

## 2022-05-18 RX ADMIN — CLONAZEPAM 1 MG: 0.5 TABLET ORAL at 21:48

## 2022-05-18 RX ADMIN — Medication 10 ML: at 21:49

## 2022-05-18 RX ADMIN — SENNOSIDES AND DOCUSATE SODIUM 2 TABLET: 50; 8.6 TABLET ORAL at 21:47

## 2022-05-18 RX ADMIN — AMLODIPINE BESYLATE 10 MG: 10 TABLET ORAL at 08:32

## 2022-05-18 RX ADMIN — Medication 10 ML: at 08:34

## 2022-05-18 RX ADMIN — POTASSIUM CHLORIDE 40 MEQ: 10 CAPSULE, COATED, EXTENDED RELEASE ORAL at 21:47

## 2022-05-18 RX ADMIN — PERPHENAZINE 4 MG: 2 TABLET, FILM COATED ORAL at 13:03

## 2022-05-18 RX ADMIN — CARBIDOPA AND LEVODOPA 0.5 TABLET: 25; 250 TABLET ORAL at 08:32

## 2022-05-18 RX ADMIN — METOPROLOL TARTRATE 25 MG: 25 TABLET, FILM COATED ORAL at 21:47

## 2022-05-18 RX ADMIN — ENOXAPARIN SODIUM 40 MG: 40 INJECTION SUBCUTANEOUS at 13:10

## 2022-05-18 RX ADMIN — TAMSULOSIN HYDROCHLORIDE 0.4 MG: 0.4 CAPSULE ORAL at 13:02

## 2022-05-18 RX ADMIN — LITHIUM CARBONATE 450 MG: 450 TABLET, EXTENDED RELEASE ORAL at 08:32

## 2022-05-18 RX ADMIN — VALACYCLOVIR HYDROCHLORIDE 1000 MG: 500 TABLET, FILM COATED ORAL at 21:47

## 2022-05-18 RX ADMIN — CLONAZEPAM 1 MG: 0.5 TABLET ORAL at 05:38

## 2022-05-18 RX ADMIN — CARVEDILOL 25 MG: 12.5 TABLET, FILM COATED ORAL at 08:32

## 2022-05-18 RX ADMIN — THIAMINE HCL TAB 100 MG 100 MG: 100 TAB at 08:31

## 2022-05-18 RX ADMIN — DULOXETINE HYDROCHLORIDE 60 MG: 60 CAPSULE, DELAYED RELEASE ORAL at 08:31

## 2022-05-18 RX ADMIN — POTASSIUM CHLORIDE 40 MEQ: 10 CAPSULE, COATED, EXTENDED RELEASE ORAL at 08:36

## 2022-05-18 RX ADMIN — PANTOPRAZOLE SODIUM 40 MG: 40 TABLET, DELAYED RELEASE ORAL at 18:27

## 2022-05-18 RX ADMIN — HYDRALAZINE HYDROCHLORIDE 25 MG: 25 TABLET, FILM COATED ORAL at 21:48

## 2022-05-18 RX ADMIN — PANTOPRAZOLE SODIUM 40 MG: 40 TABLET, DELAYED RELEASE ORAL at 08:32

## 2022-05-18 RX ADMIN — HYDRALAZINE HYDROCHLORIDE 25 MG: 25 TABLET, FILM COATED ORAL at 13:28

## 2022-05-19 ENCOUNTER — APPOINTMENT (OUTPATIENT)
Dept: GENERAL RADIOLOGY | Facility: HOSPITAL | Age: 47
End: 2022-05-19

## 2022-05-19 ENCOUNTER — ANCILLARY PROCEDURE (OUTPATIENT)
Dept: SPEECH THERAPY | Facility: HOSPITAL | Age: 47
End: 2022-05-19

## 2022-05-19 LAB
ACE CSF-CCNC: <1.5 U/L (ref 0–2.5)
ANION GAP SERPL CALCULATED.3IONS-SCNC: 12 MMOL/L (ref 5–15)
BACTERIA UR QL AUTO: ABNORMAL /HPF
BILIRUB UR QL STRIP: NEGATIVE
BUN SERPL-MCNC: 13 MG/DL (ref 6–20)
BUN/CREAT SERPL: 13.7 (ref 7–25)
CALCIUM SPEC-SCNC: 11.1 MG/DL (ref 8.6–10.5)
CHLORIDE SERPL-SCNC: 107 MMOL/L (ref 98–107)
CLARITY UR: ABNORMAL
CO2 SERPL-SCNC: 25 MMOL/L (ref 22–29)
COLOR UR: YELLOW
COPPER SERPL-MCNC: 164 UG/DL (ref 80–158)
CREAT SERPL-MCNC: 0.95 MG/DL (ref 0.57–1)
D-LACTATE SERPL-SCNC: 1 MMOL/L (ref 0.5–2)
DEPRECATED RDW RBC AUTO: 47.3 FL (ref 37–54)
EGFRCR SERPLBLD CKD-EPI 2021: 73.6 ML/MIN/1.73
ERYTHROCYTE [DISTWIDTH] IN BLOOD BY AUTOMATED COUNT: 15.7 % (ref 12.3–15.4)
GLUCOSE BLDC GLUCOMTR-MCNC: 121 MG/DL (ref 70–130)
GLUCOSE BLDC GLUCOMTR-MCNC: 122 MG/DL (ref 70–130)
GLUCOSE SERPL-MCNC: 148 MG/DL (ref 65–99)
GLUCOSE UR STRIP-MCNC: NEGATIVE MG/DL
HCT VFR BLD AUTO: 45 % (ref 34–46.6)
HGB BLD-MCNC: 14.7 G/DL (ref 12–15.9)
HGB UR QL STRIP.AUTO: ABNORMAL
HYALINE CASTS UR QL AUTO: ABNORMAL /LPF
KETONES UR QL STRIP: NEGATIVE
LEUKOCYTE ESTERASE UR QL STRIP.AUTO: ABNORMAL
LITHIUM SERPL-SCNC: 1.3 MMOL/L (ref 0.6–1.2)
MAGNESIUM SERPL-MCNC: 2.5 MG/DL (ref 1.6–2.6)
MCH RBC QN AUTO: 27.1 PG (ref 26.6–33)
MCHC RBC AUTO-ENTMCNC: 32.7 G/DL (ref 31.5–35.7)
MCV RBC AUTO: 82.9 FL (ref 79–97)
NITRITE UR QL STRIP: NEGATIVE
PH UR STRIP.AUTO: 6 [PH] (ref 5–8)
PLATELET # BLD AUTO: 337 10*3/MM3 (ref 140–450)
PMV BLD AUTO: 11.1 FL (ref 6–12)
POTASSIUM SERPL-SCNC: 4.5 MMOL/L (ref 3.5–5.2)
PROCALCITONIN SERPL-MCNC: 0.17 NG/ML (ref 0–0.25)
PROT UR QL STRIP: ABNORMAL
RBC # BLD AUTO: 5.43 10*6/MM3 (ref 3.77–5.28)
RBC # UR STRIP: ABNORMAL /HPF
REAGIN AB CSF QL: NON REACTIVE
REF LAB TEST METHOD: ABNORMAL
REF LAB TEST METHOD: NORMAL
SODIUM SERPL-SCNC: 144 MMOL/L (ref 136–145)
SP GR UR STRIP: 1.01 (ref 1–1.03)
SQUAMOUS #/AREA URNS HPF: ABNORMAL /HPF
UROBILINOGEN UR QL STRIP: ABNORMAL
WBC # UR STRIP: ABNORMAL /HPF
WBC NRBC COR # BLD: 11.68 10*3/MM3 (ref 3.4–10.8)
ZINC SERPL-MCNC: 100 UG/DL (ref 44–115)

## 2022-05-19 PROCEDURE — 85027 COMPLETE CBC AUTOMATED: CPT | Performed by: PHYSICIAN ASSISTANT

## 2022-05-19 PROCEDURE — 74018 RADEX ABDOMEN 1 VIEW: CPT

## 2022-05-19 PROCEDURE — 82962 GLUCOSE BLOOD TEST: CPT

## 2022-05-19 PROCEDURE — 90792 PSYCH DIAG EVAL W/MED SRVCS: CPT | Performed by: NURSE PRACTITIONER

## 2022-05-19 PROCEDURE — 92610 EVALUATE SWALLOWING FUNCTION: CPT

## 2022-05-19 PROCEDURE — 80178 ASSAY OF LITHIUM: CPT | Performed by: STUDENT IN AN ORGANIZED HEALTH CARE EDUCATION/TRAINING PROGRAM

## 2022-05-19 PROCEDURE — 83735 ASSAY OF MAGNESIUM: CPT | Performed by: INTERNAL MEDICINE

## 2022-05-19 PROCEDURE — 99232 SBSQ HOSP IP/OBS MODERATE 35: CPT | Performed by: PSYCHIATRY & NEUROLOGY

## 2022-05-19 PROCEDURE — 87186 SC STD MICRODIL/AGAR DIL: CPT | Performed by: PHYSICIAN ASSISTANT

## 2022-05-19 PROCEDURE — 92612 ENDOSCOPY SWALLOW (FEES) VID: CPT

## 2022-05-19 PROCEDURE — 87086 URINE CULTURE/COLONY COUNT: CPT | Performed by: PHYSICIAN ASSISTANT

## 2022-05-19 PROCEDURE — 25010000002 ENOXAPARIN PER 10 MG: Performed by: STUDENT IN AN ORGANIZED HEALTH CARE EDUCATION/TRAINING PROGRAM

## 2022-05-19 PROCEDURE — 25010000002 CEFTRIAXONE PER 250 MG: Performed by: INTERNAL MEDICINE

## 2022-05-19 PROCEDURE — 84145 PROCALCITONIN (PCT): CPT | Performed by: INTERNAL MEDICINE

## 2022-05-19 PROCEDURE — 81001 URINALYSIS AUTO W/SCOPE: CPT | Performed by: PHYSICIAN ASSISTANT

## 2022-05-19 PROCEDURE — 99231 SBSQ HOSP IP/OBS SF/LOW 25: CPT | Performed by: INTERNAL MEDICINE

## 2022-05-19 PROCEDURE — 80048 BASIC METABOLIC PNL TOTAL CA: CPT | Performed by: INTERNAL MEDICINE

## 2022-05-19 PROCEDURE — 87077 CULTURE AEROBIC IDENTIFY: CPT | Performed by: PHYSICIAN ASSISTANT

## 2022-05-19 PROCEDURE — 83605 ASSAY OF LACTIC ACID: CPT | Performed by: PHYSICIAN ASSISTANT

## 2022-05-19 PROCEDURE — 99233 SBSQ HOSP IP/OBS HIGH 50: CPT | Performed by: INTERNAL MEDICINE

## 2022-05-19 RX ORDER — ACETAMINOPHEN 160 MG/5ML
650 SOLUTION ORAL EVERY 4 HOURS PRN
Status: DISCONTINUED | OUTPATIENT
Start: 2022-05-19 | End: 2022-05-22

## 2022-05-19 RX ORDER — VALACYCLOVIR HYDROCHLORIDE 500 MG/1
1000 TABLET, FILM COATED ORAL EVERY 8 HOURS SCHEDULED
Status: DISCONTINUED | OUTPATIENT
Start: 2022-05-19 | End: 2022-05-22

## 2022-05-19 RX ORDER — CLONIDINE HYDROCHLORIDE 0.1 MG/1
0.1 TABLET ORAL
Status: DISCONTINUED | OUTPATIENT
Start: 2022-05-19 | End: 2022-05-22

## 2022-05-19 RX ORDER — POLYETHYLENE GLYCOL 3350 17 G/17G
17 POWDER, FOR SOLUTION ORAL DAILY PRN
Status: DISCONTINUED | OUTPATIENT
Start: 2022-05-19 | End: 2022-05-20

## 2022-05-19 RX ORDER — POTASSIUM CHLORIDE 750 MG/1
40 CAPSULE, EXTENDED RELEASE ORAL AS NEEDED
Status: DISCONTINUED | OUTPATIENT
Start: 2022-05-19 | End: 2022-05-20

## 2022-05-19 RX ORDER — AMLODIPINE BESYLATE 10 MG/1
10 TABLET ORAL
Status: DISCONTINUED | OUTPATIENT
Start: 2022-05-20 | End: 2022-05-22

## 2022-05-19 RX ORDER — BISACODYL 5 MG/1
5 TABLET, DELAYED RELEASE ORAL DAILY PRN
Status: DISCONTINUED | OUTPATIENT
Start: 2022-05-19 | End: 2022-05-20

## 2022-05-19 RX ORDER — CLONAZEPAM 0.5 MG/1
1 TABLET ORAL 2 TIMES DAILY PRN
Status: DISPENSED | OUTPATIENT
Start: 2022-05-19 | End: 2022-05-21

## 2022-05-19 RX ORDER — LEVOTHYROXINE SODIUM 88 UG/1
88 TABLET ORAL
Status: DISCONTINUED | OUTPATIENT
Start: 2022-05-20 | End: 2022-05-22

## 2022-05-19 RX ORDER — HYDRALAZINE HYDROCHLORIDE 10 MG/1
10 TABLET, FILM COATED ORAL EVERY 6 HOURS PRN
Status: DISCONTINUED | OUTPATIENT
Start: 2022-05-19 | End: 2022-05-22

## 2022-05-19 RX ORDER — POTASSIUM CHLORIDE 7.45 MG/ML
10 INJECTION INTRAVENOUS
Status: DISCONTINUED | OUTPATIENT
Start: 2022-05-19 | End: 2022-05-22

## 2022-05-19 RX ORDER — PERPHENAZINE 2 MG/1
4 TABLET ORAL EVERY 8 HOURS SCHEDULED
Status: DISCONTINUED | OUTPATIENT
Start: 2022-05-19 | End: 2022-05-20

## 2022-05-19 RX ORDER — ACETAMINOPHEN 650 MG/1
650 SUPPOSITORY RECTAL EVERY 4 HOURS PRN
Status: DISCONTINUED | OUTPATIENT
Start: 2022-05-19 | End: 2022-05-22

## 2022-05-19 RX ORDER — POTASSIUM CHLORIDE 1.5 G/1.77G
40 POWDER, FOR SOLUTION ORAL AS NEEDED
Status: DISCONTINUED | OUTPATIENT
Start: 2022-05-19 | End: 2022-05-22

## 2022-05-19 RX ORDER — ONDANSETRON 2 MG/ML
4 INJECTION INTRAMUSCULAR; INTRAVENOUS EVERY 6 HOURS PRN
Status: DISCONTINUED | OUTPATIENT
Start: 2022-05-19 | End: 2022-05-22

## 2022-05-19 RX ORDER — ACETAMINOPHEN 325 MG/1
650 TABLET ORAL EVERY 4 HOURS PRN
Status: DISCONTINUED | OUTPATIENT
Start: 2022-05-19 | End: 2022-05-22

## 2022-05-19 RX ORDER — METOPROLOL TARTRATE 50 MG/1
50 TABLET, FILM COATED ORAL EVERY 12 HOURS SCHEDULED
Status: DISCONTINUED | OUTPATIENT
Start: 2022-05-19 | End: 2022-05-22

## 2022-05-19 RX ORDER — CLONAZEPAM 0.5 MG/1
1 TABLET ORAL EVERY 8 HOURS SCHEDULED
Status: DISCONTINUED | OUTPATIENT
Start: 2022-05-19 | End: 2022-05-22

## 2022-05-19 RX ORDER — METOPROLOL TARTRATE 50 MG/1
50 TABLET, FILM COATED ORAL EVERY 12 HOURS SCHEDULED
Status: DISCONTINUED | OUTPATIENT
Start: 2022-05-19 | End: 2022-05-19

## 2022-05-19 RX ORDER — AMOXICILLIN 250 MG
2 CAPSULE ORAL 2 TIMES DAILY
Status: DISCONTINUED | OUTPATIENT
Start: 2022-05-19 | End: 2022-05-20

## 2022-05-19 RX ORDER — CHOLECALCIFEROL (VITAMIN D3) 125 MCG
10 CAPSULE ORAL NIGHTLY
Status: DISCONTINUED | OUTPATIENT
Start: 2022-05-19 | End: 2022-05-22

## 2022-05-19 RX ORDER — HYDRALAZINE HYDROCHLORIDE 25 MG/1
25 TABLET, FILM COATED ORAL EVERY 8 HOURS SCHEDULED
Status: DISCONTINUED | OUTPATIENT
Start: 2022-05-19 | End: 2022-05-22

## 2022-05-19 RX ORDER — BISACODYL 10 MG
10 SUPPOSITORY, RECTAL RECTAL DAILY PRN
Status: DISCONTINUED | OUTPATIENT
Start: 2022-05-19 | End: 2022-05-20

## 2022-05-19 RX ORDER — ONDANSETRON 4 MG/1
4 TABLET, FILM COATED ORAL EVERY 6 HOURS PRN
Status: DISCONTINUED | OUTPATIENT
Start: 2022-05-19 | End: 2022-05-22

## 2022-05-19 RX ORDER — HYDROCODONE BITARTRATE AND ACETAMINOPHEN 5; 325 MG/1; MG/1
1 TABLET ORAL EVERY 6 HOURS PRN
Status: DISPENSED | OUTPATIENT
Start: 2022-05-19 | End: 2022-05-22

## 2022-05-19 RX ADMIN — CLONAZEPAM 1 MG: 0.5 TABLET ORAL at 09:54

## 2022-05-19 RX ADMIN — LEVOTHYROXINE SODIUM 88 MCG: 88 TABLET ORAL at 09:55

## 2022-05-19 RX ADMIN — THIAMINE HCL TAB 100 MG 100 MG: 100 TAB at 09:58

## 2022-05-19 RX ADMIN — PERPHENAZINE 4 MG: 2 TABLET, FILM COATED ORAL at 22:38

## 2022-05-19 RX ADMIN — AMLODIPINE BESYLATE 10 MG: 10 TABLET ORAL at 09:53

## 2022-05-19 RX ADMIN — LITHIUM CARBONATE 300 MG: 300 TABLET, FILM COATED, EXTENDED RELEASE ORAL at 09:57

## 2022-05-19 RX ADMIN — SODIUM CHLORIDE 1 G: 900 INJECTION INTRAVENOUS at 09:55

## 2022-05-19 RX ADMIN — DULOXETINE HYDROCHLORIDE 60 MG: 60 CAPSULE, DELAYED RELEASE ORAL at 09:53

## 2022-05-19 RX ADMIN — SENNOSIDES AND DOCUSATE SODIUM 2 TABLET: 50; 8.6 TABLET ORAL at 09:58

## 2022-05-19 RX ADMIN — TAMSULOSIN HYDROCHLORIDE 0.8 MG: 0.4 CAPSULE ORAL at 09:58

## 2022-05-19 RX ADMIN — METOPROLOL TARTRATE 50 MG: 50 TABLET, FILM COATED ORAL at 09:55

## 2022-05-19 RX ADMIN — Medication 10 ML: at 09:58

## 2022-05-19 RX ADMIN — SENNOSIDES AND DOCUSATE SODIUM 2 TABLET: 50; 8.6 TABLET ORAL at 22:37

## 2022-05-19 RX ADMIN — CLONAZEPAM 1 MG: 0.5 TABLET ORAL at 22:36

## 2022-05-19 RX ADMIN — HYDRALAZINE HYDROCHLORIDE 25 MG: 25 TABLET, FILM COATED ORAL at 22:36

## 2022-05-19 RX ADMIN — VALACYCLOVIR HYDROCHLORIDE 1000 MG: 500 TABLET, FILM COATED ORAL at 22:38

## 2022-05-19 RX ADMIN — Medication 10 MG: at 22:37

## 2022-05-19 RX ADMIN — Medication 10 ML: at 22:38

## 2022-05-19 RX ADMIN — PANTOPRAZOLE SODIUM 40 MG: 40 TABLET, DELAYED RELEASE ORAL at 09:55

## 2022-05-19 RX ADMIN — METOPROLOL TARTRATE 50 MG: 50 TABLET, FILM COATED ORAL at 22:37

## 2022-05-19 RX ADMIN — ENOXAPARIN SODIUM 40 MG: 40 INJECTION SUBCUTANEOUS at 09:57

## 2022-05-20 PROBLEM — E43 SEVERE MALNUTRITION (HCC): Status: ACTIVE | Noted: 2022-05-20

## 2022-05-20 LAB
A-TOCOPHEROL VIT E SERPL-MCNC: 11.2 MG/L (ref 7–25.1)
ALB CSF/SERPL: 6 {RATIO} (ref 0–8)
ALBUMIN CSF-MCNC: 30 MG/DL (ref 8–37)
ALBUMIN SERPL-MCNC: 4.3 G/DL (ref 3.5–5.2)
ALBUMIN SERPL-MCNC: 4.7 G/DL (ref 3.8–4.8)
ALP SERPL-CCNC: 76 U/L (ref 39–117)
ALT SERPL W P-5'-P-CCNC: 17 U/L (ref 1–33)
ANION GAP SERPL CALCULATED.3IONS-SCNC: 12 MMOL/L (ref 5–15)
AST SERPL-CCNC: 20 U/L (ref 1–32)
BACTERIA SPEC AEROBE CULT: NORMAL
BASOPHILS # BLD AUTO: 0.07 10*3/MM3 (ref 0–0.2)
BASOPHILS NFR BLD AUTO: 0.5 % (ref 0–1.5)
BILIRUB SERPL-MCNC: 0.2 MG/DL (ref 0–1.2)
BUN SERPL-MCNC: 18 MG/DL (ref 6–20)
CALCIUM SPEC-SCNC: 10.5 MG/DL (ref 8.6–10.5)
CHLORIDE SERPL-SCNC: 107 MMOL/L (ref 98–107)
CHOLEST SERPL-MCNC: 176 MG/DL (ref 0–200)
CO2 SERPL-SCNC: 25 MMOL/L (ref 22–29)
CREAT SERPL-MCNC: 0.79 MG/DL (ref 0.57–1)
DEPRECATED RDW RBC AUTO: 49.3 FL (ref 37–54)
EOSINOPHIL # BLD AUTO: 0.13 10*3/MM3 (ref 0–0.4)
EOSINOPHIL NFR BLD AUTO: 1 % (ref 0.3–6.2)
ERYTHROCYTE [DISTWIDTH] IN BLOOD BY AUTOMATED COUNT: 16.8 % (ref 12.3–15.4)
FOLATE SERPL-MCNC: 4 NG/ML (ref 4.78–24.2)
GAMMA TOCOPHEROL SERPL-MCNC: 0.4 MG/L (ref 0.5–5.5)
GLUCOSE BLDC GLUCOMTR-MCNC: 112 MG/DL (ref 70–130)
GLUCOSE BLDC GLUCOMTR-MCNC: 135 MG/DL (ref 70–130)
GLUCOSE BLDC GLUCOMTR-MCNC: 162 MG/DL (ref 70–130)
GLUCOSE SERPL-MCNC: 134 MG/DL (ref 65–99)
GRAM STN SPEC: NORMAL
HCT VFR BLD AUTO: 42 % (ref 34–46.6)
HGB BLD-MCNC: 13.9 G/DL (ref 12–15.9)
HU AB SER QL IF: NEGATIVE
HU2 AB SER QL IF: NEGATIVE
IGG CSF-MCNC: 4.1 MG/DL (ref 0–6.7)
IGG SERPL-MCNC: 1188 MG/DL (ref 586–1602)
IGG SYNTH RATE SER+CSF CALC-MRATE: -0.8 MG/DAY
IGG/ALB CLEAR SER+CSF-RTO: 0.5 (ref 0–0.7)
IGG/ALB CSF: 0.14 {RATIO} (ref 0–0.25)
IMM GRANULOCYTES # BLD AUTO: 0.04 10*3/MM3 (ref 0–0.05)
IMM GRANULOCYTES NFR BLD AUTO: 0.3 % (ref 0–0.5)
LITHIUM SERPL-SCNC: 0.9 MMOL/L (ref 0.6–1.2)
LYMPHOCYTES # BLD AUTO: 1.43 10*3/MM3 (ref 0.7–3.1)
LYMPHOCYTES NFR BLD AUTO: 11 % (ref 19.6–45.3)
MAGNESIUM SERPL-MCNC: 2.4 MG/DL (ref 1.6–2.6)
MBP CSF-MCNC: 3.1 NG/ML (ref 0–3.7)
MCH RBC QN AUTO: 27.4 PG (ref 26.6–33)
MCHC RBC AUTO-ENTMCNC: 33.1 G/DL (ref 31.5–35.7)
MCV RBC AUTO: 82.8 FL (ref 79–97)
MOG AB SER QL CBA IFA: NEGATIVE
MONOCYTES # BLD AUTO: 1.06 10*3/MM3 (ref 0.1–0.9)
MONOCYTES NFR BLD AUTO: 8.1 % (ref 5–12)
NEUTROPHILS NFR BLD AUTO: 10.31 10*3/MM3 (ref 1.7–7)
NEUTROPHILS NFR BLD AUTO: 79.1 % (ref 42.7–76)
NRBC BLD AUTO-RTO: 0 /100 WBC (ref 0–0.2)
OLIGOCLONAL BANDS.IT SER+CSF QL: NORMAL
PCA-1 AB SER QL IF: NEGATIVE
PHOSPHATE SERPL-MCNC: 3.4 MG/DL (ref 2.5–4.5)
PLATELET # BLD AUTO: 273 10*3/MM3 (ref 140–450)
PMV BLD AUTO: 12.5 FL (ref 6–12)
POTASSIUM SERPL-SCNC: 4.2 MMOL/L (ref 3.5–5.2)
PROT SERPL-MCNC: 6.6 G/DL (ref 6–8.5)
RBC # BLD AUTO: 5.07 10*6/MM3 (ref 3.77–5.28)
SODIUM SERPL-SCNC: 144 MMOL/L (ref 136–145)
TRIGL SERPL-MCNC: 142 MG/DL (ref 0–150)
WBC NRBC COR # BLD: 13.04 10*3/MM3 (ref 3.4–10.8)

## 2022-05-20 PROCEDURE — 99231 SBSQ HOSP IP/OBS SF/LOW 25: CPT | Performed by: PSYCHIATRY & NEUROLOGY

## 2022-05-20 PROCEDURE — 82465 ASSAY BLD/SERUM CHOLESTEROL: CPT | Performed by: INTERNAL MEDICINE

## 2022-05-20 PROCEDURE — 82746 ASSAY OF FOLIC ACID SERUM: CPT | Performed by: PSYCHIATRY & NEUROLOGY

## 2022-05-20 PROCEDURE — 25010000002 ENOXAPARIN PER 10 MG: Performed by: STUDENT IN AN ORGANIZED HEALTH CARE EDUCATION/TRAINING PROGRAM

## 2022-05-20 PROCEDURE — 82962 GLUCOSE BLOOD TEST: CPT

## 2022-05-20 PROCEDURE — 84478 ASSAY OF TRIGLYCERIDES: CPT | Performed by: INTERNAL MEDICINE

## 2022-05-20 PROCEDURE — 97530 THERAPEUTIC ACTIVITIES: CPT

## 2022-05-20 PROCEDURE — 85025 COMPLETE CBC W/AUTO DIFF WBC: CPT | Performed by: INTERNAL MEDICINE

## 2022-05-20 PROCEDURE — 83921 ORGANIC ACID SINGLE QUANT: CPT | Performed by: PSYCHIATRY & NEUROLOGY

## 2022-05-20 PROCEDURE — 84100 ASSAY OF PHOSPHORUS: CPT | Performed by: INTERNAL MEDICINE

## 2022-05-20 PROCEDURE — 86592 SYPHILIS TEST NON-TREP QUAL: CPT | Performed by: PSYCHIATRY & NEUROLOGY

## 2022-05-20 PROCEDURE — 83735 ASSAY OF MAGNESIUM: CPT | Performed by: INTERNAL MEDICINE

## 2022-05-20 PROCEDURE — 80178 ASSAY OF LITHIUM: CPT

## 2022-05-20 PROCEDURE — 80053 COMPREHEN METABOLIC PANEL: CPT | Performed by: INTERNAL MEDICINE

## 2022-05-20 PROCEDURE — 25010000002 CEFTRIAXONE PER 250 MG: Performed by: INTERNAL MEDICINE

## 2022-05-20 PROCEDURE — 97535 SELF CARE MNGMENT TRAINING: CPT

## 2022-05-20 PROCEDURE — 99232 SBSQ HOSP IP/OBS MODERATE 35: CPT | Performed by: INTERNAL MEDICINE

## 2022-05-20 RX ORDER — POLYETHYLENE GLYCOL 3350 17 G/17G
17 POWDER, FOR SOLUTION ORAL DAILY PRN
Status: DISCONTINUED | OUTPATIENT
Start: 2022-05-20 | End: 2022-05-22

## 2022-05-20 RX ORDER — BISACODYL 10 MG
10 SUPPOSITORY, RECTAL RECTAL DAILY PRN
Status: DISCONTINUED | OUTPATIENT
Start: 2022-05-20 | End: 2022-05-22

## 2022-05-20 RX ORDER — AMOXICILLIN 250 MG
2 CAPSULE ORAL 2 TIMES DAILY
Status: DISCONTINUED | OUTPATIENT
Start: 2022-05-20 | End: 2022-05-22

## 2022-05-20 RX ORDER — PERPHENAZINE 2 MG/1
8 TABLET ORAL EVERY 8 HOURS SCHEDULED
Status: DISCONTINUED | OUTPATIENT
Start: 2022-05-20 | End: 2022-05-22

## 2022-05-20 RX ORDER — LITHIUM CARBONATE 300 MG/1
300 CAPSULE ORAL 2 TIMES DAILY
Status: DISCONTINUED | OUTPATIENT
Start: 2022-05-20 | End: 2022-05-22

## 2022-05-20 RX ORDER — PANTOPRAZOLE SODIUM 40 MG/10ML
40 INJECTION, POWDER, LYOPHILIZED, FOR SOLUTION INTRAVENOUS EVERY 12 HOURS SCHEDULED
Status: DISCONTINUED | OUTPATIENT
Start: 2022-05-20 | End: 2022-05-23

## 2022-05-20 RX ADMIN — METOPROLOL TARTRATE 50 MG: 50 TABLET, FILM COATED ORAL at 08:16

## 2022-05-20 RX ADMIN — CLONAZEPAM 1 MG: 0.5 TABLET ORAL at 06:13

## 2022-05-20 RX ADMIN — CLONAZEPAM 1 MG: 0.5 TABLET ORAL at 17:03

## 2022-05-20 RX ADMIN — CLONAZEPAM 1 MG: 0.5 TABLET ORAL at 21:44

## 2022-05-20 RX ADMIN — AMLODIPINE BESYLATE 10 MG: 10 TABLET ORAL at 08:16

## 2022-05-20 RX ADMIN — HYDRALAZINE HYDROCHLORIDE 25 MG: 25 TABLET, FILM COATED ORAL at 17:02

## 2022-05-20 RX ADMIN — Medication 10 MG: at 21:44

## 2022-05-20 RX ADMIN — HYDRALAZINE HYDROCHLORIDE 25 MG: 25 TABLET, FILM COATED ORAL at 06:13

## 2022-05-20 RX ADMIN — HYDRALAZINE HYDROCHLORIDE 25 MG: 25 TABLET, FILM COATED ORAL at 21:45

## 2022-05-20 RX ADMIN — METOPROLOL TARTRATE 50 MG: 50 TABLET, FILM COATED ORAL at 21:45

## 2022-05-20 RX ADMIN — LEVOTHYROXINE SODIUM 88 MCG: 88 TABLET ORAL at 06:13

## 2022-05-20 RX ADMIN — DULOXETINE HYDROCHLORIDE 60 MG: 60 CAPSULE, DELAYED RELEASE ORAL at 08:16

## 2022-05-20 RX ADMIN — PERPHENAZINE 8 MG: 2 TABLET, FILM COATED ORAL at 21:43

## 2022-05-20 RX ADMIN — PANTOPRAZOLE SODIUM 40 MG: 40 TABLET, DELAYED RELEASE ORAL at 08:17

## 2022-05-20 RX ADMIN — TAMSULOSIN HYDROCHLORIDE 0.8 MG: 0.4 CAPSULE ORAL at 08:16

## 2022-05-20 RX ADMIN — VALACYCLOVIR HYDROCHLORIDE 1000 MG: 500 TABLET, FILM COATED ORAL at 17:01

## 2022-05-20 RX ADMIN — ENOXAPARIN SODIUM 40 MG: 40 INJECTION SUBCUTANEOUS at 08:16

## 2022-05-20 RX ADMIN — VALACYCLOVIR HYDROCHLORIDE 1000 MG: 500 TABLET, FILM COATED ORAL at 06:14

## 2022-05-20 RX ADMIN — SENNOSIDES AND DOCUSATE SODIUM 2 TABLET: 50; 8.6 TABLET ORAL at 21:45

## 2022-05-20 RX ADMIN — VALACYCLOVIR HYDROCHLORIDE 1000 MG: 500 TABLET, FILM COATED ORAL at 21:44

## 2022-05-20 RX ADMIN — PANTOPRAZOLE SODIUM 40 MG: 40 INJECTION, POWDER, FOR SOLUTION INTRAVENOUS at 21:43

## 2022-05-20 RX ADMIN — LITHIUM CARBONATE 300 MG: 300 CAPSULE ORAL at 17:02

## 2022-05-20 RX ADMIN — PERPHENAZINE 4 MG: 2 TABLET, FILM COATED ORAL at 06:13

## 2022-05-20 RX ADMIN — SENNOSIDES AND DOCUSATE SODIUM 2 TABLET: 50; 8.6 TABLET ORAL at 08:16

## 2022-05-20 RX ADMIN — SODIUM CHLORIDE 1 G: 900 INJECTION INTRAVENOUS at 08:18

## 2022-05-20 RX ADMIN — THIAMINE HCL TAB 100 MG 100 MG: 100 TAB at 08:17

## 2022-05-20 RX ADMIN — LITHIUM CARBONATE 300 MG: 300 CAPSULE ORAL at 21:45

## 2022-05-20 RX ADMIN — Medication 10 ML: at 08:19

## 2022-05-21 LAB
ANION GAP SERPL CALCULATED.3IONS-SCNC: 9 MMOL/L (ref 5–15)
BACTERIA SPEC AEROBE CULT: ABNORMAL
BASOPHILS # BLD AUTO: 0.03 10*3/MM3 (ref 0–0.2)
BASOPHILS NFR BLD AUTO: 0.3 % (ref 0–1.5)
BUN SERPL-MCNC: 21 MG/DL (ref 6–20)
BUN/CREAT SERPL: 29.2 (ref 7–25)
CALCIUM SPEC-SCNC: 10.6 MG/DL (ref 8.6–10.5)
CHLORIDE SERPL-SCNC: 111 MMOL/L (ref 98–107)
CO2 SERPL-SCNC: 25 MMOL/L (ref 22–29)
CREAT SERPL-MCNC: 0.72 MG/DL (ref 0.57–1)
DEPRECATED RDW RBC AUTO: 51.7 FL (ref 37–54)
EGFRCR SERPLBLD CKD-EPI 2021: 102.6 ML/MIN/1.73
EOSINOPHIL # BLD AUTO: 0.1 10*3/MM3 (ref 0–0.4)
EOSINOPHIL NFR BLD AUTO: 0.9 % (ref 0.3–6.2)
ERYTHROCYTE [DISTWIDTH] IN BLOOD BY AUTOMATED COUNT: 16.6 % (ref 12.3–15.4)
GLUCOSE BLDC GLUCOMTR-MCNC: 135 MG/DL (ref 70–130)
GLUCOSE BLDC GLUCOMTR-MCNC: 137 MG/DL (ref 70–130)
GLUCOSE BLDC GLUCOMTR-MCNC: 139 MG/DL (ref 70–130)
GLUCOSE SERPL-MCNC: 145 MG/DL (ref 65–99)
HCT VFR BLD AUTO: 43.5 % (ref 34–46.6)
HGB BLD-MCNC: 14 G/DL (ref 12–15.9)
IMM GRANULOCYTES # BLD AUTO: 0.03 10*3/MM3 (ref 0–0.05)
IMM GRANULOCYTES NFR BLD AUTO: 0.3 % (ref 0–0.5)
LITHIUM SERPL-SCNC: 0.6 MMOL/L (ref 0.6–1.2)
LYMPHOCYTES # BLD AUTO: 1.8 10*3/MM3 (ref 0.7–3.1)
LYMPHOCYTES NFR BLD AUTO: 15.9 % (ref 19.6–45.3)
MCH RBC QN AUTO: 27.3 PG (ref 26.6–33)
MCHC RBC AUTO-ENTMCNC: 32.2 G/DL (ref 31.5–35.7)
MCV RBC AUTO: 85 FL (ref 79–97)
MONOCYTES # BLD AUTO: 0.74 10*3/MM3 (ref 0.1–0.9)
MONOCYTES NFR BLD AUTO: 6.5 % (ref 5–12)
NEUTROPHILS NFR BLD AUTO: 76.1 % (ref 42.7–76)
NEUTROPHILS NFR BLD AUTO: 8.61 10*3/MM3 (ref 1.7–7)
NRBC BLD AUTO-RTO: 0 /100 WBC (ref 0–0.2)
PLATELET # BLD AUTO: 386 10*3/MM3 (ref 140–450)
PMV BLD AUTO: 10.9 FL (ref 6–12)
POTASSIUM SERPL-SCNC: 4.2 MMOL/L (ref 3.5–5.2)
RBC # BLD AUTO: 5.12 10*6/MM3 (ref 3.77–5.28)
RPR SER QL: NORMAL
SODIUM SERPL-SCNC: 145 MMOL/L (ref 136–145)
WBC NRBC COR # BLD: 11.31 10*3/MM3 (ref 3.4–10.8)

## 2022-05-21 PROCEDURE — 99233 SBSQ HOSP IP/OBS HIGH 50: CPT | Performed by: PSYCHIATRY & NEUROLOGY

## 2022-05-21 PROCEDURE — 99232 SBSQ HOSP IP/OBS MODERATE 35: CPT | Performed by: INTERNAL MEDICINE

## 2022-05-21 PROCEDURE — 25010000002 ENOXAPARIN PER 10 MG: Performed by: STUDENT IN AN ORGANIZED HEALTH CARE EDUCATION/TRAINING PROGRAM

## 2022-05-21 PROCEDURE — 85025 COMPLETE CBC W/AUTO DIFF WBC: CPT | Performed by: INTERNAL MEDICINE

## 2022-05-21 PROCEDURE — 80048 BASIC METABOLIC PNL TOTAL CA: CPT | Performed by: INTERNAL MEDICINE

## 2022-05-21 PROCEDURE — 82962 GLUCOSE BLOOD TEST: CPT

## 2022-05-21 PROCEDURE — 80178 ASSAY OF LITHIUM: CPT | Performed by: INTERNAL MEDICINE

## 2022-05-21 PROCEDURE — 25010000002 CEFTRIAXONE PER 250 MG: Performed by: INTERNAL MEDICINE

## 2022-05-21 PROCEDURE — 92610 EVALUATE SWALLOWING FUNCTION: CPT

## 2022-05-21 RX ORDER — FOLIC ACID 1 MG/1
1 TABLET ORAL DAILY
Status: DISCONTINUED | OUTPATIENT
Start: 2022-05-21 | End: 2022-05-21

## 2022-05-21 RX ORDER — CLONAZEPAM 1 MG/1
1 TABLET ORAL 2 TIMES DAILY PRN
Status: DISPENSED | OUTPATIENT
Start: 2022-05-21 | End: 2022-05-22

## 2022-05-21 RX ORDER — FOLIC ACID 1 MG/1
1 TABLET ORAL DAILY
Status: DISCONTINUED | OUTPATIENT
Start: 2022-05-21 | End: 2022-05-22

## 2022-05-21 RX ADMIN — HYDRALAZINE HYDROCHLORIDE 25 MG: 25 TABLET, FILM COATED ORAL at 14:07

## 2022-05-21 RX ADMIN — POLYETHYLENE GLYCOL (3350) 17 G: 17 POWDER, FOR SOLUTION ORAL at 02:20

## 2022-05-21 RX ADMIN — Medication 10 MG: at 22:51

## 2022-05-21 RX ADMIN — METOPROLOL TARTRATE 50 MG: 50 TABLET, FILM COATED ORAL at 10:16

## 2022-05-21 RX ADMIN — FOLIC ACID 1 MG: 1 TABLET ORAL at 14:07

## 2022-05-21 RX ADMIN — AMLODIPINE BESYLATE 10 MG: 10 TABLET ORAL at 10:16

## 2022-05-21 RX ADMIN — ENOXAPARIN SODIUM 40 MG: 40 INJECTION SUBCUTANEOUS at 10:15

## 2022-05-21 RX ADMIN — PERPHENAZINE 8 MG: 2 TABLET, FILM COATED ORAL at 22:51

## 2022-05-21 RX ADMIN — PERPHENAZINE 8 MG: 2 TABLET, FILM COATED ORAL at 05:17

## 2022-05-21 RX ADMIN — THIAMINE HCL TAB 100 MG 100 MG: 100 TAB at 10:15

## 2022-05-21 RX ADMIN — LITHIUM CARBONATE 300 MG: 300 CAPSULE ORAL at 10:17

## 2022-05-21 RX ADMIN — VALACYCLOVIR HYDROCHLORIDE 1000 MG: 500 TABLET, FILM COATED ORAL at 05:17

## 2022-05-21 RX ADMIN — LEVOTHYROXINE SODIUM 88 MCG: 88 TABLET ORAL at 05:26

## 2022-05-21 RX ADMIN — PANTOPRAZOLE SODIUM 40 MG: 40 INJECTION, POWDER, FOR SOLUTION INTRAVENOUS at 10:17

## 2022-05-21 RX ADMIN — METOPROLOL TARTRATE 50 MG: 50 TABLET, FILM COATED ORAL at 22:52

## 2022-05-21 RX ADMIN — PERPHENAZINE 8 MG: 2 TABLET, FILM COATED ORAL at 14:08

## 2022-05-21 RX ADMIN — PANTOPRAZOLE SODIUM 40 MG: 40 INJECTION, POWDER, FOR SOLUTION INTRAVENOUS at 22:52

## 2022-05-21 RX ADMIN — HYDRALAZINE HYDROCHLORIDE 25 MG: 25 TABLET, FILM COATED ORAL at 22:51

## 2022-05-21 RX ADMIN — SENNOSIDES AND DOCUSATE SODIUM 2 TABLET: 50; 8.6 TABLET ORAL at 22:51

## 2022-05-21 RX ADMIN — HYDRALAZINE HYDROCHLORIDE 25 MG: 25 TABLET, FILM COATED ORAL at 05:17

## 2022-05-21 RX ADMIN — LITHIUM CARBONATE 300 MG: 300 CAPSULE ORAL at 22:51

## 2022-05-21 RX ADMIN — VALACYCLOVIR HYDROCHLORIDE 1000 MG: 500 TABLET, FILM COATED ORAL at 22:52

## 2022-05-21 RX ADMIN — Medication 10 ML: at 10:18

## 2022-05-21 RX ADMIN — VALACYCLOVIR HYDROCHLORIDE 1000 MG: 500 TABLET, FILM COATED ORAL at 15:48

## 2022-05-21 RX ADMIN — DULOXETINE HYDROCHLORIDE 60 MG: 60 CAPSULE, DELAYED RELEASE ORAL at 10:15

## 2022-05-21 RX ADMIN — SODIUM CHLORIDE 1 G: 900 INJECTION INTRAVENOUS at 10:20

## 2022-05-21 RX ADMIN — SENNOSIDES AND DOCUSATE SODIUM 2 TABLET: 50; 8.6 TABLET ORAL at 10:15

## 2022-05-21 RX ADMIN — CLONAZEPAM 1 MG: 0.5 TABLET ORAL at 05:17

## 2022-05-21 RX ADMIN — Medication 10 ML: at 22:53

## 2022-05-21 RX ADMIN — CLONAZEPAM 1 MG: 0.5 TABLET ORAL at 15:48

## 2022-05-21 RX ADMIN — CLONAZEPAM 1 MG: 0.5 TABLET ORAL at 22:51

## 2022-05-22 ENCOUNTER — APPOINTMENT (OUTPATIENT)
Dept: GENERAL RADIOLOGY | Facility: HOSPITAL | Age: 47
End: 2022-05-22

## 2022-05-22 LAB
ANION GAP SERPL CALCULATED.3IONS-SCNC: 13 MMOL/L (ref 5–15)
BACTERIA SPEC ANAEROBE CULT: NORMAL
BASOPHILS # BLD AUTO: 0.06 10*3/MM3 (ref 0–0.2)
BASOPHILS NFR BLD AUTO: 0.5 % (ref 0–1.5)
BUN SERPL-MCNC: 15 MG/DL (ref 6–20)
BUN/CREAT SERPL: 21.7 (ref 7–25)
CALCIUM SPEC-SCNC: 10.7 MG/DL (ref 8.6–10.5)
CHLORIDE SERPL-SCNC: 105 MMOL/L (ref 98–107)
CO2 SERPL-SCNC: 22 MMOL/L (ref 22–29)
CREAT SERPL-MCNC: 0.69 MG/DL (ref 0.57–1)
DEPRECATED RDW RBC AUTO: 51.7 FL (ref 37–54)
EGFRCR SERPLBLD CKD-EPI 2021: 106.5 ML/MIN/1.73
EOSINOPHIL # BLD AUTO: 0.15 10*3/MM3 (ref 0–0.4)
EOSINOPHIL NFR BLD AUTO: 1.2 % (ref 0.3–6.2)
ERYTHROCYTE [DISTWIDTH] IN BLOOD BY AUTOMATED COUNT: 17.2 % (ref 12.3–15.4)
GLUCOSE BLDC GLUCOMTR-MCNC: 104 MG/DL (ref 70–130)
GLUCOSE SERPL-MCNC: 140 MG/DL (ref 65–99)
HCT VFR BLD AUTO: 46.3 % (ref 34–46.6)
HGB BLD-MCNC: 14.9 G/DL (ref 12–15.9)
IMM GRANULOCYTES # BLD AUTO: 0.04 10*3/MM3 (ref 0–0.05)
IMM GRANULOCYTES NFR BLD AUTO: 0.3 % (ref 0–0.5)
LITHIUM SERPL-SCNC: 0.7 MMOL/L (ref 0.6–1.2)
LYMPHOCYTES # BLD AUTO: 2.83 10*3/MM3 (ref 0.7–3.1)
LYMPHOCYTES NFR BLD AUTO: 22.7 % (ref 19.6–45.3)
MCH RBC QN AUTO: 27.2 PG (ref 26.6–33)
MCHC RBC AUTO-ENTMCNC: 32.2 G/DL (ref 31.5–35.7)
MCV RBC AUTO: 84.5 FL (ref 79–97)
MONOCYTES # BLD AUTO: 0.94 10*3/MM3 (ref 0.1–0.9)
MONOCYTES NFR BLD AUTO: 7.5 % (ref 5–12)
NEUTROPHILS NFR BLD AUTO: 67.8 % (ref 42.7–76)
NEUTROPHILS NFR BLD AUTO: 8.47 10*3/MM3 (ref 1.7–7)
NRBC BLD AUTO-RTO: 0 /100 WBC (ref 0–0.2)
PLATELET # BLD AUTO: 464 10*3/MM3 (ref 140–450)
PMV BLD AUTO: 11.3 FL (ref 6–12)
POTASSIUM SERPL-SCNC: 4.4 MMOL/L (ref 3.5–5.2)
RBC # BLD AUTO: 5.48 10*6/MM3 (ref 3.77–5.28)
SODIUM SERPL-SCNC: 140 MMOL/L (ref 136–145)
WBC NRBC COR # BLD: 12.49 10*3/MM3 (ref 3.4–10.8)

## 2022-05-22 PROCEDURE — 81001 URINALYSIS AUTO W/SCOPE: CPT | Performed by: INTERNAL MEDICINE

## 2022-05-22 PROCEDURE — 82962 GLUCOSE BLOOD TEST: CPT

## 2022-05-22 PROCEDURE — 80048 BASIC METABOLIC PNL TOTAL CA: CPT | Performed by: INTERNAL MEDICINE

## 2022-05-22 PROCEDURE — 99231 SBSQ HOSP IP/OBS SF/LOW 25: CPT | Performed by: INTERNAL MEDICINE

## 2022-05-22 PROCEDURE — 71045 X-RAY EXAM CHEST 1 VIEW: CPT

## 2022-05-22 PROCEDURE — 85025 COMPLETE CBC W/AUTO DIFF WBC: CPT | Performed by: INTERNAL MEDICINE

## 2022-05-22 PROCEDURE — 25010000002 ENOXAPARIN PER 10 MG: Performed by: STUDENT IN AN ORGANIZED HEALTH CARE EDUCATION/TRAINING PROGRAM

## 2022-05-22 PROCEDURE — 80178 ASSAY OF LITHIUM: CPT | Performed by: PSYCHIATRY & NEUROLOGY

## 2022-05-22 PROCEDURE — 83605 ASSAY OF LACTIC ACID: CPT | Performed by: INTERNAL MEDICINE

## 2022-05-22 PROCEDURE — 87040 BLOOD CULTURE FOR BACTERIA: CPT | Performed by: INTERNAL MEDICINE

## 2022-05-22 PROCEDURE — 87086 URINE CULTURE/COLONY COUNT: CPT | Performed by: INTERNAL MEDICINE

## 2022-05-22 RX ORDER — FOLIC ACID 1 MG/1
1 TABLET ORAL DAILY
Status: DISCONTINUED | OUTPATIENT
Start: 2022-05-23 | End: 2022-05-28 | Stop reason: HOSPADM

## 2022-05-22 RX ORDER — BISACODYL 10 MG
10 SUPPOSITORY, RECTAL RECTAL DAILY PRN
Status: DISCONTINUED | OUTPATIENT
Start: 2022-05-22 | End: 2022-05-25

## 2022-05-22 RX ORDER — HYDROCODONE BITARTRATE AND ACETAMINOPHEN 5; 325 MG/1; MG/1
1 TABLET ORAL EVERY 6 HOURS PRN
Status: DISCONTINUED | OUTPATIENT
Start: 2022-05-22 | End: 2022-05-23

## 2022-05-22 RX ORDER — ACETAMINOPHEN 325 MG/1
650 TABLET ORAL EVERY 4 HOURS PRN
Status: DISCONTINUED | OUTPATIENT
Start: 2022-05-22 | End: 2022-05-28 | Stop reason: HOSPADM

## 2022-05-22 RX ORDER — LITHIUM CARBONATE 300 MG/1
300 CAPSULE ORAL 2 TIMES DAILY
Status: DISCONTINUED | OUTPATIENT
Start: 2022-05-23 | End: 2022-05-28 | Stop reason: HOSPADM

## 2022-05-22 RX ORDER — PERPHENAZINE 2 MG/1
8 TABLET ORAL EVERY 8 HOURS SCHEDULED
Status: DISCONTINUED | OUTPATIENT
Start: 2022-05-23 | End: 2022-05-28 | Stop reason: HOSPADM

## 2022-05-22 RX ORDER — POTASSIUM CHLORIDE 1.5 G/1.77G
40 POWDER, FOR SOLUTION ORAL AS NEEDED
Status: DISCONTINUED | OUTPATIENT
Start: 2022-05-22 | End: 2022-05-28 | Stop reason: HOSPADM

## 2022-05-22 RX ORDER — CLONAZEPAM 1 MG/1
1 TABLET ORAL 2 TIMES DAILY PRN
Status: DISCONTINUED | OUTPATIENT
Start: 2022-05-22 | End: 2022-05-23

## 2022-05-22 RX ORDER — HYDRALAZINE HYDROCHLORIDE 10 MG/1
10 TABLET, FILM COATED ORAL EVERY 6 HOURS PRN
Status: DISCONTINUED | OUTPATIENT
Start: 2022-05-22 | End: 2022-05-28 | Stop reason: HOSPADM

## 2022-05-22 RX ORDER — CLONIDINE HYDROCHLORIDE 0.1 MG/1
0.1 TABLET ORAL
Status: DISCONTINUED | OUTPATIENT
Start: 2022-05-22 | End: 2022-05-25

## 2022-05-22 RX ORDER — METOPROLOL TARTRATE 50 MG/1
50 TABLET, FILM COATED ORAL EVERY 12 HOURS SCHEDULED
Status: DISCONTINUED | OUTPATIENT
Start: 2022-05-23 | End: 2022-05-28 | Stop reason: HOSPADM

## 2022-05-22 RX ORDER — POLYETHYLENE GLYCOL 3350 17 G/17G
17 POWDER, FOR SOLUTION ORAL DAILY PRN
Status: DISCONTINUED | OUTPATIENT
Start: 2022-05-22 | End: 2022-05-25

## 2022-05-22 RX ORDER — HYDRALAZINE HYDROCHLORIDE 25 MG/1
25 TABLET, FILM COATED ORAL EVERY 8 HOURS SCHEDULED
Status: DISCONTINUED | OUTPATIENT
Start: 2022-05-23 | End: 2022-05-28 | Stop reason: HOSPADM

## 2022-05-22 RX ORDER — ONDANSETRON 2 MG/ML
4 INJECTION INTRAMUSCULAR; INTRAVENOUS EVERY 6 HOURS PRN
Status: DISCONTINUED | OUTPATIENT
Start: 2022-05-22 | End: 2022-05-28 | Stop reason: HOSPADM

## 2022-05-22 RX ORDER — POTASSIUM CHLORIDE 7.45 MG/ML
10 INJECTION INTRAVENOUS
Status: DISCONTINUED | OUTPATIENT
Start: 2022-05-22 | End: 2022-05-28 | Stop reason: HOSPADM

## 2022-05-22 RX ORDER — LEVOTHYROXINE SODIUM 88 UG/1
88 TABLET ORAL
Status: DISCONTINUED | OUTPATIENT
Start: 2022-05-23 | End: 2022-05-28 | Stop reason: HOSPADM

## 2022-05-22 RX ORDER — AMLODIPINE BESYLATE 10 MG/1
10 TABLET ORAL
Status: DISCONTINUED | OUTPATIENT
Start: 2022-05-23 | End: 2022-05-28 | Stop reason: HOSPADM

## 2022-05-22 RX ORDER — VALACYCLOVIR HYDROCHLORIDE 500 MG/1
1000 TABLET, FILM COATED ORAL EVERY 8 HOURS SCHEDULED
Status: DISCONTINUED | OUTPATIENT
Start: 2022-05-23 | End: 2022-05-28 | Stop reason: HOSPADM

## 2022-05-22 RX ORDER — ACETAMINOPHEN 650 MG/1
650 SUPPOSITORY RECTAL EVERY 4 HOURS PRN
Status: DISCONTINUED | OUTPATIENT
Start: 2022-05-22 | End: 2022-05-28 | Stop reason: HOSPADM

## 2022-05-22 RX ORDER — CHOLECALCIFEROL (VITAMIN D3) 125 MCG
10 CAPSULE ORAL NIGHTLY
Status: DISCONTINUED | OUTPATIENT
Start: 2022-05-23 | End: 2022-05-28 | Stop reason: HOSPADM

## 2022-05-22 RX ORDER — ONDANSETRON 4 MG/1
4 TABLET, FILM COATED ORAL EVERY 6 HOURS PRN
Status: DISCONTINUED | OUTPATIENT
Start: 2022-05-22 | End: 2022-05-28 | Stop reason: HOSPADM

## 2022-05-22 RX ORDER — CLONAZEPAM 1 MG/1
1 TABLET ORAL EVERY 8 HOURS SCHEDULED
Status: DISCONTINUED | OUTPATIENT
Start: 2022-05-23 | End: 2022-05-28 | Stop reason: HOSPADM

## 2022-05-22 RX ORDER — ACETAMINOPHEN 160 MG/5ML
650 SOLUTION ORAL EVERY 4 HOURS PRN
Status: DISCONTINUED | OUTPATIENT
Start: 2022-05-22 | End: 2022-05-28 | Stop reason: HOSPADM

## 2022-05-22 RX ORDER — AMOXICILLIN 250 MG
2 CAPSULE ORAL 2 TIMES DAILY
Status: DISCONTINUED | OUTPATIENT
Start: 2022-05-23 | End: 2022-05-25

## 2022-05-22 RX ADMIN — Medication 10 ML: at 21:13

## 2022-05-22 RX ADMIN — HYDROCODONE BITARTRATE AND ACETAMINOPHEN 1 TABLET: 5; 325 TABLET ORAL at 08:40

## 2022-05-22 RX ADMIN — LITHIUM CARBONATE 300 MG: 300 CAPSULE ORAL at 08:40

## 2022-05-22 RX ADMIN — VALACYCLOVIR HYDROCHLORIDE 1000 MG: 500 TABLET, FILM COATED ORAL at 14:56

## 2022-05-22 RX ADMIN — CLONAZEPAM 1 MG: 0.5 TABLET ORAL at 21:11

## 2022-05-22 RX ADMIN — Medication 10 ML: at 08:41

## 2022-05-22 RX ADMIN — HYDRALAZINE HYDROCHLORIDE 25 MG: 25 TABLET, FILM COATED ORAL at 14:56

## 2022-05-22 RX ADMIN — AMLODIPINE BESYLATE 10 MG: 10 TABLET ORAL at 08:40

## 2022-05-22 RX ADMIN — LITHIUM CARBONATE 300 MG: 300 CAPSULE ORAL at 21:12

## 2022-05-22 RX ADMIN — METOPROLOL TARTRATE 50 MG: 50 TABLET, FILM COATED ORAL at 08:40

## 2022-05-22 RX ADMIN — SENNOSIDES AND DOCUSATE SODIUM 2 TABLET: 50; 8.6 TABLET ORAL at 08:40

## 2022-05-22 RX ADMIN — PANTOPRAZOLE SODIUM 40 MG: 40 INJECTION, POWDER, FOR SOLUTION INTRAVENOUS at 21:12

## 2022-05-22 RX ADMIN — VALACYCLOVIR HYDROCHLORIDE 1000 MG: 500 TABLET, FILM COATED ORAL at 21:11

## 2022-05-22 RX ADMIN — PERPHENAZINE 8 MG: 2 TABLET, FILM COATED ORAL at 21:12

## 2022-05-22 RX ADMIN — CLONAZEPAM 1 MG: 0.5 TABLET ORAL at 14:56

## 2022-05-22 RX ADMIN — THIAMINE HCL TAB 100 MG 100 MG: 100 TAB at 08:41

## 2022-05-22 RX ADMIN — PERPHENAZINE 8 MG: 2 TABLET, FILM COATED ORAL at 14:56

## 2022-05-22 RX ADMIN — Medication 10 MG: at 21:11

## 2022-05-22 RX ADMIN — ENOXAPARIN SODIUM 40 MG: 40 INJECTION SUBCUTANEOUS at 08:41

## 2022-05-22 RX ADMIN — HYDRALAZINE HYDROCHLORIDE 25 MG: 25 TABLET, FILM COATED ORAL at 05:56

## 2022-05-22 RX ADMIN — DULOXETINE HYDROCHLORIDE 60 MG: 60 CAPSULE, DELAYED RELEASE ORAL at 08:40

## 2022-05-22 RX ADMIN — PANTOPRAZOLE SODIUM 40 MG: 40 INJECTION, POWDER, FOR SOLUTION INTRAVENOUS at 08:41

## 2022-05-22 RX ADMIN — HYDRALAZINE HYDROCHLORIDE 25 MG: 25 TABLET, FILM COATED ORAL at 21:12

## 2022-05-22 RX ADMIN — SENNOSIDES AND DOCUSATE SODIUM 2 TABLET: 50; 8.6 TABLET ORAL at 21:12

## 2022-05-22 RX ADMIN — FOLIC ACID 1 MG: 1 TABLET ORAL at 08:41

## 2022-05-22 RX ADMIN — VALACYCLOVIR HYDROCHLORIDE 1000 MG: 500 TABLET, FILM COATED ORAL at 05:56

## 2022-05-22 RX ADMIN — CLONAZEPAM 1 MG: 0.5 TABLET ORAL at 05:56

## 2022-05-22 RX ADMIN — PERPHENAZINE 8 MG: 2 TABLET, FILM COATED ORAL at 05:56

## 2022-05-22 RX ADMIN — METOPROLOL TARTRATE 50 MG: 50 TABLET, FILM COATED ORAL at 21:12

## 2022-05-22 RX ADMIN — LEVOTHYROXINE SODIUM 88 MCG: 88 TABLET ORAL at 05:56

## 2022-05-23 LAB
BACTERIA UR QL AUTO: ABNORMAL /HPF
BASOPHILS # BLD AUTO: 0.04 10*3/MM3 (ref 0–0.2)
BASOPHILS NFR BLD AUTO: 0.4 % (ref 0–1.5)
BILIRUB UR QL STRIP: NEGATIVE
CLARITY UR: ABNORMAL
COLOR UR: YELLOW
D-LACTATE SERPL-SCNC: 0.7 MMOL/L (ref 0.5–2)
DEPRECATED RDW RBC AUTO: 50.9 FL (ref 37–54)
EOSINOPHIL # BLD AUTO: 0.16 10*3/MM3 (ref 0–0.4)
EOSINOPHIL NFR BLD AUTO: 1.5 % (ref 0.3–6.2)
ERYTHROCYTE [DISTWIDTH] IN BLOOD BY AUTOMATED COUNT: 16.8 % (ref 12.3–15.4)
GLUCOSE UR STRIP-MCNC: NEGATIVE MG/DL
HCT VFR BLD AUTO: 44.8 % (ref 34–46.6)
HGB BLD-MCNC: 14.5 G/DL (ref 12–15.9)
HGB UR QL STRIP.AUTO: NEGATIVE
HYALINE CASTS UR QL AUTO: ABNORMAL /LPF
IMM GRANULOCYTES # BLD AUTO: 0.04 10*3/MM3 (ref 0–0.05)
IMM GRANULOCYTES NFR BLD AUTO: 0.4 % (ref 0–0.5)
KETONES UR QL STRIP: NEGATIVE
LEUKOCYTE ESTERASE UR QL STRIP.AUTO: ABNORMAL
LYMPHOCYTES # BLD AUTO: 2.28 10*3/MM3 (ref 0.7–3.1)
LYMPHOCYTES NFR BLD AUTO: 21.7 % (ref 19.6–45.3)
MCH RBC QN AUTO: 27.4 PG (ref 26.6–33)
MCHC RBC AUTO-ENTMCNC: 32.4 G/DL (ref 31.5–35.7)
MCV RBC AUTO: 84.5 FL (ref 79–97)
MONOCYTES # BLD AUTO: 0.88 10*3/MM3 (ref 0.1–0.9)
MONOCYTES NFR BLD AUTO: 8.4 % (ref 5–12)
NEUTROPHILS NFR BLD AUTO: 67.6 % (ref 42.7–76)
NEUTROPHILS NFR BLD AUTO: 7.12 10*3/MM3 (ref 1.7–7)
NITRITE UR QL STRIP: NEGATIVE
NRBC BLD AUTO-RTO: 0 /100 WBC (ref 0–0.2)
PH UR STRIP.AUTO: 7 [PH] (ref 5–8)
PLATELET # BLD AUTO: 450 10*3/MM3 (ref 140–450)
PMV BLD AUTO: 11.7 FL (ref 6–12)
PROT UR QL STRIP: NEGATIVE
RBC # BLD AUTO: 5.3 10*6/MM3 (ref 3.77–5.28)
RBC # UR STRIP: ABNORMAL /HPF
REF LAB TEST METHOD: ABNORMAL
SP GR UR STRIP: 1.01 (ref 1–1.03)
SQUAMOUS #/AREA URNS HPF: ABNORMAL /HPF
UROBILINOGEN UR QL STRIP: ABNORMAL
WBC # UR STRIP: ABNORMAL /HPF
WBC NRBC COR # BLD: 10.52 10*3/MM3 (ref 3.4–10.8)

## 2022-05-23 PROCEDURE — 85025 COMPLETE CBC W/AUTO DIFF WBC: CPT | Performed by: INTERNAL MEDICINE

## 2022-05-23 PROCEDURE — 99232 SBSQ HOSP IP/OBS MODERATE 35: CPT | Performed by: INTERNAL MEDICINE

## 2022-05-23 PROCEDURE — 97530 THERAPEUTIC ACTIVITIES: CPT

## 2022-05-23 PROCEDURE — 97110 THERAPEUTIC EXERCISES: CPT

## 2022-05-23 PROCEDURE — 25010000002 ENOXAPARIN PER 10 MG: Performed by: STUDENT IN AN ORGANIZED HEALTH CARE EDUCATION/TRAINING PROGRAM

## 2022-05-23 PROCEDURE — 99232 SBSQ HOSP IP/OBS MODERATE 35: CPT | Performed by: PSYCHIATRY & NEUROLOGY

## 2022-05-23 RX ORDER — PANTOPRAZOLE SODIUM 40 MG/1
40 TABLET, DELAYED RELEASE ORAL
Status: DISCONTINUED | OUTPATIENT
Start: 2022-05-23 | End: 2022-05-28 | Stop reason: HOSPADM

## 2022-05-23 RX ORDER — HYDROCODONE BITARTRATE AND ACETAMINOPHEN 5; 325 MG/1; MG/1
1 TABLET ORAL EVERY 6 HOURS PRN
Status: DISPENSED | OUTPATIENT
Start: 2022-05-23 | End: 2022-05-25

## 2022-05-23 RX ORDER — CLONAZEPAM 0.5 MG/1
1 TABLET ORAL 2 TIMES DAILY PRN
Status: DISPENSED | OUTPATIENT
Start: 2022-05-23 | End: 2022-05-23

## 2022-05-23 RX ADMIN — ENOXAPARIN SODIUM 40 MG: 40 INJECTION SUBCUTANEOUS at 08:20

## 2022-05-23 RX ADMIN — HYDROCODONE BITARTRATE AND ACETAMINOPHEN 1 TABLET: 5; 325 TABLET ORAL at 09:21

## 2022-05-23 RX ADMIN — CLONAZEPAM 1 MG: 0.5 TABLET ORAL at 10:08

## 2022-05-23 RX ADMIN — VALACYCLOVIR HYDROCHLORIDE 1000 MG: 500 TABLET, FILM COATED ORAL at 21:02

## 2022-05-23 RX ADMIN — SENNOSIDES AND DOCUSATE SODIUM 2 TABLET: 50; 8.6 TABLET ORAL at 08:20

## 2022-05-23 RX ADMIN — Medication 10 MG: at 21:02

## 2022-05-23 RX ADMIN — PERPHENAZINE 8 MG: 2 TABLET, FILM COATED ORAL at 14:30

## 2022-05-23 RX ADMIN — CLONAZEPAM 1 MG: 1 TABLET ORAL at 14:29

## 2022-05-23 RX ADMIN — LITHIUM CARBONATE 300 MG: 300 CAPSULE ORAL at 08:20

## 2022-05-23 RX ADMIN — LITHIUM CARBONATE 300 MG: 300 CAPSULE ORAL at 21:02

## 2022-05-23 RX ADMIN — HYDRALAZINE HYDROCHLORIDE 25 MG: 25 TABLET ORAL at 14:30

## 2022-05-23 RX ADMIN — Medication 10 ML: at 21:06

## 2022-05-23 RX ADMIN — LEVOTHYROXINE SODIUM 88 MCG: 88 TABLET ORAL at 06:19

## 2022-05-23 RX ADMIN — PANTOPRAZOLE SODIUM 40 MG: 40 INJECTION, POWDER, FOR SOLUTION INTRAVENOUS at 08:20

## 2022-05-23 RX ADMIN — VALACYCLOVIR HYDROCHLORIDE 1000 MG: 500 TABLET, FILM COATED ORAL at 06:19

## 2022-05-23 RX ADMIN — AMLODIPINE BESYLATE 10 MG: 10 TABLET ORAL at 08:20

## 2022-05-23 RX ADMIN — FOLIC ACID 1 MG: 1 TABLET ORAL at 08:21

## 2022-05-23 RX ADMIN — VALACYCLOVIR HYDROCHLORIDE 1000 MG: 500 TABLET, FILM COATED ORAL at 14:30

## 2022-05-23 RX ADMIN — PERPHENAZINE 8 MG: 2 TABLET, FILM COATED ORAL at 21:02

## 2022-05-23 RX ADMIN — CLONAZEPAM 1 MG: 1 TABLET ORAL at 06:19

## 2022-05-23 RX ADMIN — HYDRALAZINE HYDROCHLORIDE 25 MG: 25 TABLET ORAL at 06:19

## 2022-05-23 RX ADMIN — SENNOSIDES AND DOCUSATE SODIUM 2 TABLET: 50; 8.6 TABLET ORAL at 21:02

## 2022-05-23 RX ADMIN — METOPROLOL TARTRATE 50 MG: 50 TABLET, FILM COATED ORAL at 21:05

## 2022-05-23 RX ADMIN — THIAMINE HCL TAB 100 MG 100 MG: 100 TAB at 08:20

## 2022-05-23 RX ADMIN — METOPROLOL TARTRATE 50 MG: 50 TABLET, FILM COATED ORAL at 08:21

## 2022-05-23 RX ADMIN — PANTOPRAZOLE SODIUM 40 MG: 40 TABLET, DELAYED RELEASE ORAL at 17:14

## 2022-05-23 RX ADMIN — CLONAZEPAM 1 MG: 1 TABLET ORAL at 21:02

## 2022-05-23 RX ADMIN — HYDRALAZINE HYDROCHLORIDE 25 MG: 25 TABLET ORAL at 21:05

## 2022-05-23 RX ADMIN — PERPHENAZINE 8 MG: 2 TABLET, FILM COATED ORAL at 06:19

## 2022-05-23 RX ADMIN — DULOXETINE HYDROCHLORIDE 60 MG: 60 CAPSULE, DELAYED RELEASE ORAL at 08:21

## 2022-05-24 ENCOUNTER — APPOINTMENT (OUTPATIENT)
Dept: GENERAL RADIOLOGY | Facility: HOSPITAL | Age: 47
End: 2022-05-24

## 2022-05-24 LAB
BACTERIA SPEC AEROBE CULT: NO GROWTH
METHYLMALONATE SERPL-SCNC: 180 NMOL/L (ref 0–378)

## 2022-05-24 PROCEDURE — 99231 SBSQ HOSP IP/OBS SF/LOW 25: CPT | Performed by: PSYCHIATRY & NEUROLOGY

## 2022-05-24 PROCEDURE — 92523 SPEECH SOUND LANG COMPREHEN: CPT

## 2022-05-24 PROCEDURE — 92611 MOTION FLUOROSCOPY/SWALLOW: CPT

## 2022-05-24 PROCEDURE — 25010000002 ENOXAPARIN PER 10 MG: Performed by: STUDENT IN AN ORGANIZED HEALTH CARE EDUCATION/TRAINING PROGRAM

## 2022-05-24 PROCEDURE — 74230 X-RAY XM SWLNG FUNCJ C+: CPT

## 2022-05-24 PROCEDURE — 92526 ORAL FUNCTION THERAPY: CPT

## 2022-05-24 PROCEDURE — 99232 SBSQ HOSP IP/OBS MODERATE 35: CPT | Performed by: NURSE PRACTITIONER

## 2022-05-24 RX ORDER — TAMSULOSIN HYDROCHLORIDE 0.4 MG/1
0.4 CAPSULE ORAL DAILY
Status: DISCONTINUED | OUTPATIENT
Start: 2022-05-24 | End: 2022-05-28 | Stop reason: HOSPADM

## 2022-05-24 RX ADMIN — Medication 10 MG: at 21:39

## 2022-05-24 RX ADMIN — PERPHENAZINE 8 MG: 2 TABLET, FILM COATED ORAL at 21:39

## 2022-05-24 RX ADMIN — CLONAZEPAM 1 MG: 1 TABLET ORAL at 06:01

## 2022-05-24 RX ADMIN — SENNOSIDES AND DOCUSATE SODIUM 2 TABLET: 50; 8.6 TABLET ORAL at 21:39

## 2022-05-24 RX ADMIN — PANTOPRAZOLE SODIUM 40 MG: 40 TABLET, DELAYED RELEASE ORAL at 16:59

## 2022-05-24 RX ADMIN — FOLIC ACID 1 MG: 1 TABLET ORAL at 08:37

## 2022-05-24 RX ADMIN — LEVOTHYROXINE SODIUM 88 MCG: 88 TABLET ORAL at 06:01

## 2022-05-24 RX ADMIN — BARIUM SULFATE 20 ML: 400 PASTE ORAL at 11:46

## 2022-05-24 RX ADMIN — DULOXETINE HYDROCHLORIDE 60 MG: 60 CAPSULE, DELAYED RELEASE ORAL at 08:37

## 2022-05-24 RX ADMIN — BARIUM SULFATE 55 ML: 0.81 POWDER, FOR SUSPENSION ORAL at 11:47

## 2022-05-24 RX ADMIN — HYDRALAZINE HYDROCHLORIDE 25 MG: 25 TABLET ORAL at 06:02

## 2022-05-24 RX ADMIN — TAMSULOSIN HYDROCHLORIDE 0.4 MG: 0.4 CAPSULE ORAL at 16:59

## 2022-05-24 RX ADMIN — METOPROLOL TARTRATE 50 MG: 50 TABLET, FILM COATED ORAL at 08:37

## 2022-05-24 RX ADMIN — ENOXAPARIN SODIUM 40 MG: 40 INJECTION SUBCUTANEOUS at 08:37

## 2022-05-24 RX ADMIN — THIAMINE HCL TAB 100 MG 100 MG: 100 TAB at 08:37

## 2022-05-24 RX ADMIN — HYDRALAZINE HYDROCHLORIDE 25 MG: 25 TABLET ORAL at 13:28

## 2022-05-24 RX ADMIN — METOPROLOL TARTRATE 50 MG: 50 TABLET, FILM COATED ORAL at 21:40

## 2022-05-24 RX ADMIN — HYDRALAZINE HYDROCHLORIDE 25 MG: 25 TABLET ORAL at 21:40

## 2022-05-24 RX ADMIN — AMLODIPINE BESYLATE 10 MG: 10 TABLET ORAL at 08:37

## 2022-05-24 RX ADMIN — VALACYCLOVIR HYDROCHLORIDE 1000 MG: 500 TABLET, FILM COATED ORAL at 13:29

## 2022-05-24 RX ADMIN — LITHIUM CARBONATE 300 MG: 300 CAPSULE ORAL at 08:37

## 2022-05-24 RX ADMIN — PERPHENAZINE 8 MG: 2 TABLET, FILM COATED ORAL at 06:01

## 2022-05-24 RX ADMIN — CLONAZEPAM 1 MG: 1 TABLET ORAL at 13:28

## 2022-05-24 RX ADMIN — PERPHENAZINE 8 MG: 2 TABLET, FILM COATED ORAL at 13:29

## 2022-05-24 RX ADMIN — PANTOPRAZOLE SODIUM 40 MG: 40 TABLET, DELAYED RELEASE ORAL at 08:37

## 2022-05-24 RX ADMIN — LITHIUM CARBONATE 300 MG: 300 CAPSULE ORAL at 21:39

## 2022-05-24 RX ADMIN — Medication 10 ML: at 08:37

## 2022-05-24 RX ADMIN — VALACYCLOVIR HYDROCHLORIDE 1000 MG: 500 TABLET, FILM COATED ORAL at 06:01

## 2022-05-24 RX ADMIN — CLONAZEPAM 1 MG: 1 TABLET ORAL at 21:39

## 2022-05-24 RX ADMIN — VALACYCLOVIR HYDROCHLORIDE 1000 MG: 500 TABLET, FILM COATED ORAL at 21:39

## 2022-05-25 PROCEDURE — 97530 THERAPEUTIC ACTIVITIES: CPT

## 2022-05-25 PROCEDURE — 99232 SBSQ HOSP IP/OBS MODERATE 35: CPT | Performed by: HOSPITALIST

## 2022-05-25 PROCEDURE — 99231 SBSQ HOSP IP/OBS SF/LOW 25: CPT | Performed by: PSYCHIATRY & NEUROLOGY

## 2022-05-25 PROCEDURE — 25010000002 ENOXAPARIN PER 10 MG: Performed by: STUDENT IN AN ORGANIZED HEALTH CARE EDUCATION/TRAINING PROGRAM

## 2022-05-25 PROCEDURE — 97110 THERAPEUTIC EXERCISES: CPT

## 2022-05-25 PROCEDURE — 97116 GAIT TRAINING THERAPY: CPT

## 2022-05-25 RX ORDER — LACTULOSE 10 G/15ML
10 SOLUTION ORAL 3 TIMES DAILY
Status: DISCONTINUED | OUTPATIENT
Start: 2022-05-25 | End: 2022-05-28 | Stop reason: HOSPADM

## 2022-05-25 RX ORDER — DOCUSATE SODIUM 100 MG/1
100 CAPSULE, LIQUID FILLED ORAL 2 TIMES DAILY
Status: DISCONTINUED | OUTPATIENT
Start: 2022-05-25 | End: 2022-05-28 | Stop reason: HOSPADM

## 2022-05-25 RX ORDER — POLYETHYLENE GLYCOL 3350 17 G/17G
17 POWDER, FOR SOLUTION ORAL 2 TIMES DAILY
Status: DISCONTINUED | OUTPATIENT
Start: 2022-05-25 | End: 2022-05-28 | Stop reason: HOSPADM

## 2022-05-25 RX ADMIN — CLONAZEPAM 1 MG: 1 TABLET ORAL at 13:39

## 2022-05-25 RX ADMIN — AMLODIPINE BESYLATE 10 MG: 10 TABLET ORAL at 08:21

## 2022-05-25 RX ADMIN — PERPHENAZINE 8 MG: 2 TABLET, FILM COATED ORAL at 13:40

## 2022-05-25 RX ADMIN — PERPHENAZINE 8 MG: 2 TABLET, FILM COATED ORAL at 05:55

## 2022-05-25 RX ADMIN — DOCUSATE SODIUM 100 MG: 100 CAPSULE, LIQUID FILLED ORAL at 20:53

## 2022-05-25 RX ADMIN — HYDRALAZINE HYDROCHLORIDE 25 MG: 25 TABLET ORAL at 05:55

## 2022-05-25 RX ADMIN — LITHIUM CARBONATE 300 MG: 300 CAPSULE ORAL at 08:20

## 2022-05-25 RX ADMIN — PERPHENAZINE 8 MG: 2 TABLET, FILM COATED ORAL at 21:21

## 2022-05-25 RX ADMIN — CLONAZEPAM 1 MG: 1 TABLET ORAL at 21:19

## 2022-05-25 RX ADMIN — METOPROLOL TARTRATE 50 MG: 50 TABLET, FILM COATED ORAL at 08:21

## 2022-05-25 RX ADMIN — LITHIUM CARBONATE 300 MG: 300 CAPSULE ORAL at 20:54

## 2022-05-25 RX ADMIN — LEVOTHYROXINE SODIUM 88 MCG: 88 TABLET ORAL at 05:55

## 2022-05-25 RX ADMIN — DULOXETINE HYDROCHLORIDE 60 MG: 60 CAPSULE, DELAYED RELEASE ORAL at 08:21

## 2022-05-25 RX ADMIN — VALACYCLOVIR HYDROCHLORIDE 1000 MG: 500 TABLET, FILM COATED ORAL at 21:21

## 2022-05-25 RX ADMIN — PANTOPRAZOLE SODIUM 40 MG: 40 TABLET, DELAYED RELEASE ORAL at 08:20

## 2022-05-25 RX ADMIN — LACTULOSE 10 G: 20 SOLUTION ORAL at 22:19

## 2022-05-25 RX ADMIN — HYDRALAZINE HYDROCHLORIDE 25 MG: 25 TABLET ORAL at 13:39

## 2022-05-25 RX ADMIN — HYDRALAZINE HYDROCHLORIDE 25 MG: 25 TABLET ORAL at 20:20

## 2022-05-25 RX ADMIN — METOPROLOL TARTRATE 50 MG: 50 TABLET, FILM COATED ORAL at 20:53

## 2022-05-25 RX ADMIN — Medication 10 MG: at 20:53

## 2022-05-25 RX ADMIN — THIAMINE HCL TAB 100 MG 100 MG: 100 TAB at 08:21

## 2022-05-25 RX ADMIN — CLONAZEPAM 1 MG: 1 TABLET ORAL at 05:55

## 2022-05-25 RX ADMIN — TAMSULOSIN HYDROCHLORIDE 0.4 MG: 0.4 CAPSULE ORAL at 08:21

## 2022-05-25 RX ADMIN — FOLIC ACID 1 MG: 1 TABLET ORAL at 08:21

## 2022-05-25 RX ADMIN — VALACYCLOVIR HYDROCHLORIDE 1000 MG: 500 TABLET, FILM COATED ORAL at 05:55

## 2022-05-25 RX ADMIN — PANTOPRAZOLE SODIUM 40 MG: 40 TABLET, DELAYED RELEASE ORAL at 18:25

## 2022-05-25 RX ADMIN — ENOXAPARIN SODIUM 40 MG: 40 INJECTION SUBCUTANEOUS at 08:22

## 2022-05-25 RX ADMIN — POLYETHYLENE GLYCOL 3350 17 G: 17 POWDER, FOR SOLUTION ORAL at 20:53

## 2022-05-25 RX ADMIN — VALACYCLOVIR HYDROCHLORIDE 1000 MG: 500 TABLET, FILM COATED ORAL at 13:41

## 2022-05-26 LAB — REF LAB TEST RESULTS: NORMAL

## 2022-05-26 PROCEDURE — 25010000002 ENOXAPARIN PER 10 MG: Performed by: STUDENT IN AN ORGANIZED HEALTH CARE EDUCATION/TRAINING PROGRAM

## 2022-05-26 PROCEDURE — 99232 SBSQ HOSP IP/OBS MODERATE 35: CPT | Performed by: HOSPITALIST

## 2022-05-26 PROCEDURE — 92507 TX SP LANG VOICE COMM INDIV: CPT

## 2022-05-26 PROCEDURE — 92526 ORAL FUNCTION THERAPY: CPT

## 2022-05-26 RX ORDER — CLONAZEPAM 1 MG/1
1 TABLET ORAL EVERY 8 HOURS PRN
Status: DISCONTINUED | OUTPATIENT
Start: 2022-05-26 | End: 2022-05-28 | Stop reason: HOSPADM

## 2022-05-26 RX ADMIN — TAMSULOSIN HYDROCHLORIDE 0.4 MG: 0.4 CAPSULE ORAL at 08:52

## 2022-05-26 RX ADMIN — CLONAZEPAM 1 MG: 1 TABLET ORAL at 05:42

## 2022-05-26 RX ADMIN — LEVOTHYROXINE SODIUM 88 MCG: 88 TABLET ORAL at 05:41

## 2022-05-26 RX ADMIN — METOPROLOL TARTRATE 50 MG: 50 TABLET, FILM COATED ORAL at 21:31

## 2022-05-26 RX ADMIN — CLONAZEPAM 1 MG: 1 TABLET ORAL at 19:40

## 2022-05-26 RX ADMIN — ENOXAPARIN SODIUM 40 MG: 40 INJECTION SUBCUTANEOUS at 08:53

## 2022-05-26 RX ADMIN — METOPROLOL TARTRATE 50 MG: 50 TABLET, FILM COATED ORAL at 08:52

## 2022-05-26 RX ADMIN — VALACYCLOVIR HYDROCHLORIDE 1000 MG: 500 TABLET, FILM COATED ORAL at 13:12

## 2022-05-26 RX ADMIN — PERPHENAZINE 8 MG: 2 TABLET, FILM COATED ORAL at 21:33

## 2022-05-26 RX ADMIN — VALACYCLOVIR HYDROCHLORIDE 1000 MG: 500 TABLET, FILM COATED ORAL at 05:41

## 2022-05-26 RX ADMIN — POLYETHYLENE GLYCOL 3350 17 G: 17 POWDER, FOR SOLUTION ORAL at 08:54

## 2022-05-26 RX ADMIN — LACTULOSE 10 G: 20 SOLUTION ORAL at 21:29

## 2022-05-26 RX ADMIN — VALACYCLOVIR HYDROCHLORIDE 1000 MG: 500 TABLET, FILM COATED ORAL at 21:30

## 2022-05-26 RX ADMIN — DOCUSATE SODIUM 100 MG: 100 CAPSULE, LIQUID FILLED ORAL at 08:53

## 2022-05-26 RX ADMIN — PERPHENAZINE 8 MG: 2 TABLET, FILM COATED ORAL at 13:12

## 2022-05-26 RX ADMIN — LACTULOSE 10 G: 20 SOLUTION ORAL at 08:54

## 2022-05-26 RX ADMIN — THIAMINE HCL TAB 100 MG 100 MG: 100 TAB at 08:53

## 2022-05-26 RX ADMIN — HYDRALAZINE HYDROCHLORIDE 25 MG: 25 TABLET ORAL at 05:42

## 2022-05-26 RX ADMIN — FOLIC ACID 1 MG: 1 TABLET ORAL at 08:53

## 2022-05-26 RX ADMIN — CLONAZEPAM 1 MG: 1 TABLET ORAL at 21:32

## 2022-05-26 RX ADMIN — LACTULOSE 10 G: 20 SOLUTION ORAL at 17:00

## 2022-05-26 RX ADMIN — PERPHENAZINE 8 MG: 2 TABLET, FILM COATED ORAL at 05:42

## 2022-05-26 RX ADMIN — HYDRALAZINE HYDROCHLORIDE 25 MG: 25 TABLET ORAL at 21:32

## 2022-05-26 RX ADMIN — LITHIUM CARBONATE 300 MG: 300 CAPSULE ORAL at 08:53

## 2022-05-26 RX ADMIN — AMLODIPINE BESYLATE 10 MG: 10 TABLET ORAL at 09:11

## 2022-05-26 RX ADMIN — PANTOPRAZOLE SODIUM 40 MG: 40 TABLET, DELAYED RELEASE ORAL at 17:06

## 2022-05-26 RX ADMIN — Medication 10 MG: at 21:31

## 2022-05-26 RX ADMIN — CLONAZEPAM 1 MG: 1 TABLET ORAL at 13:12

## 2022-05-26 RX ADMIN — DULOXETINE HYDROCHLORIDE 60 MG: 60 CAPSULE, DELAYED RELEASE ORAL at 08:53

## 2022-05-26 RX ADMIN — LITHIUM CARBONATE 300 MG: 300 CAPSULE ORAL at 21:34

## 2022-05-26 RX ADMIN — POLYETHYLENE GLYCOL 3350 17 G: 17 POWDER, FOR SOLUTION ORAL at 21:35

## 2022-05-26 RX ADMIN — HYDRALAZINE HYDROCHLORIDE 25 MG: 25 TABLET ORAL at 13:12

## 2022-05-26 RX ADMIN — DOCUSATE SODIUM 100 MG: 100 CAPSULE, LIQUID FILLED ORAL at 21:30

## 2022-05-26 RX ADMIN — PANTOPRAZOLE SODIUM 40 MG: 40 TABLET, DELAYED RELEASE ORAL at 08:53

## 2022-05-27 PROBLEM — K59.00 ACUTE CONSTIPATION: Status: ACTIVE | Noted: 2022-05-27

## 2022-05-27 PROCEDURE — 25010000002 ENOXAPARIN PER 10 MG: Performed by: STUDENT IN AN ORGANIZED HEALTH CARE EDUCATION/TRAINING PROGRAM

## 2022-05-27 PROCEDURE — 99231 SBSQ HOSP IP/OBS SF/LOW 25: CPT | Performed by: PSYCHIATRY & NEUROLOGY

## 2022-05-27 PROCEDURE — 99232 SBSQ HOSP IP/OBS MODERATE 35: CPT | Performed by: NURSE PRACTITIONER

## 2022-05-27 RX ORDER — BISACODYL 5 MG/1
5 TABLET, DELAYED RELEASE ORAL DAILY PRN
Status: DISCONTINUED | OUTPATIENT
Start: 2022-05-27 | End: 2022-05-28 | Stop reason: HOSPADM

## 2022-05-27 RX ORDER — BISACODYL 10 MG
10 SUPPOSITORY, RECTAL RECTAL DAILY
Status: DISCONTINUED | OUTPATIENT
Start: 2022-05-27 | End: 2022-05-28 | Stop reason: HOSPADM

## 2022-05-27 RX ORDER — POLYETHYLENE GLYCOL 3350 17 G/17G
17 POWDER, FOR SOLUTION ORAL DAILY PRN
Status: DISCONTINUED | OUTPATIENT
Start: 2022-05-27 | End: 2022-05-28 | Stop reason: HOSPADM

## 2022-05-27 RX ORDER — AMOXICILLIN 250 MG
2 CAPSULE ORAL 2 TIMES DAILY
Status: DISCONTINUED | OUTPATIENT
Start: 2022-05-27 | End: 2022-05-28 | Stop reason: HOSPADM

## 2022-05-27 RX ORDER — MAGNESIUM CARB/ALUMINUM HYDROX 105-160MG
296 TABLET,CHEWABLE ORAL AS NEEDED
Status: DISCONTINUED | OUTPATIENT
Start: 2022-05-27 | End: 2022-05-28 | Stop reason: HOSPADM

## 2022-05-27 RX ADMIN — THIAMINE HCL TAB 100 MG 100 MG: 100 TAB at 09:27

## 2022-05-27 RX ADMIN — METOPROLOL TARTRATE 50 MG: 50 TABLET, FILM COATED ORAL at 21:54

## 2022-05-27 RX ADMIN — HYDRALAZINE HYDROCHLORIDE 25 MG: 25 TABLET ORAL at 06:39

## 2022-05-27 RX ADMIN — AMLODIPINE BESYLATE 10 MG: 10 TABLET ORAL at 09:27

## 2022-05-27 RX ADMIN — VALACYCLOVIR HYDROCHLORIDE 1000 MG: 500 TABLET, FILM COATED ORAL at 21:54

## 2022-05-27 RX ADMIN — SENNOSIDES AND DOCUSATE SODIUM 2 TABLET: 50; 8.6 TABLET ORAL at 21:54

## 2022-05-27 RX ADMIN — LITHIUM CARBONATE 300 MG: 300 CAPSULE ORAL at 09:28

## 2022-05-27 RX ADMIN — LITHIUM CARBONATE 300 MG: 300 CAPSULE ORAL at 21:54

## 2022-05-27 RX ADMIN — CLONAZEPAM 1 MG: 1 TABLET ORAL at 14:55

## 2022-05-27 RX ADMIN — LEVOTHYROXINE SODIUM 88 MCG: 88 TABLET ORAL at 06:39

## 2022-05-27 RX ADMIN — PERPHENAZINE 8 MG: 2 TABLET, FILM COATED ORAL at 06:39

## 2022-05-27 RX ADMIN — SENNOSIDES AND DOCUSATE SODIUM 2 TABLET: 50; 8.6 TABLET ORAL at 11:06

## 2022-05-27 RX ADMIN — DOCUSATE SODIUM 100 MG: 100 CAPSULE, LIQUID FILLED ORAL at 09:27

## 2022-05-27 RX ADMIN — FOLIC ACID 1 MG: 1 TABLET ORAL at 09:27

## 2022-05-27 RX ADMIN — VALACYCLOVIR HYDROCHLORIDE 1000 MG: 500 TABLET, FILM COATED ORAL at 14:55

## 2022-05-27 RX ADMIN — VALACYCLOVIR HYDROCHLORIDE 1000 MG: 500 TABLET, FILM COATED ORAL at 06:44

## 2022-05-27 RX ADMIN — LACTULOSE 10 G: 20 SOLUTION ORAL at 21:53

## 2022-05-27 RX ADMIN — DULOXETINE HYDROCHLORIDE 60 MG: 60 CAPSULE, DELAYED RELEASE ORAL at 09:27

## 2022-05-27 RX ADMIN — LACTULOSE 10 G: 20 SOLUTION ORAL at 09:27

## 2022-05-27 RX ADMIN — POLYETHYLENE GLYCOL 3350 17 G: 17 POWDER, FOR SOLUTION ORAL at 21:53

## 2022-05-27 RX ADMIN — CLONAZEPAM 1 MG: 1 TABLET ORAL at 09:27

## 2022-05-27 RX ADMIN — HYDRALAZINE HYDROCHLORIDE 25 MG: 25 TABLET ORAL at 14:55

## 2022-05-27 RX ADMIN — BISACODYL 10 MG: 10 SUPPOSITORY RECTAL at 11:06

## 2022-05-27 RX ADMIN — PERPHENAZINE 8 MG: 2 TABLET, FILM COATED ORAL at 21:53

## 2022-05-27 RX ADMIN — DOCUSATE SODIUM 100 MG: 100 CAPSULE, LIQUID FILLED ORAL at 21:54

## 2022-05-27 RX ADMIN — Medication 10 MG: at 21:54

## 2022-05-27 RX ADMIN — PANTOPRAZOLE SODIUM 40 MG: 40 TABLET, DELAYED RELEASE ORAL at 09:27

## 2022-05-27 RX ADMIN — METOPROLOL TARTRATE 50 MG: 50 TABLET, FILM COATED ORAL at 09:27

## 2022-05-27 RX ADMIN — HYDRALAZINE HYDROCHLORIDE 25 MG: 25 TABLET ORAL at 21:54

## 2022-05-27 RX ADMIN — ENOXAPARIN SODIUM 40 MG: 40 INJECTION SUBCUTANEOUS at 09:28

## 2022-05-27 RX ADMIN — CLONAZEPAM 1 MG: 1 TABLET ORAL at 21:54

## 2022-05-27 RX ADMIN — TAMSULOSIN HYDROCHLORIDE 0.4 MG: 0.4 CAPSULE ORAL at 09:27

## 2022-05-27 RX ADMIN — PANTOPRAZOLE SODIUM 40 MG: 40 TABLET, DELAYED RELEASE ORAL at 17:23

## 2022-05-27 RX ADMIN — PERPHENAZINE 8 MG: 2 TABLET, FILM COATED ORAL at 14:56

## 2022-05-28 VITALS
TEMPERATURE: 97.5 F | RESPIRATION RATE: 16 BRPM | OXYGEN SATURATION: 95 % | SYSTOLIC BLOOD PRESSURE: 126 MMHG | HEART RATE: 90 BPM | WEIGHT: 179.2 LBS | DIASTOLIC BLOOD PRESSURE: 81 MMHG | BODY MASS INDEX: 28.12 KG/M2 | HEIGHT: 67 IN

## 2022-05-28 PROBLEM — K59.00 ACUTE CONSTIPATION: Status: RESOLVED | Noted: 2022-05-27 | Resolved: 2022-05-28

## 2022-05-28 LAB
BACTERIA SPEC AEROBE CULT: NORMAL
BACTERIA SPEC AEROBE CULT: NORMAL

## 2022-05-28 PROCEDURE — 25010000002 ENOXAPARIN PER 10 MG: Performed by: STUDENT IN AN ORGANIZED HEALTH CARE EDUCATION/TRAINING PROGRAM

## 2022-05-28 PROCEDURE — 99239 HOSP IP/OBS DSCHRG MGMT >30: CPT | Performed by: NURSE PRACTITIONER

## 2022-05-28 RX ORDER — POLYETHYLENE GLYCOL 3350 17 G/17G
17 POWDER, FOR SOLUTION ORAL 2 TIMES DAILY
Start: 2022-05-28

## 2022-05-28 RX ORDER — LACTULOSE 10 G/15ML
10 SOLUTION ORAL 3 TIMES DAILY
Start: 2022-05-28

## 2022-05-28 RX ORDER — AMLODIPINE BESYLATE 10 MG/1
10 TABLET ORAL
Start: 2022-05-29

## 2022-05-28 RX ORDER — LITHIUM CARBONATE 300 MG/1
300 CAPSULE ORAL 2 TIMES DAILY
Start: 2022-05-28

## 2022-05-28 RX ORDER — METOPROLOL TARTRATE 50 MG/1
50 TABLET, FILM COATED ORAL EVERY 12 HOURS SCHEDULED
Start: 2022-05-28

## 2022-05-28 RX ORDER — FOLIC ACID 1 MG/1
1 TABLET ORAL DAILY
Start: 2022-05-29

## 2022-05-28 RX ORDER — POTASSIUM CHLORIDE 1.5 G/1.77G
40 POWDER, FOR SOLUTION ORAL AS NEEDED
Start: 2022-05-28

## 2022-05-28 RX ORDER — DULOXETIN HYDROCHLORIDE 60 MG/1
60 CAPSULE, DELAYED RELEASE ORAL DAILY
Start: 2022-05-29

## 2022-05-28 RX ORDER — CHOLECALCIFEROL (VITAMIN D3) 125 MCG
10 CAPSULE ORAL NIGHTLY
Start: 2022-05-28

## 2022-05-28 RX ORDER — HYDRALAZINE HYDROCHLORIDE 25 MG/1
25 TABLET, FILM COATED ORAL EVERY 8 HOURS SCHEDULED
Start: 2022-05-28

## 2022-05-28 RX ORDER — TAMSULOSIN HYDROCHLORIDE 0.4 MG/1
0.4 CAPSULE ORAL DAILY
Qty: 30 CAPSULE
Start: 2022-05-29

## 2022-05-28 RX ADMIN — CLONAZEPAM 1 MG: 1 TABLET ORAL at 05:22

## 2022-05-28 RX ADMIN — VALACYCLOVIR HYDROCHLORIDE 1000 MG: 500 TABLET, FILM COATED ORAL at 05:22

## 2022-05-28 RX ADMIN — HYDRALAZINE HYDROCHLORIDE 25 MG: 25 TABLET ORAL at 05:22

## 2022-05-28 RX ADMIN — LITHIUM CARBONATE 300 MG: 300 CAPSULE ORAL at 08:08

## 2022-05-28 RX ADMIN — METOPROLOL TARTRATE 50 MG: 50 TABLET, FILM COATED ORAL at 08:08

## 2022-05-28 RX ADMIN — AMLODIPINE BESYLATE 10 MG: 10 TABLET ORAL at 08:08

## 2022-05-28 RX ADMIN — DULOXETINE HYDROCHLORIDE 60 MG: 60 CAPSULE, DELAYED RELEASE ORAL at 08:08

## 2022-05-28 RX ADMIN — DOCUSATE SODIUM 100 MG: 100 CAPSULE, LIQUID FILLED ORAL at 08:08

## 2022-05-28 RX ADMIN — ACETAMINOPHEN 650 MG: 325 TABLET ORAL at 02:43

## 2022-05-28 RX ADMIN — PANTOPRAZOLE SODIUM 40 MG: 40 TABLET, DELAYED RELEASE ORAL at 07:57

## 2022-05-28 RX ADMIN — FOLIC ACID 1 MG: 1 TABLET ORAL at 08:08

## 2022-05-28 RX ADMIN — THIAMINE HCL TAB 100 MG 100 MG: 100 TAB at 08:08

## 2022-05-28 RX ADMIN — ENOXAPARIN SODIUM 40 MG: 40 INJECTION SUBCUTANEOUS at 08:07

## 2022-05-28 RX ADMIN — TAMSULOSIN HYDROCHLORIDE 0.4 MG: 0.4 CAPSULE ORAL at 08:08

## 2022-05-28 RX ADMIN — LEVOTHYROXINE SODIUM 88 MCG: 88 TABLET ORAL at 05:22

## 2022-05-28 RX ADMIN — PERPHENAZINE 8 MG: 2 TABLET, FILM COATED ORAL at 05:22

## 2022-05-28 RX ADMIN — LACTULOSE 10 G: 20 SOLUTION ORAL at 08:07

## 2022-05-28 RX ADMIN — SENNOSIDES AND DOCUSATE SODIUM 2 TABLET: 50; 8.6 TABLET ORAL at 08:08

## 2022-06-28 LAB
MYCOBACTERIUM SPEC CULT: NORMAL
NIGHT BLUE STAIN TISS: NORMAL

## (undated) DEVICE — CONTN GRAD MEAS TRIANG 32OZ BLK

## (undated) DEVICE — INTRO ACCSR BLNT TP

## (undated) DEVICE — SYR LUERLOK 50ML

## (undated) DEVICE — SPNG VERSALON 4X4 4PLY NONSTRL LF BG/200

## (undated) DEVICE — KT ORCA ORCAPOD DISP STRL

## (undated) DEVICE — THE BITE BLOCK MAXI, LATEX FREE STRAP IS USED TO PROTECT THE ENDOSCOPE INSERTION TUBE FROM BEING BITTEN BY THE PATIENT.

## (undated) DEVICE — SINGLE-USE BIOPSY FORCEPS: Brand: RADIAL JAW 4

## (undated) DEVICE — SOL IRR H2O BTL 1000ML STRL

## (undated) DEVICE — LUBE GEL ENDOGLIDE 1.1OZ

## (undated) DEVICE — TUBING, SUCTION, 1/4" X 10', STRAIGHT: Brand: MEDLINE

## (undated) DEVICE — SUCTION CANISTER, 1000CC,SAFELINER: Brand: DEROYAL

## (undated) DEVICE — HYBRID CO2 TUBING/CAP SET FOR OLYMPUS® SCOPES & CO2 SOURCE: Brand: ERBE